# Patient Record
Sex: FEMALE | Race: ASIAN | NOT HISPANIC OR LATINO | Employment: FULL TIME | URBAN - METROPOLITAN AREA
[De-identification: names, ages, dates, MRNs, and addresses within clinical notes are randomized per-mention and may not be internally consistent; named-entity substitution may affect disease eponyms.]

---

## 2017-10-18 ENCOUNTER — OFFICE VISIT (OUTPATIENT)
Dept: INTERNAL MEDICINE | Facility: CLINIC | Age: 27
End: 2017-10-18
Payer: COMMERCIAL

## 2017-10-18 VITALS
TEMPERATURE: 98.2 F | HEART RATE: 76 BPM | DIASTOLIC BLOOD PRESSURE: 80 MMHG | WEIGHT: 132.3 LBS | OXYGEN SATURATION: 100 % | SYSTOLIC BLOOD PRESSURE: 110 MMHG | HEIGHT: 61 IN | BODY MASS INDEX: 24.98 KG/M2

## 2017-10-18 DIAGNOSIS — Z00.00 ROUTINE HISTORY AND PHYSICAL EXAMINATION OF ADULT: Primary | ICD-10-CM

## 2017-10-18 DIAGNOSIS — D64.9 ANEMIA, UNSPECIFIED TYPE: Primary | ICD-10-CM

## 2017-10-18 DIAGNOSIS — Z23 NEED FOR PROPHYLACTIC VACCINATION AND INOCULATION AGAINST INFLUENZA: ICD-10-CM

## 2017-10-18 DIAGNOSIS — D64.9 ANEMIA, UNSPECIFIED TYPE: ICD-10-CM

## 2017-10-18 DIAGNOSIS — N92.1 PROLONGED MENSTRUATION: ICD-10-CM

## 2017-10-18 DIAGNOSIS — Z13.220 SCREENING FOR CHOLESTEROL LEVEL: ICD-10-CM

## 2017-10-18 DIAGNOSIS — N92.1 PROLONGED MENSTRUAL CYCLE: ICD-10-CM

## 2017-10-18 LAB
ALBUMIN SERPL-MCNC: 3.7 G/DL (ref 3.4–5)
ALP SERPL-CCNC: 53 U/L (ref 40–150)
ALT SERPL W P-5'-P-CCNC: 18 U/L (ref 0–50)
ANION GAP SERPL CALCULATED.3IONS-SCNC: 6 MMOL/L (ref 3–14)
AST SERPL W P-5'-P-CCNC: 18 U/L (ref 0–45)
BILIRUB SERPL-MCNC: 0.8 MG/DL (ref 0.2–1.3)
BUN SERPL-MCNC: 11 MG/DL (ref 7–30)
CALCIUM SERPL-MCNC: 8.8 MG/DL (ref 8.5–10.1)
CHLORIDE SERPL-SCNC: 106 MMOL/L (ref 94–109)
CO2 SERPL-SCNC: 25 MMOL/L (ref 20–32)
CREAT SERPL-MCNC: 0.73 MG/DL (ref 0.52–1.04)
ERYTHROCYTE [DISTWIDTH] IN BLOOD BY AUTOMATED COUNT: 15.2 % (ref 10–15)
FSH SERPL-ACNC: 3.6 IU/L
GFR SERPL CREATININE-BSD FRML MDRD: >90 ML/MIN/1.7M2
GLUCOSE SERPL-MCNC: 87 MG/DL (ref 70–99)
HCG SERPL QL: NEGATIVE
HCT VFR BLD AUTO: 34.4 % (ref 35–47)
HGB BLD-MCNC: 11.4 G/DL (ref 11.7–15.7)
INR PPP: 0.95 (ref 0.86–1.14)
LH SERPL-ACNC: 11.1 IU/L
MCH RBC QN AUTO: 24.8 PG (ref 26.5–33)
MCHC RBC AUTO-ENTMCNC: 33.1 G/DL (ref 31.5–36.5)
MCV RBC AUTO: 75 FL (ref 78–100)
PLATELET # BLD AUTO: 237 10E9/L (ref 150–450)
POTASSIUM SERPL-SCNC: 4.5 MMOL/L (ref 3.4–5.3)
PROT SERPL-MCNC: 8 G/DL (ref 6.8–8.8)
RBC # BLD AUTO: 4.6 10E12/L (ref 3.8–5.2)
SODIUM SERPL-SCNC: 137 MMOL/L (ref 133–144)
TSH SERPL DL<=0.005 MIU/L-ACNC: 3.73 MU/L (ref 0.4–4)
WBC # BLD AUTO: 6.1 10E9/L (ref 4–11)

## 2017-10-18 PROCEDURE — 99385 PREV VISIT NEW AGE 18-39: CPT | Mod: 25 | Performed by: INTERNAL MEDICINE

## 2017-10-18 PROCEDURE — 90686 IIV4 VACC NO PRSV 0.5 ML IM: CPT | Performed by: INTERNAL MEDICINE

## 2017-10-18 PROCEDURE — 80061 LIPID PANEL: CPT | Performed by: INTERNAL MEDICINE

## 2017-10-18 PROCEDURE — 84443 ASSAY THYROID STIM HORMONE: CPT | Performed by: INTERNAL MEDICINE

## 2017-10-18 PROCEDURE — 99212 OFFICE O/P EST SF 10 MIN: CPT | Mod: 25 | Performed by: INTERNAL MEDICINE

## 2017-10-18 PROCEDURE — 80053 COMPREHEN METABOLIC PANEL: CPT | Performed by: INTERNAL MEDICINE

## 2017-10-18 PROCEDURE — 36415 COLL VENOUS BLD VENIPUNCTURE: CPT | Performed by: INTERNAL MEDICINE

## 2017-10-18 PROCEDURE — 90471 IMMUNIZATION ADMIN: CPT | Performed by: INTERNAL MEDICINE

## 2017-10-18 PROCEDURE — 83002 ASSAY OF GONADOTROPIN (LH): CPT | Performed by: INTERNAL MEDICINE

## 2017-10-18 PROCEDURE — 85610 PROTHROMBIN TIME: CPT | Performed by: INTERNAL MEDICINE

## 2017-10-18 PROCEDURE — 83540 ASSAY OF IRON: CPT | Performed by: INTERNAL MEDICINE

## 2017-10-18 PROCEDURE — 82728 ASSAY OF FERRITIN: CPT | Performed by: INTERNAL MEDICINE

## 2017-10-18 PROCEDURE — 84703 CHORIONIC GONADOTROPIN ASSAY: CPT | Performed by: INTERNAL MEDICINE

## 2017-10-18 PROCEDURE — 83001 ASSAY OF GONADOTROPIN (FSH): CPT | Performed by: INTERNAL MEDICINE

## 2017-10-18 PROCEDURE — 83550 IRON BINDING TEST: CPT | Performed by: INTERNAL MEDICINE

## 2017-10-18 PROCEDURE — 85027 COMPLETE CBC AUTOMATED: CPT | Performed by: INTERNAL MEDICINE

## 2017-10-18 NOTE — PROGRESS NOTES
Injectable Influenza Immunization Documentation    1.  Is the person to be vaccinated sick today?   No    2. Does the person to be vaccinated have an allergy to a component   of the vaccine?   No    3. Has the person to be vaccinated ever had a serious reaction   to influenza vaccine in the past?   No    4. Has the person to be vaccinated ever had Guillain-Barré syndrome?   No    Form completed by Mariana WILLETT

## 2017-10-18 NOTE — PROGRESS NOTES
SUBJECTIVE:                                                      HPI: Tere Alves is a pleasant 27 year old female who presents for a physical.    She complains of prolonged menstrual cycle (every 3 months) and prolonged menses (lasting 3-4 weeks):  - this has been ongoing for several years, at least  - periods are generally heavy throughout 3-4 weeks  - no prior work-ups  - was on OCPs last year for ~1 year - did not change above  - currently trying to get pregnant - they have been trying now for 3-4 months (were using condoms before)  - LMP ~3.5 weeks ago    No bleeding diatheses otherwise (nose bleeds, gum bleeding, rectal bleeding, hemarthroses, etc.)    ROS:  Constitutional: denies unintentional weight loss or gain; denies fevers, chills, or sweats     Cardiovascular: denies chest pain, palpitations, or edema  Respiratory: denies cough, wheezing, shortness of breath, or dyspnea on exertion  Gastrointestinal: denies nausea, vomiting, constipation, diarrhea, or abdominal pain  Genitourinary: denies urinary frequency, urgency, dysuria, or hematuria  Integumentary: denies rash or pruritus  Musculoskeletal: denies back pain, muscle pain, joint pain, or joint swelling  Neurologic: denies focal weakness, numbness, or tingling  Hematologic/Immunologic: denies history of anemia or blood transfusions  Endocrine: denies heat or cold intolerance; denies polyuria, polydipsia  Psychiatric: denies anxiety; see preventative health below    Past Medical History:   Diagnosis Date     NO ACTIVE PROBLEMS      Past Surgical History:   Procedure Laterality Date     NO HISTORY OF SURGERY       Family History   Problem Relation Age of Onset     DIABETES No family hx of      Myocardial Infarction No family hx of      CEREBROVASCULAR DISEASE No family hx of      Coronary Artery Disease Early Onset No family hx of      Breast Cancer No family hx of      Ovarian Cancer No family hx of      Colon Cancer No family hx of   "    Occupational History           Social History Main Topics     Smoking status: Never Smoker     Smokeless tobacco: Never Used     Alcohol use Yes      Comment: couple drinks/month     Drug use: No     Sexual activity: Yes     Partners: Male     Birth control/ protection: None     Social History Narrative    .    No kids currently, but trying to get pregnant (x3-4 months as of October, 2017).     No exercise.      No Known Allergies     MEDS: None    OBJECTIVE:                                                      /80 (BP Location: Left arm, Patient Position: Chair, Cuff Size: Adult Regular)  Pulse 76  Temp 98.2  F (36.8  C) (Oral)  Ht 5' 1\" (1.549 m)  Wt 132 lb 4.8 oz (60 kg)  LMP 09/18/2017 (Approximate)  SpO2 100%  Breastfeeding? No  BMI 25 kg/m2  Constitutional: well-appearing  Eyes: normal conjunctivae and lids; pupils equal, round, and reactive to light  Ears, Nose, Mouth, and Throat: normal ears and nose; tympanic membranes visualized and normal; normal lips, teeth, and gums; no oropharyngeal lesions or ulcers  Neck: supple and symmetric; no lymphadenopathy; no thyromegaly or masses  Respiratory: normal respiratory effort; clear to auscultation bilaterally  Cardiovascular: regular rate and rhythm; pedal pulses palpable; no edema  Gastrointestinal: soft, non-tender, non-distended, and bowel sounds present; no organomegaly or masses  Musculoskeletal: normal gait and station  Psych: normal judgment and insight; normal mood and affect; recent and remote memory intact; oriented to time, place, and person    PREVENTATIVE HEALTH                                                      BMI: within normal limits   Blood pressure: within normal limits   Breast CA screening: not medically indicated at this time   Cervical CA screening: last pap performed and normal in 2016, per patient ([place], [place])  Colon CA screening: not medically indicated at this time "   Lung CA screening: n/a   Dexa: not medically indicated at this time   Screening HCV: n/a   Screening cholesterol: not medically indicated at this time   Screening diabetes: not medically indicated at this time   STD testing: no risk factors present/discussed with and declined by patient  Depression screening: PHQ-2 assessment completed and reviewed - no intervention indicated at this time  Alcohol misuse screening: alcohol use reviewed - no intervention indicated at this time  Immunizations: reviewed; flu shot DUE; TDAP DUE, but patient will likely be pregnant in near future (will be getting during 3rd trimester)    ASSESSMENT/PLAN:                                                       (Z00.00) Routine history and physical examination of adult  (primary encounter diagnosis)  Comment: PMH, PSH, FH, SH, medications, allergies, immunizations, and preventative health measures reviewed.   Plan: see below for plans.     (Z23) Need for prophylactic vaccination and inoculation against influenza  Plan: flu shot given today.     (N92.1) Prolonged menstrual cycle  (N92.1) Prolonged menstruation  Comment: not influenced by OCPs.   Plan:    - CBC, CMP, INR, and TSH reflex today.   - LH, FSH, and serum HCG today.   - if labs unrevealing, will refer to ob/gyn for further evaluation.     The instructions on the AVS were discussed and explained to the patient. Patient expressed understanding of instructions.    (Chart documentation was completed, in part, with playnik voice-recognition software. Even though reviewed, some grammatical, spelling, and word errors may remain.)    Evy Murphy MD   [address] [address]  T: [phone], F: [phone]

## 2017-10-18 NOTE — MR AVS SNAPSHOT
After Visit Summary   10/18/2017    Tere Alves    MRN: [id number]           Patient Information     Date Of Birth          1990        Visit Information        Provider Department      10/18/2017 11:00 AM Evy Murphy MD [facility]        Today's Diagnoses     Need for prophylactic vaccination and inoculation against influenza    -  1    Prolonged menstrual cycle        Prolonged menstruation          Care Instructions    Flu shot today.    ---    Labs - please proceed to our first floor laboratory to have these drawn (show them your orange ticket).     Results:    If normal: we will release results in [employer] or send them in the mail. You will not be called for these results.    If abnormal, but non-urgent: we will release results in MyChart or send them in the mail. You will not be called for these results.    If abnormal and urgent: we will call you.    ---    If labs are unrevealing, recommend seeing ob/gyn for further evaluation.               Follow-ups after your visit        Who to contact     If you have questions or need follow up information about today's clinic visit or your schedule please contact [facility] directly at [phone].  Normal or non-critical lab and imaging results will be communicated to you by [employer], letter or phone within 4 business days after the clinic has received the results. If you do not hear from us within 7 days, please contact the clinic through [employer] or phone. If you have a critical or abnormal lab result, we will notify you by phone as soon as possible.  Submit refill requests through [employer] or call your pharmacy and they will forward the refill request to us. Please allow 3 business days for your refill to be completed.          Additional Information About Your Visit        [employer] Information     ASSIA lets you send messages to your doctor, view your test results, renew your  "prescriptions, schedule appointments and more. To sign up, go to [email or web address] . Click on \"Log in\" on the left side of the screen, which will take you to the Welcome page. Then click on \"Sign up Now\" on the right side of the page.     You will be asked to enter the access code listed below, as well as some personal information. Please follow the directions to create your username and password.     Your access code is: [email or web address]  Expires: 2018 11:32 AM     Your access code will  in 90 days. If you need help or a new code, please call your [facility] clinic or [phone].        Care EveryWhere ID     This is your Care EveryWhere ID. This could be used by other organizations to access your [facility] medical records  [id number]        Your Vitals Were     Pulse Temperature Height Last Period Pulse Oximetry Breastfeeding?    76 98.2  F (36.8  C) (Oral) 5' 1\" (1.549 m) 2017 (Approximate) 100% No    BMI (Body Mass Index)                   25 kg/m2            Blood Pressure from Last 3 Encounters:   10/18/17 110/80    Weight from Last 3 Encounters:   10/18/17 132 lb 4.8 oz (60 kg)              We Performed the Following     CBC with platelets     Comprehensive metabolic panel     FLU VAC, SPLIT VIRUS IM > 3 YO (QUADRIVALENT) [[id number]]     Follicle stimulating hormone     HCG qualitative Blood     INR     Lutropin     TSH with free T4 reflex     Vaccine Administration, Initial [[id number]]        Primary Care Provider    None Specified       No primary provider on file.        Equal Access to Services     [facility]: Hadii aad ku hadasho Sosanjeevali, waaxda luqadaha, qaybta kaalmada adeegyada, alea marr . So [facility] [phone].    ATENCIÓN: Si habla español, tiene a pickett disposición servicios gratuitos de asistencia lingüística. Llame al [phone].    We comply with applicable federal civil rights laws and [place] laws. We do not discriminate on the basis of race, " color, national origin, age, disability, sex, sexual orientation, or gender identity.            Thank you!     Thank you for choosing [facility]  for your care. Our goal is always to provide you with excellent care. Hearing back from our patients is one way we can continue to improve our services. Please take a few minutes to complete the written survey that you may receive in the mail after your visit with us. Thank you!             Your Updated Medication List - Protect others around you: Learn how to safely use, store and throw away your medicines at www.disposemymeds.org.      Notice  As of 10/18/2017 11:32 AM    You have not been prescribed any medications.

## 2017-10-18 NOTE — NURSING NOTE
"Chief Complaint   Patient presents with     Physical     Menstrual cycles     Questions on Menses . Having periods every -3 monrhs which lasts for a month.       Initial /80 (BP Location: Left arm, Patient Position: Chair, Cuff Size: Adult Regular)  Pulse 76  Temp 98.2  F (36.8  C) (Oral)  Ht 5' 1\" (1.549 m)  Wt 132 lb 4.8 oz (60 kg)  LMP 09/18/2017 (Approximate)  SpO2 100%  Breastfeeding? No  BMI 25 kg/m2 Estimated body mass index is 25 kg/(m^2) as calculated from the following:    Height as of this encounter: 5' 1\" (1.549 m).    Weight as of this encounter: 132 lb 4.8 oz (60 kg).  Medication Reconciliation: complete     Kaminibose MA      "

## 2017-10-18 NOTE — PATIENT INSTRUCTIONS
Flu shot today.    ---    Labs - please proceed to our first floor laboratory to have these drawn (show them your orange ticket).     Results:    If normal: we will release results in MyChart or send them in the mail. You will not be called for these results.    If abnormal, but non-urgent: we will release results in MyChart or send them in the mail. You will not be called for these results.    If abnormal and urgent: we will call you.    ---    If labs are unrevealing, recommend seeing ob/gyn for further evaluation.

## 2017-10-19 DIAGNOSIS — Z13.220 SCREENING FOR CHOLESTEROL LEVEL: Primary | ICD-10-CM

## 2017-10-19 LAB
CHOLEST SERPL-MCNC: 215 MG/DL
FERRITIN SERPL-MCNC: 9 NG/ML (ref 12–150)
HDLC SERPL-MCNC: 52 MG/DL
IRON SATN MFR SERPL: 19 % (ref 15–46)
IRON SERPL-MCNC: 65 UG/DL (ref 35–180)
LDLC SERPL CALC-MCNC: 136 MG/DL
NONHDLC SERPL-MCNC: 163 MG/DL
TIBC SERPL-MCNC: 348 UG/DL (ref 240–430)
TRIGL SERPL-MCNC: 133 MG/DL

## 2017-10-20 ENCOUNTER — TELEPHONE (OUTPATIENT)
Dept: INTERNAL MEDICINE | Facility: CLINIC | Age: 27
End: 2017-10-20

## 2017-10-20 NOTE — TELEPHONE ENCOUNTER
Called patient. No answer. Left VM for patient call back.    ---    Labs demonstrate iron deficiency anemia.    This is a very common finding in menstruating women and nothing to be concerned about.     Recommend iron supplementation as follows:  - slow release iron tablet daily (available over the counter)  - take iron with either a glass of orange juice or vitamin C supplement   - may also need a stool softener (colace/docusate) twice a day if constipation develops with iron use  - continue consistently for at least 3 months    ---    Also, cholesterol is a bit elevated.    No medication is needed, but lifestyle modifications are recommended: adherence to a heart healthy diet and regular exercise.    ---    Labs otherwise normal.

## 2018-01-28 ENCOUNTER — HEALTH MAINTENANCE LETTER (OUTPATIENT)
Age: 28
End: 2018-01-28

## 2018-05-16 ENCOUNTER — OFFICE VISIT (OUTPATIENT)
Dept: MIDWIFE SERVICES | Facility: CLINIC | Age: 28
End: 2018-05-16
Payer: COMMERCIAL

## 2018-05-16 VITALS
WEIGHT: 135.8 LBS | SYSTOLIC BLOOD PRESSURE: 124 MMHG | BODY MASS INDEX: 24.99 KG/M2 | HEIGHT: 62 IN | DIASTOLIC BLOOD PRESSURE: 80 MMHG

## 2018-05-16 DIAGNOSIS — Z11.8 SCREENING FOR CHLAMYDIAL DISEASE: ICD-10-CM

## 2018-05-16 DIAGNOSIS — Z32.00 ENCOUNTER FOR PREGNANCY TEST, RESULT UNKNOWN: ICD-10-CM

## 2018-05-16 DIAGNOSIS — N93.9 ABNORMAL UTERINE BLEEDING: Primary | ICD-10-CM

## 2018-05-16 DIAGNOSIS — Z11.3 SCREEN FOR STD (SEXUALLY TRANSMITTED DISEASE): ICD-10-CM

## 2018-05-16 LAB
BETA HCG QUAL IFA URINE: NEGATIVE
ERYTHROCYTE [DISTWIDTH] IN BLOOD BY AUTOMATED COUNT: 14.5 % (ref 10–15)
HCT VFR BLD AUTO: 30.3 % (ref 35–47)
HGB BLD-MCNC: 10.3 G/DL (ref 11.7–15.7)
MCH RBC QN AUTO: 25.1 PG (ref 26.5–33)
MCHC RBC AUTO-ENTMCNC: 34 G/DL (ref 31.5–36.5)
MCV RBC AUTO: 74 FL (ref 78–100)
PLATELET # BLD AUTO: 242 10E9/L (ref 150–450)
RBC # BLD AUTO: 4.11 10E12/L (ref 3.8–5.2)
WBC # BLD AUTO: 6.9 10E9/L (ref 4–11)

## 2018-05-16 PROCEDURE — 82728 ASSAY OF FERRITIN: CPT | Performed by: ADVANCED PRACTICE MIDWIFE

## 2018-05-16 PROCEDURE — 84703 CHORIONIC GONADOTROPIN ASSAY: CPT | Performed by: ADVANCED PRACTICE MIDWIFE

## 2018-05-16 PROCEDURE — 36415 COLL VENOUS BLD VENIPUNCTURE: CPT | Performed by: ADVANCED PRACTICE MIDWIFE

## 2018-05-16 PROCEDURE — 99203 OFFICE O/P NEW LOW 30 MIN: CPT | Performed by: ADVANCED PRACTICE MIDWIFE

## 2018-05-16 PROCEDURE — 99000 SPECIMEN HANDLING OFFICE-LAB: CPT | Performed by: ADVANCED PRACTICE MIDWIFE

## 2018-05-16 PROCEDURE — 00000447 ZZHCL STATISTIC VON WILLEBRAND MULTIMERS: Performed by: ADVANCED PRACTICE MIDWIFE

## 2018-05-16 PROCEDURE — 83550 IRON BINDING TEST: CPT | Performed by: ADVANCED PRACTICE MIDWIFE

## 2018-05-16 PROCEDURE — 85246 CLOT FACTOR VIII VW ANTIGEN: CPT | Performed by: ADVANCED PRACTICE MIDWIFE

## 2018-05-16 PROCEDURE — 85045 AUTOMATED RETICULOCYTE COUNT: CPT | Performed by: ADVANCED PRACTICE MIDWIFE

## 2018-05-16 PROCEDURE — 84702 CHORIONIC GONADOTROPIN TEST: CPT | Performed by: ADVANCED PRACTICE MIDWIFE

## 2018-05-16 PROCEDURE — 00000328 ZZHCL STATISTIC PTT NC: Performed by: ADVANCED PRACTICE MIDWIFE

## 2018-05-16 PROCEDURE — 83540 ASSAY OF IRON: CPT | Performed by: ADVANCED PRACTICE MIDWIFE

## 2018-05-16 PROCEDURE — 84443 ASSAY THYROID STIM HORMONE: CPT | Performed by: ADVANCED PRACTICE MIDWIFE

## 2018-05-16 PROCEDURE — 86780 TREPONEMA PALLIDUM: CPT | Performed by: ADVANCED PRACTICE MIDWIFE

## 2018-05-16 PROCEDURE — 00000167 ZZHCL STATISTIC INR NC: Performed by: ADVANCED PRACTICE MIDWIFE

## 2018-05-16 PROCEDURE — 87591 N.GONORRHOEAE DNA AMP PROB: CPT | Performed by: ADVANCED PRACTICE MIDWIFE

## 2018-05-16 PROCEDURE — 00000401 ZZHCL STATISTIC THROMBIN TIME NC: Performed by: ADVANCED PRACTICE MIDWIFE

## 2018-05-16 PROCEDURE — 85027 COMPLETE CBC AUTOMATED: CPT | Performed by: ADVANCED PRACTICE MIDWIFE

## 2018-05-16 PROCEDURE — 86803 HEPATITIS C AB TEST: CPT | Performed by: ADVANCED PRACTICE MIDWIFE

## 2018-05-16 PROCEDURE — 85245 CLOT FACTOR VIII VW RISTOCTN: CPT | Performed by: ADVANCED PRACTICE MIDWIFE

## 2018-05-16 PROCEDURE — 87340 HEPATITIS B SURFACE AG IA: CPT | Performed by: ADVANCED PRACTICE MIDWIFE

## 2018-05-16 PROCEDURE — 85240 CLOT FACTOR VIII AHG 1 STAGE: CPT | Performed by: ADVANCED PRACTICE MIDWIFE

## 2018-05-16 PROCEDURE — 87491 CHLMYD TRACH DNA AMP PROBE: CPT | Performed by: ADVANCED PRACTICE MIDWIFE

## 2018-05-16 PROCEDURE — 85245 CLOT FACTOR VIII VW RISTOCTN: CPT | Mod: 90 | Performed by: ADVANCED PRACTICE MIDWIFE

## 2018-05-16 PROCEDURE — 85247 CLOT FACTOR VIII MULTIMETRIC: CPT | Mod: 90 | Performed by: ADVANCED PRACTICE MIDWIFE

## 2018-05-16 PROCEDURE — 87389 HIV-1 AG W/HIV-1&-2 AB AG IA: CPT | Performed by: ADVANCED PRACTICE MIDWIFE

## 2018-05-16 RX ORDER — FERROUS SULFATE 325(65) MG
325 TABLET ORAL
Qty: 60 TABLET | Refills: 0 | Status: SHIPPED | OUTPATIENT
Start: 2018-05-16 | End: 2019-07-02

## 2018-05-16 NOTE — MR AVS SNAPSHOT
After Visit Summary   5/16/2018    Tere Alves    MRN: [id number]           Patient Information     Date Of Birth          1990        Visit Information        Provider Department      5/16/2018 3:50 PM Marianna Villanueva APRN CNM [facility]        Today's Diagnoses     Encounter for pregnancy test    -  1    Abnormal uterine bleeding        Screen for STD (sexually transmitted disease)        Screening for chlamydial disease           Follow-ups after your visit        Your next 10 appointments already scheduled     May 30, 2018  8:55 AM CDT   US TRANSVAGINAL NON OB with WEUS1   [facility] ([facility])    [address] [id number] [phone]           Please bring a list of your medicines (including vitamins, minerals and over-the-counter drugs). Also, tell your doctor about any allergies you may have. Wear comfortable clothes and leave your valuables at home.  You do not need to do anything special to prepare for your exam.  Please call the Imaging Department at your exam site with any questions.            May 30, 2018  9:30 AM CDT   Office Visit with HEATHER Issa CNM   [facility] ([facility]    [address] [id number] [phone]           Bring a current list of meds and any records pertaining to this visit. For Physicals, please bring immunization records and any forms needing to be filled out. Please arrive 10 minutes early to complete paperwork.              Future tests that were ordered for you today     Open Future Orders        Priority Expected Expires Ordered    US Transvaginal Non OB Routine  5/17/2019 5/16/2018            Who to contact     If you have questions or need follow up information about today's clinic visit or your schedule please contact [facility]  "directly at [phone].  Normal or non-critical lab and imaging results will be communicated to you by [employer], letter or phone within 4 business days after the clinic has received the results. If you do not hear from us within 7 days, please contact the clinic through [employer] or phone. If you have a critical or abnormal lab result, we will notify you by phone as soon as possible.  Submit refill requests through [employer] or call your pharmacy and they will forward the refill request to us. Please allow 3 business days for your refill to be completed.          Additional Information About Your Visit        [employer] Information     [employer] gives you secure access to your electronic health record. If you see a primary care provider, you can also send messages to your care team and make appointments. If you have questions, please call your primary care clinic.  If you do not have a primary care provider, please call [phone] and they will assist you.        Care EveryWhere ID     This is your Care EveryWhere ID. This could be used by other organizations to access your [facility] medical records  [id number]        Your Vitals Were     Height Last Period BMI (Body Mass Index)             5' 2\" (1.575 m) 03/25/2018 (Exact Date) 24.84 kg/m2          Blood Pressure from Last 3 Encounters:   05/16/18 124/80   10/18/17 110/80    Weight from Last 3 Encounters:   05/16/18 135 lb 12.8 oz (61.6 kg)   10/18/17 132 lb 4.8 oz (60 kg)              We Performed the Following     Beta HCG Qual, Urine - [facility] and [place] ([id number])     CBC with platelets     CHLAMYDIA TRACHOMATIS PCR     Ferritin     HCG Quantitative Pregnancy, Blood ([id number])     Hepatitis B Surface  Antigen     Hepatitis C Antibody     HIV Antigen Antibody Combo     Iron and iron binding capacity     NEISSERIA GONORRHOEA PCR     Reticulocyte count     Treponema Abs w Reflex to RPR and Titer     TSH with free T4 reflex     Von Willebrand antigen     von Willebrand " Factor Activity     Von Willebrand Multimers     VWF Activity with reflex to Ristocetin Cofactor Activity          Today's Medication Changes          These changes are accurate as of 5/16/18  5:19 PM.  If you have any questions, ask your nurse or doctor.               Start taking these medicines.        Dose/Directions    ferrous sulfate 325 (65 Fe) MG tablet   Commonly known as:  IRON   Used for:  Abnormal uterine bleeding   Started by:  Marianna Villanueva APRN CNM        Dose:  325 mg   Take 1 tablet (325 mg) by mouth daily (with breakfast)   Quantity:  60 tablet   Refills:  0       norgestrel-ethinyl estradiol 0.3-30 MG-MCG per tablet   Commonly known as:  LO/OVRAL   Used for:  Abnormal uterine bleeding   Started by:  Marianna Villanueva APRN CNM        Take three pills per day for two days, followed by two pills per day for three days, followed by one pill per day until pack is gone.   Quantity:  28 tablet   Refills:  0            Where to get your medicines      These medications were sent to [facility] [address] [place] [address]     Phone:  [phone]     ferrous sulfate 325 (65 Fe) MG tablet    norgestrel-ethinyl estradiol 0.3-30 MG-MCG per tablet                Primary Care Provider Office Phone # Fax #    Evy Murphy [phone]       [address] [facility] [address]        Equal Access to Services     [facility] : Hadtimur gormano Soanatoly, waaxda luqadaha, qaybta kaalmada deann, alea marr . So [facility] [phone].    ATENCIÓN: Si habla español, tiene a pcikett disposición servicios gratuitos de asistencia lingüística. Cy al [phone].    We comply with applicable federal civil rights laws and [place] laws. We do not discriminate on the basis of race, color, national origin, age, disability, sex, sexual orientation, or gender identity.            Thank you!      Thank you for choosing [facility]  for your care. Our goal is always to provide you with excellent care. Hearing back from our patients is one way we can continue to improve our services. Please take a few minutes to complete the written survey that you may receive in the mail after your visit with us. Thank you!             Your Updated Medication List - Protect others around you: Learn how to safely use, store and throw away your medicines at www.disposemymeds.org.          This list is accurate as of 5/16/18  5:19 PM.  Always use your most recent med list.                   Brand Name Dispense Instructions for use Diagnosis    ferrous sulfate 325 (65 Fe) MG tablet    IRON    60 tablet    Take 1 tablet (325 mg) by mouth daily (with breakfast)    Abnormal uterine bleeding       IBUPROFEN PO           norgestrel-ethinyl estradiol 0.3-30 MG-MCG per tablet    LO/OVRAL    28 tablet    Take three pills per day for two days, followed by two pills per day for three days, followed by one pill per day until pack is gone.    Abnormal uterine bleeding

## 2018-05-16 NOTE — PROGRESS NOTES
"Midwife Dysfunctional Uterine Bleeding Note    Date: 2018    CC:  Abnormal Uterine Bleeding    HPI:  Tere Alves is a 28 year old female  presents for evaluation of abnormal uterine bleeding as a referral from Evy Murphy.      Tere states that, for the past five years, she has had irregular, long and heavy periods.  Prior to that, periods were not irregular or heavy.  She states that nothing changed (weight gain/loss, etc) when her periods became abnormal five years ago. Periods occur approximately every 45 days and last 20-25 days.  Bleeding is heavy with clots; Tere states she typically has to change her saturated pad every 4-5 hours.      LMP was 3/25/18 and has not stopped. Two weeks ago, bleeding became heavier. Tere reports having to change her saturated maxi pad every 1-1.5 hours, even at night. She has also been passing clots.    Tere was seen in 2017 for abnormal uterine bleeding and workup at that time was normal. She has never had a pelvic ultrasound. Tere states that abnormal bleeding has been the same since she was last seen, aside from current heavy bleeding episode. She notes that she tried to conceive for approximately six months but has now stopped trying to conceive and uses condoms for contraception. Her  is back home in [facility] for the past two months, so Tere has not had intercourse since 2018.    Tere states that dizziness, lower back pain, cramps, headache, fatigue, vomiting and \"pinching pain\" bilaterally in her pelvis are associated symptoms.  Denies fever/chills.      - Last pap: 2016. Tere denies a history of abnormal pap or any cervical procedures.      GYN HISTORY:  Patient's last menstrual period was 2018 (exact date).   Menarche: 11  STI history: No STD history  History of cervical procedures: None   Contraceptive History: Tere states that she tried a birth control pill in 2016 for 3 months to help with abnormal periods. Tere " "states that her periods did not change and she began having cramps so she discontinued the OCP  The patient is sexually active with male partners. Uses condoms, last intercourse 3/2018.      Patient has following risk factors for endometrial cancer:  - Unopposed estrogen exposure: No  - Obesity: No  - Smoking: No  - Nulliparity: Yes.   - Infertility: No. Tried for 6 months and then stopped.  - Early age of menarche / late age of menopause: No  - Family history of colon cancer, ovarian cancer, and type I endometrial cancer: No    Denies the following contraindications to estrogen/progesterone combined contraception:  Migraine with aura  Smoking over age 35  Liver disease  Personal history of blood clot or stroke   History of heart disease  History of breast cancer  Undiagnosed vaginal bleeding  Hypertension  Pregnancy    OBSTETRIC HISTORY:   Obstetric History       T0      L0     SAB0   TAB0   Ectopic0   Multiple0   Live Births0           Past Medical History:   Diagnosis Date     NO ACTIVE PROBLEMS        Past Surgical History:   Procedure Laterality Date     NO HISTORY OF SURGERY           Family History   Problem Relation Age of Onset     DIABETES No family hx of      Myocardial Infarction No family hx of      CEREBROVASCULAR DISEASE No family hx of      Coronary Artery Disease Early Onset No family hx of      Breast Cancer No family hx of      Ovarian Cancer No family hx of      Colon Cancer No family hx of        Current Outpatient Prescriptions   Medication Sig Dispense Refill     IBUPROFEN PO          Allergies: Review of patient's allergies indicates no known allergies.      ROS:   12 point review of systems negative other than symptoms noted below.  Constitutional: Fatigue and Weight Gain  Gastrointestinal: Abdominal Pain  Genitourinary: Cramps, Heavy Bleeding with Period, Irregular Menses and Pelvic Pain  Neurologic: Dizziness    EXAM:  Blood pressure 124/80, height 5' 2\" (1.575 m), weight 135 " lb 12.8 oz (61.6 kg), last menstrual period 2018, not currently breastfeeding.   BMI= Body mass index is 24.84 kg/(m^2).  General - pleasant female in no acute distress.  Pelvic - external genitalia: normal adult female without lesions or abnormalities   Vagina: well rugated, no discharge, no lesions. Moderate/Large amount of bright red blood noted in vaginal vault.   Cervix: bleeding present from cervix. No visible lesions (difficult to visualize due to bleeding). Negative CMT.  Uterus: Normal size, mobile and nontender  Adnexae: no masses or tenderness.  Rectovaginal - deferred.    ASSESSMENT:  Tere Alves is a 28 year old  who presents with abnormal uterine bleeding.     Results for orders placed or performed in visit on 18   Beta HCG Qual, Urine - [facility] and [place] ([id number])   Result Value Ref Range    Beta HCG Qual IFA Urine Negative NEG^Negative          PLAN:  -UPT negative  -STD panel collected today per pt request  -CBC, TSH, Serum HCG, Von Willebrand panel, and iron studies drawn today  -Pelvic ultrasound ordered  -Lo Ovral sent to pharmacy. 3 pills per day x2 days, 2 pills per day x3 days, and one pill per day until pack is finished.  Gave written instructions.  -Rx for Iron sent to pharmacy  -Discussed heavy bleeding precautions and seeking care in the ED if bleeding/clots worsen or if Tere feels faint, SOB, etc.      F/U:  Return to clinic in the next week for pelvic US and follow-up with MD Marianna Villanueva, DNP, APRN, CNM    50 minutes were spent face to face with the patient today discussing her history, diagnosis, and follow-up plan as noted above. Over 50% of the visit was spent in counseling and coordination of care.    Total Visit Time: 50 minutes.

## 2018-05-17 ENCOUNTER — TELEPHONE (OUTPATIENT)
Dept: MIDWIFE SERVICES | Facility: CLINIC | Age: 28
End: 2018-05-17

## 2018-05-17 LAB
B-HCG SERPL-ACNC: <1 IU/L (ref 0–5)
FERRITIN SERPL-MCNC: 13 NG/ML (ref 12–150)
HBV SURFACE AG SERPL QL IA: NONREACTIVE
HCV AB SERPL QL IA: NONREACTIVE
HIV 1+2 AB+HIV1 P24 AG SERPL QL IA: NONREACTIVE
IRON SATN MFR SERPL: 8 % (ref 15–46)
IRON SERPL-MCNC: 24 UG/DL (ref 35–180)
RETICS # AUTO: 40.8 10E9/L (ref 25–95)
RETICS/RBC NFR AUTO: 1 % (ref 0.5–2)
TIBC SERPL-MCNC: 286 UG/DL (ref 240–430)
TSH SERPL DL<=0.005 MIU/L-ACNC: 2.2 MU/L (ref 0.4–4)

## 2018-05-18 LAB
C TRACH DNA SPEC QL NAA+PROBE: NEGATIVE
N GONORRHOEA DNA SPEC QL NAA+PROBE: NEGATIVE
SPECIMEN SOURCE: NORMAL
SPECIMEN SOURCE: NORMAL
T PALLIDUM AB SER QL: NONREACTIVE

## 2018-05-21 LAB
FACT VIII ACT/NOR PPP: 146 % (ref 55–200)
VWF CBA/VWF AG PPP IA-RTO: 208 % (ref 50–200)
VWF:AC ACT/NOR PPP IA: 201 % (ref 50–180)

## 2018-05-22 LAB — VWF MULTIMERS PPP QL: NORMAL

## 2018-05-24 ENCOUNTER — OFFICE VISIT (OUTPATIENT)
Dept: OBGYN | Facility: CLINIC | Age: 28
End: 2018-05-24
Attending: ADVANCED PRACTICE MIDWIFE
Payer: COMMERCIAL

## 2018-05-24 ENCOUNTER — RADIANT APPOINTMENT (OUTPATIENT)
Dept: ULTRASOUND IMAGING | Facility: CLINIC | Age: 28
End: 2018-05-24
Attending: ADVANCED PRACTICE MIDWIFE
Payer: COMMERCIAL

## 2018-05-24 ENCOUNTER — TELEPHONE (OUTPATIENT)
Dept: OBGYN | Facility: CLINIC | Age: 28
End: 2018-05-24

## 2018-05-24 VITALS
SYSTOLIC BLOOD PRESSURE: 102 MMHG | DIASTOLIC BLOOD PRESSURE: 62 MMHG | HEIGHT: 62 IN | BODY MASS INDEX: 24.44 KG/M2 | HEART RATE: 76 BPM | WEIGHT: 132.8 LBS

## 2018-05-24 DIAGNOSIS — N93.9 ABNORMAL UTERINE BLEEDING: ICD-10-CM

## 2018-05-24 LAB — VWF:RCO ACT/NOR PPP PL AGG: 162 % (ref 51–215)

## 2018-05-24 PROCEDURE — 99214 OFFICE O/P EST MOD 30 MIN: CPT | Performed by: OBSTETRICS & GYNECOLOGY

## 2018-05-24 PROCEDURE — 76830 TRANSVAGINAL US NON-OB: CPT | Performed by: OBSTETRICS & GYNECOLOGY

## 2018-05-24 NOTE — TELEPHONE ENCOUNTER
Order Questions      Question Answer Comment     Procedure name(s) - multi select D & C, hysteroscopy, myosure      Reason for procedure DUB and possible uterine polyp      Surgeon: Cremer      Is this a multi surgeon case? No      Laterality N/A      Request for additional equipment Other (see comments) None     Anesthesia General      Initiate Pre-op orders for above procedure: Yes, as ordered in Epic Additional orders noted there also     Location of Case: [facility] OR      Operating room  requested: No      Urgency of Surgery: Routine aug     Surgeon Procedure Time (incision to closure) in minutes (per procedure as applicable) 30      Note:  Surgical Case Time Needed (in minutes)     Patient Class (for admit prior to surgery, specify number of days in comments): Same day (hospital outpatient)      Why can t this outpatient surgery be done at the [facility] or  ASC? na      Vendor Needed? Yes

## 2018-05-24 NOTE — MR AVS SNAPSHOT
"              After Visit Summary   5/24/2018    Tere Alves    MRN: [id number]           Patient Information     Date Of Birth          1990        Visit Information        Provider Department      5/24/2018 10:20 AM Elham Salvador MD [facility]        Today's Diagnoses     Abnormal uterine bleeding           Follow-ups after your visit        Who to contact     If you have questions or need follow up information about today's clinic visit or your schedule please contact [facility] directly at [phone].  Normal or non-critical lab and imaging results will be communicated to you by MePleasehart, letter or phone within 4 business days after the clinic has received the results. If you do not hear from us within 7 days, please contact the clinic through Fetchnotest or phone. If you have a critical or abnormal lab result, we will notify you by phone as soon as possible.  Submit refill requests through [employer] or call your pharmacy and they will forward the refill request to us. Please allow 3 business days for your refill to be completed.          Additional Information About Your Visit        MyChart Information     [employer] gives you secure access to your electronic health record. If you see a primary care provider, you can also send messages to your care team and make appointments. If you have questions, please call your primary care clinic.  If you do not have a primary care provider, please call [phone] and they will assist you.        Care EveryWhere ID     This is your Care EveryWhere ID. This could be used by other organizations to access your [place] medical records  [id number]        Your Vitals Were     Pulse Height BMI (Body Mass Index)             76 5' 2\" (1.575 m) 24.29 kg/m2          Blood Pressure from Last 3 Encounters:   05/24/18 102/62   05/16/18 124/80   10/18/17 110/80    Weight from Last 3 Encounters:   05/24/18 132 lb 12.8 oz (60.2 kg) "   05/16/18 135 lb 12.8 oz (61.6 kg)   10/18/17 132 lb 4.8 oz (60 kg)              We Performed the Following     Negrita-Operative Worksheet GYN          Today's Medication Changes          These changes are accurate as of 5/24/18 10:27 AM.  If you have any questions, ask your nurse or doctor.               These medicines have changed or have updated prescriptions.        Dose/Directions    norgestrel-ethinyl estradiol 0.3-30 MG-MCG per tablet   Commonly known as:  LO/OVRAL   This may have changed:    - how much to take  - how to take this  - when to take this  - additional instructions   Used for:  Abnormal uterine bleeding   Changed by:  Elham Salvador MD        Dose:  1 tablet   Take 1 tablet by mouth daily   Quantity:  84 tablet   Refills:  3            Where to get your medicines      These medications were sent to [facility] [address] [place] [address]     Phone:  [phone]     norgestrel-ethinyl estradiol 0.3-30 MG-MCG per tablet                Primary Care Provider Office Phone # Fax #    Evy Murphy [phone]       [address] [facility] [address]        Equal Access to Services     ALEXA SCHULTZ AH: Hadii dilshad burgos hadasho Soomaali, waaxda luqadaha, qaybta kaalmada adeegyada, alea mendes. So [facility] [phone].    ATENCIÓN: Si habla español, tiene a pickett disposición servicios gratuitos de asistencia lingüística. Llame al [phone].    We comply with applicable federal civil rights laws and [place] laws. We do not discriminate on the basis of race, color, national origin, age, disability, sex, sexual orientation, or gender identity.            Thank you!     Thank you for choosing [facility]  for your care. Our goal is always to provide you with excellent care. Hearing back from our patients is one way we can continue to improve our services. Please take a  few minutes to complete the written survey that you may receive in the mail after your visit with us. Thank you!             Your Updated Medication List - Protect others around you: Learn how to safely use, store and throw away your medicines at www.disposemymeds.org.          This list is accurate as of 5/24/18 10:27 AM.  Always use your most recent med list.                   Brand Name Dispense Instructions for use Diagnosis    ferrous sulfate 325 (65 Fe) MG tablet    IRON    60 tablet    Take 1 tablet (325 mg) by mouth daily (with breakfast)    Abnormal uterine bleeding       norgestrel-ethinyl estradiol 0.3-30 MG-MCG per tablet    LO/OVRAL    84 tablet    Take 1 tablet by mouth daily    Abnormal uterine bleeding

## 2018-05-24 NOTE — PROGRESS NOTES
SUBJECTIVE:                                                   Tere Alves is a 28 year old female who presents to clinic today for the following health issue(s):  Patient presents with:  Ultrasound: follow up to Abnormal uterine bleeding      HPI:  Patient has had menorrhagia for 6 months  Most recently several wks of bleeding so was started on ocp and now bleeding quit  Had us today, showed 9mm echogenic area in cavity, poss fibroid or polyp   [facility]  Not desiring conception soon      No LMP recorded. Patient is not currently having periods (Reason: Irregular Periods)..   Patient is sexually active, .  Using oral contraceptives for contraception.    reports that she has never smoked. She has never used smokeless tobacco.    STD testing offered?  Declined    Health maintenance updated:  yes    Problem list and histories reviewed & adjusted, as indicated.  Additional history: as documented.    Patient Active Problem List   Diagnosis     Abnormal uterine bleeding     Past Surgical History:   Procedure Laterality Date     NO HISTORY OF SURGERY        Social History   Substance Use Topics     Smoking status: Never Smoker     Smokeless tobacco: Never Used     Alcohol use Yes      Comment: couple drinks/month         Negative family history of: DIABETES, Myocardial Infarction, CEREBROVASCULAR DISEASE, Coronary Artery Disease Early Onset, Breast Cancer, Ovarian Cancer, Colon Cancer            Current Outpatient Prescriptions   Medication Sig     ferrous sulfate (IRON) 325 (65 Fe) MG tablet Take 1 tablet (325 mg) by mouth daily (with breakfast)     norgestrel-ethinyl estradiol (LO/OVRAL) 0.3-30 MG-MCG per tablet Take 1 tablet by mouth daily     [DISCONTINUED] norgestrel-ethinyl estradiol (LO/OVRAL) 0.3-30 MG-MCG per tablet Take three pills per day for two days, followed by two pills per day for three days, followed by one pill per day until pack is gone.     No current  "facility-administered medications for this visit.      No Known Allergies    ROS:  12 point review of systems negative other than symptoms noted below.    OBJECTIVE:     /62  Pulse 76  Ht 5' 2\" (1.575 m)  Wt 132 lb 12.8 oz (60.2 kg)  BMI 24.29 kg/m2  Body mass index is 24.29 kg/(m^2).    Exam:  Constitutional:  Appearance: Well nourished, well developed alert, in no acute distress  Chest:  Respiratory Effort:  Breathing unlabored  Cardiovascular: Heart: Auscultation:  Regular rate, normal rhythm, no murmurs present  Neurologic/Psychiatric:  Mental Status:  Oriented X3      In-Clinic Test Results:  Results for orders placed or performed in visit on 05/24/18 (from the past 24 hour(s))   US Transvaginal Non OB    Narrative    US Transvaginal Non OB    Order #: [id number] Accession #: [id number]         Study Notes        Rosario Vincent on 5/24/2018  9:47 AM     Gynecological Ultrasound Report  Pelvic U/S - Transvaginal  [facility]  Referring Provider: Dr. Elham Salvador  Sonographer:  Rosario Vincent RDMS  Indication: AUB  LMP: 03/26/18  Gynecological Ultrasonography:   Uterus: anteverted  Size: 8.8 x 5.4 x 4.0cm  Endometrium: Thickness total 9.7mm  Findings: echogenic area = 0.9x 0.6x 0.5cm  Right Ovary: 4.0 x 2.6 x 2.5cm, follicle = 17.5mm  Left Ovary: 2.8 x 2.2 x 2.2cm  Cul de Sac/Pouch of Henri: no free fliud      Impression:  Normal pelvic ultrasound, possible small polyp or fibroid in   cavity  Elham Salvador MD                              ASSESSMENT/PLAN:                                                        ICD-10-CM    1. Abnormal uterine bleeding N93.9 norgestrel-ethinyl estradiol (LO/OVRAL) 0.3-30 MG-MCG per tablet     Negrita-Operative Worksheet GYN       There are no Patient Instructions on file for this visit.    Reviewed her us findings today  Most likely has small polyp or fibroid in cavity  rec D & C, hysteroscopy with myosure to remove it  Refilled her ocp to " continue thru surgery  Had anemia so we need to stop the AUB  Informed consent obtained  Discussed risks and benefits    Elham Salvador MD  [facility]

## 2018-05-25 LAB — VWF MULTIMERS PPP QL: NORMAL

## 2018-05-29 ENCOUNTER — TELEPHONE (OUTPATIENT)
Dept: MIDWIFE SERVICES | Facility: CLINIC | Age: 28
End: 2018-05-29

## 2018-05-29 DIAGNOSIS — R89.9 ABNORMAL LABORATORY TEST RESULT: Primary | ICD-10-CM

## 2018-05-29 NOTE — TELEPHONE ENCOUNTER
Call received from Dr. Kan at the [facility].  Tere's Von Willebrand labs came back slightly elevated; Dr. Kan recommends redrawing the Von Willebrand labs. Labs are not diagnostic at this point.  Discussed with Dr. Salvador who is now managing Tere's case and [facility] redrawing the Von Willbrand labs. Orders placed.    Please call the patient and have her schedule a follow-up lab-only visit.  Thank you-    Marianna Villanueva, DNP, APRN, CNM

## 2018-05-30 NOTE — TELEPHONE ENCOUNTER
Called pt again today to inform her of Dr. Salvador's recommendations to repeat the Von Willbrand labs (orders in place) by having her come in for a lab only visit for the blood draw. Reassured her that we will call her on the results and what the next step would be. Pt verbalized understanding and no further questions.  Lab only visit scheduled 5/31/18 AM  Esthela GARCIA RN

## 2018-05-31 DIAGNOSIS — R89.9 ABNORMAL LABORATORY TEST RESULT: ICD-10-CM

## 2018-05-31 PROCEDURE — 00000328 ZZHCL STATISTIC PTT NC: Performed by: ADVANCED PRACTICE MIDWIFE

## 2018-05-31 PROCEDURE — 00000401 ZZHCL STATISTIC THROMBIN TIME NC: Performed by: ADVANCED PRACTICE MIDWIFE

## 2018-05-31 PROCEDURE — 85245 CLOT FACTOR VIII VW RISTOCTN: CPT | Mod: 90 | Performed by: ADVANCED PRACTICE MIDWIFE

## 2018-05-31 PROCEDURE — 85247 CLOT FACTOR VIII MULTIMETRIC: CPT | Mod: 90 | Performed by: ADVANCED PRACTICE MIDWIFE

## 2018-05-31 PROCEDURE — 36415 COLL VENOUS BLD VENIPUNCTURE: CPT | Performed by: ADVANCED PRACTICE MIDWIFE

## 2018-05-31 PROCEDURE — 00000167 ZZHCL STATISTIC INR NC: Performed by: ADVANCED PRACTICE MIDWIFE

## 2018-05-31 PROCEDURE — 85245 CLOT FACTOR VIII VW RISTOCTN: CPT | Performed by: ADVANCED PRACTICE MIDWIFE

## 2018-05-31 PROCEDURE — 85240 CLOT FACTOR VIII AHG 1 STAGE: CPT | Performed by: ADVANCED PRACTICE MIDWIFE

## 2018-05-31 PROCEDURE — 99000 SPECIMEN HANDLING OFFICE-LAB: CPT | Performed by: ADVANCED PRACTICE MIDWIFE

## 2018-05-31 PROCEDURE — 85246 CLOT FACTOR VIII VW ANTIGEN: CPT | Performed by: ADVANCED PRACTICE MIDWIFE

## 2018-05-31 PROCEDURE — 00000447 ZZHCL STATISTIC VON WILLEBRAND MULTIMERS: Performed by: ADVANCED PRACTICE MIDWIFE

## 2018-06-04 LAB
FACT VIII ACT/NOR PPP: 197 % (ref 55–200)
VWF CBA/VWF AG PPP IA-RTO: 200 % (ref 50–200)
VWF:AC ACT/NOR PPP IA: 177 % (ref 50–180)

## 2018-06-05 LAB — VWF MULTIMERS PPP QL: NORMAL

## 2018-06-08 LAB — VWF:RCO ACT/NOR PPP PL AGG: 158 % (ref 51–215)

## 2018-06-12 LAB — VWF MULTIMERS PPP QL: NORMAL

## 2018-06-13 LAB — VON WILLEBRAND INTERPRETATION: NORMAL

## 2018-06-13 NOTE — PROGRESS NOTES
Please call the patient with the results.  Her repeat studies for Von Willebrands were normal, however this does not exclude diagnosis per Dr. Kan's note.  I would recommend following up with Dr. Salvador for planned D&C for heavy bleeding and may consider consult for hematology at that time if needed.    Thanks--    Marianna Villanueva, TEJ, APRN, CNM

## 2018-06-19 NOTE — TELEPHONE ENCOUNTER
Attempted to call pt re SX Sched. VM is full and can not leave msg    Cheryl Rodriguez  Surgery Scheduler

## 2018-07-09 NOTE — TELEPHONE ENCOUNTER
Spoke to patient. She is heading out of town tonight for a couple months and is unsure she wants to proceed. Advised I would close this out and when she returns if she would like to reschedule to give us a call.  Pt understood  Closing Encounter    Cheryl Rodriguez  Surgery Scheduler

## 2019-03-09 ENCOUNTER — HEALTH MAINTENANCE LETTER (OUTPATIENT)
Age: 29
End: 2019-03-09

## 2019-07-01 NOTE — PROGRESS NOTES
SUBJECTIVE:   Tere is a 29 year old here for vaginal symptoms:  vaginal discharge, itching and some urinary discomfort. She is interested in STI testing and starting OCPs. She is due for a pap screen.                   Vaginal Symptoms     Onset: off and on for 2 months    Description:  Vaginal Discharge: white, curd-like and thick  Itching (Pruritis): Yes  Burning sensation:  No  Odor:  No  Irritation:  Yes    Accompanying Signs & Symptoms:  Pain with Urination: Yes: intermittent  Abdominal Pain:  Yes: intermittent  Fever: No   History:   Sexually active:  Yes  New Partner:  No  Possibility of Pregnancy:  No  Contraceptive type: condoms    Precipitating factors:   Recent Antibiotic Use: No    Alleviating factors:  none   Therapies Tried and outcome: Monitstat 5 - did not work  Previous Episodes of Vaginitis:  Yes: long time ago      Other associated symptoms: pelvic pain, dyspareunia, dysuria and urinary frequency.  History of STI's:  No  STI Testing offered: YES      LMP: Patient's last menstrual period was 06/14/2019.      Patient Active Problem List   Diagnosis     Abnormal uterine bleeding     Past Medical History:   Diagnosis Date     NO ACTIVE PROBLEMS      Past Surgical History:   Procedure Laterality Date     NO HISTORY OF SURGERY       Current Outpatient Medications   Medication Sig Dispense Refill     fluconazole (DIFLUCAN) 150 MG tablet Take 1 tablet (150 mg) by mouth once for 1 dose 1 tablet 0     levonorgestrel (PLAN B) 1.5 MG tablet Take 1 tablet (1.5 mg) by mouth once for 1 dose 1 tablet 0     norethindrone-ethinyl estradiol (ORTHO-NOVUM 1-35 TAB,NORTREL 1-35 TAB) 1-35 MG-MCG tablet Take 1 tablet by mouth daily 84 tablet 0     No Known Allergies    Health maintenance updated:  no    ROS:   12 point review of systems negative other than symptoms noted below.  Genitourinary: Cramps, Painful Urination, Vaginal Discharge and Vaginal Itching    PHYSICAL EXAM:    /70   Pulse 68   Ht 1.575 m (5'  "2\")   Wt 59.9 kg (132 lb)   LMP 06/14/2019   BMI 24.14 kg/m  , Body mass index is 24.14 kg/m .  General appearance:  healthy, alert and no distress  Pelvic Exam:  Vulva: No lesions, no adenopathy, BUS WNL  Vagina: Moist, pink, discharge consistent with yeast  well rugated, no lesions  Cervix:smooth, pink, no visible lesions  Rectal exam: deferred    ASSESSMENT/PLAN:     ICD-10-CM    1. Dysuria R30.0 UA with Microscopic     Urine Culture Aerobic Bacterial   2. Itching of vagina N89.8 Group B strep PCR     Gram stain     Wet prep     Yeast culture     fluconazole (DIFLUCAN) 150 MG tablet   3. Screening for cervical cancer Z12.4 Pap imaged thin layer screen with HPV - recommended age 30 - 65     HPV High Risk Types DNA Cervical   4. Screen for STD (sexually transmitted disease) Z11.3 NEISSERIA GONORRHOEA PCR     HIV Antigen Antibody Combo     Treponema Abs w Reflex to RPR and Titer   5. Screening for chlamydial disease Z11.8 CHLAMYDIA TRACHOMATIS PCR   6. BCP (birth control pills) initiation Z30.011 norethindrone-ethinyl estradiol (ORTHO-NOVUM 1-35 TAB,NORTREL 1-35 TAB) 1-35 MG-MCG tablet   7. Emergency contraception Z30.012 levonorgestrel (PLAN B) 1.5 MG tablet       Results for orders placed or performed in visit on 07/02/19   UA with Microscopic   Result Value Ref Range    Color Urine Yellow     Appearance Urine Clear     Glucose Urine Negative NEG^Negative mg/dL    Bilirubin Urine Negative NEG^Negative    Ketones Urine Negative NEG^Negative mg/dL    Specific Gravity Urine 1.020 1.003 - 1.035    pH Urine 5.5 5.0 - 7.0 pH    Protein Albumin Urine Negative NEG^Negative mg/dL    Urobilinogen Urine 0.2 0.2 - 1.0 EU/dL    Nitrite Urine Negative NEG^Negative    Blood Urine Negative NEG^Negative    Leukocyte Esterase Urine Negative NEG^Negative    Source Midstream Urine     WBC Urine 0 - 5 OTO5^0 - 5 /HPF    RBC Urine O - 2 OTO2^O - 2 /HPF    Squamous Epithelial /LPF Urine Moderate (A) FEW^Few /LPF    Bacteria Urine " Moderate (A) NEG^Negative /HPF   Wet prep   Result Value Ref Range    Specimen Description Vagina     Wet Prep No Trichomonas seen     Wet Prep No clue cells seen     Wet Prep No yeast seen     Wet Prep No WBC's seen          COUNSELING:  Diflucan given for pruritis.    The use of the oral contraceptive pill has been fully discussed with the patient. This includes the proper method to initiate  and continue the pill, the need for regular compliance to ensure adequate contraceptive effect, the physiology which makes the pill effective, the instructions for what to do in event of a missed pill, and warnings about anticipated minor side effects such as breakthrough spotting, nausea, breast tenderness, weight changes, acne, headaches, etc. She was informed of the irregular bleeding pattern that can occur when the pill is first started or a new form is changed over for the first 2-3 months.  She has been told of the more serious potential side effects such as MI, stroke, and deep vein thrombosis, all of which are very unlikely.  She has been asked to report any signs of such serious problems immediately.   She understands and wishes to take the medication as prescribed. She will return in 3 months for a BP check then and Rx for OCPs will be given for the rest of the year.     We discussed using emergency contraception for missed OCPs. Instructions for use, benefits, risks, and side effects discussed. Rx sent.     Return to clinic if symptoms persist or worsen    Yoko Alvarado, TEJ, APRN, CNM

## 2019-07-02 ENCOUNTER — OFFICE VISIT (OUTPATIENT)
Dept: MIDWIFE SERVICES | Facility: CLINIC | Age: 29
End: 2019-07-02
Payer: COMMERCIAL

## 2019-07-02 VITALS
BODY MASS INDEX: 24.29 KG/M2 | WEIGHT: 132 LBS | HEIGHT: 62 IN | HEART RATE: 68 BPM | DIASTOLIC BLOOD PRESSURE: 70 MMHG | SYSTOLIC BLOOD PRESSURE: 112 MMHG

## 2019-07-02 DIAGNOSIS — Z11.8 SCREENING FOR CHLAMYDIAL DISEASE: ICD-10-CM

## 2019-07-02 DIAGNOSIS — Z12.4 SCREENING FOR CERVICAL CANCER: ICD-10-CM

## 2019-07-02 DIAGNOSIS — Z30.011 BCP (BIRTH CONTROL PILLS) INITIATION: ICD-10-CM

## 2019-07-02 DIAGNOSIS — Z30.012 EMERGENCY CONTRACEPTION: ICD-10-CM

## 2019-07-02 DIAGNOSIS — R30.0 DYSURIA: Primary | ICD-10-CM

## 2019-07-02 DIAGNOSIS — Z11.3 SCREEN FOR STD (SEXUALLY TRANSMITTED DISEASE): ICD-10-CM

## 2019-07-02 DIAGNOSIS — N89.8 ITCHING OF VAGINA: ICD-10-CM

## 2019-07-02 LAB
ALBUMIN UR-MCNC: NEGATIVE MG/DL
APPEARANCE UR: CLEAR
BACTERIA #/AREA URNS HPF: ABNORMAL /HPF
BILIRUB UR QL STRIP: NEGATIVE
COLOR UR AUTO: YELLOW
GLUCOSE UR STRIP-MCNC: NEGATIVE MG/DL
GRAM STN SPEC: ABNORMAL
HGB UR QL STRIP: NEGATIVE
KETONES UR STRIP-MCNC: NEGATIVE MG/DL
LEUKOCYTE ESTERASE UR QL STRIP: NEGATIVE
Lab: ABNORMAL
NITRATE UR QL: NEGATIVE
NON-SQ EPI CELLS #/AREA URNS LPF: ABNORMAL /LPF
PH UR STRIP: 5.5 PH (ref 5–7)
RBC #/AREA URNS AUTO: ABNORMAL /HPF
SOURCE: ABNORMAL
SP GR UR STRIP: 1.02 (ref 1–1.03)
SPECIMEN SOURCE: ABNORMAL
SPECIMEN SOURCE: NORMAL
UROBILINOGEN UR STRIP-ACNC: 0.2 EU/DL (ref 0.2–1)
WBC #/AREA URNS AUTO: ABNORMAL /HPF
WET PREP SPEC: NORMAL

## 2019-07-02 PROCEDURE — 87591 N.GONORRHOEAE DNA AMP PROB: CPT | Performed by: ADVANCED PRACTICE MIDWIFE

## 2019-07-02 PROCEDURE — 87491 CHLMYD TRACH DNA AMP PROBE: CPT | Performed by: ADVANCED PRACTICE MIDWIFE

## 2019-07-02 PROCEDURE — 87102 FUNGUS ISOLATION CULTURE: CPT | Performed by: ADVANCED PRACTICE MIDWIFE

## 2019-07-02 PROCEDURE — 87389 HIV-1 AG W/HIV-1&-2 AB AG IA: CPT | Performed by: ADVANCED PRACTICE MIDWIFE

## 2019-07-02 PROCEDURE — 87088 URINE BACTERIA CULTURE: CPT | Performed by: ADVANCED PRACTICE MIDWIFE

## 2019-07-02 PROCEDURE — G0145 SCR C/V CYTO,THINLAYER,RESCR: HCPCS | Performed by: ADVANCED PRACTICE MIDWIFE

## 2019-07-02 PROCEDURE — 81001 URINALYSIS AUTO W/SCOPE: CPT | Performed by: ADVANCED PRACTICE MIDWIFE

## 2019-07-02 PROCEDURE — 86780 TREPONEMA PALLIDUM: CPT | Performed by: ADVANCED PRACTICE MIDWIFE

## 2019-07-02 PROCEDURE — 87186 SC STD MICRODIL/AGAR DIL: CPT | Performed by: ADVANCED PRACTICE MIDWIFE

## 2019-07-02 PROCEDURE — 87653 STREP B DNA AMP PROBE: CPT | Performed by: ADVANCED PRACTICE MIDWIFE

## 2019-07-02 PROCEDURE — G0476 HPV COMBO ASSAY CA SCREEN: HCPCS | Performed by: ADVANCED PRACTICE MIDWIFE

## 2019-07-02 PROCEDURE — 87210 SMEAR WET MOUNT SALINE/INK: CPT | Performed by: ADVANCED PRACTICE MIDWIFE

## 2019-07-02 PROCEDURE — 36415 COLL VENOUS BLD VENIPUNCTURE: CPT | Performed by: ADVANCED PRACTICE MIDWIFE

## 2019-07-02 PROCEDURE — 87106 FUNGI IDENTIFICATION YEAST: CPT | Performed by: ADVANCED PRACTICE MIDWIFE

## 2019-07-02 PROCEDURE — 99213 OFFICE O/P EST LOW 20 MIN: CPT | Performed by: ADVANCED PRACTICE MIDWIFE

## 2019-07-02 PROCEDURE — 87624 HPV HI-RISK TYP POOLED RSLT: CPT | Performed by: ADVANCED PRACTICE MIDWIFE

## 2019-07-02 PROCEDURE — 87205 SMEAR GRAM STAIN: CPT | Performed by: ADVANCED PRACTICE MIDWIFE

## 2019-07-02 PROCEDURE — 87086 URINE CULTURE/COLONY COUNT: CPT | Performed by: ADVANCED PRACTICE MIDWIFE

## 2019-07-02 RX ORDER — FLUCONAZOLE 150 MG/1
150 TABLET ORAL ONCE
Qty: 1 TABLET | Refills: 0 | Status: SHIPPED | OUTPATIENT
Start: 2019-07-02 | End: 2019-07-02

## 2019-07-02 RX ORDER — LEVONORGESTREL 1.5 MG/1
1.5 TABLET ORAL ONCE
Qty: 1 TABLET | Refills: 0 | Status: SHIPPED | OUTPATIENT
Start: 2019-07-02 | End: 2021-07-26

## 2019-07-02 ASSESSMENT — MIFFLIN-ST. JEOR: SCORE: 1272

## 2019-07-02 NOTE — RESULT ENCOUNTER NOTE
Ruben Carranza,    No yeast was seen on the initial test. But due to symptoms, I still think it is a good idea to take the Diflucan (fluconazole). I will notify you with other results when they become available.    Thanks,  Yoko Alvarado, DNP, APRN, CNM

## 2019-07-03 DIAGNOSIS — B37.31 YEAST VAGINITIS: ICD-10-CM

## 2019-07-03 DIAGNOSIS — N76.0 BV (BACTERIAL VAGINOSIS): Primary | ICD-10-CM

## 2019-07-03 DIAGNOSIS — B96.89 BV (BACTERIAL VAGINOSIS): Primary | ICD-10-CM

## 2019-07-03 LAB
C TRACH DNA SPEC QL NAA+PROBE: NEGATIVE
GP B STREP DNA SPEC QL NAA+PROBE: POSITIVE
HIV 1+2 AB+HIV1 P24 AG SERPL QL IA: NONREACTIVE
N GONORRHOEA DNA SPEC QL NAA+PROBE: NEGATIVE
SPECIMEN SOURCE: ABNORMAL
SPECIMEN SOURCE: NORMAL
SPECIMEN SOURCE: NORMAL
T PALLIDUM AB SER QL: NONREACTIVE

## 2019-07-03 RX ORDER — METRONIDAZOLE 500 MG/1
500 TABLET ORAL 2 TIMES DAILY
Qty: 14 TABLET | Refills: 0 | Status: SHIPPED | OUTPATIENT
Start: 2019-07-03 | End: 2019-07-10

## 2019-07-03 RX ORDER — FLUCONAZOLE 150 MG/1
150 TABLET ORAL ONCE
Qty: 1 TABLET | Refills: 0 | Status: SHIPPED | OUTPATIENT
Start: 2019-07-03 | End: 2019-07-03

## 2019-07-03 NOTE — RESULT ENCOUNTER NOTE
Ruben Carranza,    Some of your lab work came back. It looks like you have bacterial vaginosis along with yeast. I sent a prescription for Flagyl and more Diflucan.     I would like you to take the original Diflucan that I prescribed now. Flagyl will treat the bacterial vaginosis. You should take one 500 mg pill every 12 hours for 7 days. This pill has some side effects. It will taste metallically when swallowed (like sucking on a antonio). It also turns your urine a dark color. If it upsets your stomach, then eat something before taking it. Finally, it is important that you do NOT drink alcohol while on this medication and for 3 days after (a total of 10 days). On the last day of this medication, take the other Diflucan pill that I prescribed. This will help prevent a yeast infection from developing again. While on these medications I would like you to either not have sex or at least use a condom. Bacterial vaginosis is not a sexually transmitted infection, but it is more likely not to go away if you have sex during treatment.    If you have questions or concerns, please contact the clinic.    Thanks,  Yoko Alvarado, DNP, APRN, CNM

## 2019-07-04 NOTE — RESULT ENCOUNTER NOTE
Ruben Carranza,    Your last result came back. It showed you have a bacteria called Group B Strep in your vagina. This bacteria is commonly found in your vagina and we normally do not treat it. But if you are still having negative symptoms after your other treatment, then let us know and we will talk with you about it.    Thanks,  Yoko Alvarado, DNP, APRN, CNM

## 2019-07-05 DIAGNOSIS — N30.00 ACUTE CYSTITIS WITHOUT HEMATURIA: Primary | ICD-10-CM

## 2019-07-05 LAB
BACTERIA SPEC CULT: ABNORMAL
BACTERIA SPEC CULT: ABNORMAL
COPATH REPORT: NORMAL
Lab: ABNORMAL
PAP: NORMAL
SPECIMEN SOURCE: ABNORMAL
SPECIMEN SOURCE: ABNORMAL
YEAST SPEC QL CULT: ABNORMAL

## 2019-07-05 RX ORDER — NITROFURANTOIN 25; 75 MG/1; MG/1
100 CAPSULE ORAL 2 TIMES DAILY
Qty: 10 CAPSULE | Refills: 0 | Status: SHIPPED | OUTPATIENT
Start: 2019-07-05 | End: 2019-07-10

## 2019-07-06 LAB
BACTERIA SPEC CULT: ABNORMAL
SPECIMEN SOURCE: ABNORMAL

## 2019-07-08 LAB
FINAL DIAGNOSIS: NORMAL
HPV HR 12 DNA CVX QL NAA+PROBE: NEGATIVE
HPV16 DNA SPEC QL NAA+PROBE: NEGATIVE
HPV18 DNA SPEC QL NAA+PROBE: NEGATIVE
SPECIMEN DESCRIPTION: NORMAL
SPECIMEN SOURCE CVX/VAG CYTO: NORMAL

## 2020-03-11 ENCOUNTER — HEALTH MAINTENANCE LETTER (OUTPATIENT)
Age: 30
End: 2020-03-11

## 2020-12-27 ENCOUNTER — HEALTH MAINTENANCE LETTER (OUTPATIENT)
Age: 30
End: 2020-12-27

## 2021-01-27 ENCOUNTER — OFFICE VISIT (OUTPATIENT)
Dept: OBGYN | Facility: CLINIC | Age: 31
End: 2021-01-27
Payer: COMMERCIAL

## 2021-01-27 VITALS
DIASTOLIC BLOOD PRESSURE: 64 MMHG | SYSTOLIC BLOOD PRESSURE: 118 MMHG | HEART RATE: 52 BPM | BODY MASS INDEX: 27.97 KG/M2 | HEIGHT: 62 IN | WEIGHT: 152 LBS

## 2021-01-27 DIAGNOSIS — N97.0 INFERTILITY ASSOCIATED WITH ANOVULATION: ICD-10-CM

## 2021-01-27 DIAGNOSIS — N91.2 AMENORRHEA: Primary | ICD-10-CM

## 2021-01-27 DIAGNOSIS — Z01.812 PRE-PROCEDURE LAB EXAM: ICD-10-CM

## 2021-01-27 LAB — HCG UR QL: NEGATIVE

## 2021-01-27 PROCEDURE — 81025 URINE PREGNANCY TEST: CPT | Performed by: OBSTETRICS & GYNECOLOGY

## 2021-01-27 PROCEDURE — 99213 OFFICE O/P EST LOW 20 MIN: CPT | Performed by: OBSTETRICS & GYNECOLOGY

## 2021-01-27 RX ORDER — MEDROXYPROGESTERONE ACETATE 10 MG
10 TABLET ORAL DAILY
Qty: 10 TABLET | Refills: 0 | Status: SHIPPED | OUTPATIENT
Start: 2021-01-27 | End: 2021-07-29

## 2021-01-27 ASSESSMENT — MIFFLIN-ST. JEOR: SCORE: 1352.72

## 2021-01-27 NOTE — PROGRESS NOTES
SUBJECTIVE:                                                   Tere Alves is a 31 year old female who presents to clinic today for the following health issue(s):  Patient presents with:  Consult: Irregular period and trying to get pregnant          HPI:  hcg negative  Has been trying to get pregnant 6 yrs  No periods last 2 months  No prior pregnancies  Long history of infrequent periods        No LMP recorded. (Menstrual status: Irregular Periods)..     Patient is sexually active, .  Using none for contraception.    reports that she has never smoked. She has never used smokeless tobacco.    STD testing offered?  Declined    Health maintenance updated:  yes    Today's PHQ-2 Score:   PHQ-2 (  [employer]) 2019   Q1: Little interest or pleasure in doing things 0   Q2: Feeling down, depressed or hopeless 0   PHQ-2 Score 0     Today's PHQ-9 Score: No flowsheet data found.  Today's CHERELLE-7 Score: No flowsheet data found.    Problem list and histories reviewed & adjusted, as indicated.  Additional history: as documented.    Patient Active Problem List   Diagnosis     Abnormal uterine bleeding     Past Surgical History:   Procedure Laterality Date     NO HISTORY OF SURGERY        Social History     Tobacco Use     Smoking status: Never Smoker     Smokeless tobacco: Never Used   Substance Use Topics     Alcohol use: Yes     Comment: couple drinks/month         Negative family history of: Diabetes, Myocardial Infarction, Cerebrovascular Disease, Coronary Artery Disease Early Onset, Breast Cancer, Ovarian Cancer, Colon Cancer            Current Outpatient Medications   Medication Sig     medroxyPROGESTERone (PROVERA) 10 MG tablet Take 1 tablet (10 mg) by mouth daily To start a period.     levonorgestrel (PLAN B) 1.5 MG tablet Take 1 tablet (1.5 mg) by mouth once for 1 dose     norethindrone-ethinyl estradiol (ORTHO-NOVUM 1-35 TAB,NORTREL 1-35 TAB) 1-35 MG-MCG tablet Take 1 tablet by mouth daily     No current  "facility-administered medications for this visit.      No Known Allergies    ROS:  12 point review of systems negative other than symptoms noted below or in the HPI.  Genitourinary: Irregular Menses  No urinary frequency or dysuria, bladder or kidney problems      OBJECTIVE:     /64   Pulse 52   Ht 1.575 m (5' 2\")   Wt 68.9 kg (152 lb)   BMI 27.80 kg/m    Body mass index is 27.8 kg/m .    Exam:  Constitutional:  Appearance: Well nourished, well developed alert, in no acute distress  Neck:  Lymph Nodes:  No lymphadenopathy present; Thyroid:  Gland size normal, nontender, no nodules or masses present on palpation  Gastrointestinal:  Abdominal Examination:  Abdomen nontender to palpation, tone normal without rigidity or guarding, no masses present, umbilicus without lesions; Liver/Spleen:  No hepatomegaly present, liver nontender to palpation; Hernias:  No hernias present  Skin: General Inspection:  No rashes present, no lesions present, no areas of discoloration.  Has some acne +    No striae   No hirsitism  Neurologic:  Mental Status:  Oriented X3.  Normal strength and tone, sensory exam grossly normal, mentation intact and speech normal.    Psychiatric:  Mentation appears normal and affect normal/bright.     In-Clinic Test Results:  Results for orders placed or performed in visit on 01/27/21 (from the past 24 hour(s))   HCG Qual, Urine ([id number])   Result Value Ref Range    HCG Qual Urine Negative NEG^Negative       ASSESSMENT/PLAN:                                                        ICD-10-CM    1. Pre-procedure lab exam  Z01.812 HCG Qual, Urine ([id number])   2. Amenorrhea  N91.2 TSH with free T4 reflex     Follicle stimulating hormone     Anti-Mullerian hormone     Estradiol     Prolactin     medroxyPROGESTERone (PROVERA) 10 MG tablet       Discussed amenorrhea  Will start provera 10 mg x 10 days  First day of bleeding is day 1 of cycle, call to make lab appt for about day 3  Lab orders futured out- PL, " AMH, TSH, E2, FSH  PCOS is likely cause of annovulation with her history    IF labs ok, will schedule pelvic us to check uterus and ovaries  Then trial clomid or femara for few months    Hsg, SA eventually  Given infertility printed instructions    Time for visit and counseling and charting 15 minutes today, new patient    Elham Salvador MD  [facility] [place]

## 2021-02-13 ENCOUNTER — MYC MEDICAL ADVICE (OUTPATIENT)
Dept: OBGYN | Facility: CLINIC | Age: 31
End: 2021-02-13

## 2021-02-13 DIAGNOSIS — N91.2 AMENORRHEA: Primary | ICD-10-CM

## 2021-02-15 NOTE — TELEPHONE ENCOUNTER
Discussed amenorrhea  Will start provera 10 mg x 10 days  First day of bleeding is day 1 of cycle, call to make lab appt for about day 3  Lab orders futured out- PL, AMH, TSH, E2, FSH  PCOS is likely cause of annovulation with her history    Amanda Goldberg RN on 2/15/2021 at 9:17 AM

## 2021-02-15 NOTE — TELEPHONE ENCOUNTER
Have her come in to clinic and do the labs we had planned tomorrow.  Also do a serum hcg. ( to prove not pregnant although this is unlikely)     I need to know if there is any other reason that she isn't getting a period like a thyroid problem for example.

## 2021-02-16 DIAGNOSIS — N91.2 AMENORRHEA: ICD-10-CM

## 2021-02-16 LAB
ESTRADIOL SERPL-MCNC: 54 PG/ML
FSH SERPL-ACNC: 6.5 IU/L
HCG SERPL QL: NEGATIVE
PROLACTIN SERPL-MCNC: 15 UG/L (ref 3–27)

## 2021-02-16 PROCEDURE — 36415 COLL VENOUS BLD VENIPUNCTURE: CPT | Performed by: OBSTETRICS & GYNECOLOGY

## 2021-02-16 PROCEDURE — 99000 SPECIMEN HANDLING OFFICE-LAB: CPT | Performed by: OBSTETRICS & GYNECOLOGY

## 2021-02-16 PROCEDURE — 84146 ASSAY OF PROLACTIN: CPT | Performed by: OBSTETRICS & GYNECOLOGY

## 2021-02-16 PROCEDURE — 83001 ASSAY OF GONADOTROPIN (FSH): CPT | Performed by: OBSTETRICS & GYNECOLOGY

## 2021-02-16 PROCEDURE — 83520 IMMUNOASSAY QUANT NOS NONAB: CPT | Mod: 90 | Performed by: OBSTETRICS & GYNECOLOGY

## 2021-02-16 PROCEDURE — 84443 ASSAY THYROID STIM HORMONE: CPT | Performed by: OBSTETRICS & GYNECOLOGY

## 2021-02-16 PROCEDURE — 84703 CHORIONIC GONADOTROPIN ASSAY: CPT | Performed by: OBSTETRICS & GYNECOLOGY

## 2021-02-16 PROCEDURE — 82670 ASSAY OF TOTAL ESTRADIOL: CPT | Performed by: OBSTETRICS & GYNECOLOGY

## 2021-02-17 LAB — TSH SERPL DL<=0.005 MIU/L-ACNC: 2.63 MU/L (ref 0.4–4)

## 2021-02-18 LAB — MIS SERPL-MCNC: 3.4 NG/ML (ref 0.18–11.71)

## 2021-04-25 ENCOUNTER — HEALTH MAINTENANCE LETTER (OUTPATIENT)
Age: 31
End: 2021-04-25

## 2021-07-23 ENCOUNTER — TELEPHONE (OUTPATIENT)
Dept: OBGYN | Facility: CLINIC | Age: 31
End: 2021-07-23

## 2021-07-27 NOTE — TELEPHONE ENCOUNTER
PT is having periods for past 2 weeks - heavy and blood clots with back pain and bad cramps  thus wanting to discuss w provider - earliest was Sundar 8/2    Discussed sx's: asked her multiple times and she states she is changing a pad hourly for the last 3 weeks.  Recommended ER for evaluation - no meds will be given without a proper evaluation as this is abnormal and need to find a reason why she is bleeding this much. Need to check hgb and US and if indeed bleeding where she is changing a regular pad hourly. She is at work doing this as well. Stated that she doesn't feel that weak.  Having back ache and cramps but tolerable.    Insisted on ER evaluation for reported sx's. Clinic not an emergent care and not appropriate.    She verbalized she will go to ER.    Esthela Winchester RN on 7/27/2021 at 11:10 AM

## 2021-07-28 DIAGNOSIS — N92.0 MENORRHAGIA: Primary | ICD-10-CM

## 2021-07-28 NOTE — PROGRESS NOTES
SUBJECTIVE:                                                   Tere Alves is a 31 year old female who presents to clinic today for the following health issue(s):  Patient presents with:  Vaginal Bleeding: Had a period mid may and now has had bleeding on/off since that time. Has had some minor cramping.      HPI: The patient is seen at this time for ongoing bleeding.  She has a past history of irregular anovulatory bleeding.  She was treated with Provera in January and responded with a good cleanout cycle and more regular periods.  She had a heavy cycle in May and has been spotting or bleeding heavily every day since.  Hemoglobin today is 9.4 g%.  The patient is desiring pregnancy but has had very irregular cycles.      Patient's last menstrual period was 2021 (approximate)..     Patient is sexually active, .  Using none for contraception.    reports that she has never smoked. She has never used smokeless tobacco.    STD testing offered?  Accepted    Health maintenance updated:  no    Today's PHQ-2 Score:   PHQ-2 (  [employer]) 2019   Q1: Little interest or pleasure in doing things 0   Q2: Feeling down, depressed or hopeless 0   PHQ-2 Score 0     Today's PHQ-9 Score: No flowsheet data found.  Today's CHERELLE-7 Score: No flowsheet data found.    Problem list and histories reviewed & adjusted, as indicated.  Additional history: as documented.    Patient Active Problem List   Diagnosis     Abnormal uterine bleeding     Past Surgical History:   Procedure Laterality Date     NO HISTORY OF SURGERY        Social History     Tobacco Use     Smoking status: Never Smoker     Smokeless tobacco: Never Used   Substance Use Topics     Alcohol use: Yes     Comment: couple drinks/month         Negative family history of: Diabetes, Myocardial Infarction, Cerebrovascular Disease, Coronary Artery Disease Early Onset, Breast Cancer, Ovarian Cancer, Colon Cancer            No current outpatient medications on file.  "    No current facility-administered medications for this visit.     No Known Allergies    ROS:  12 point review of systems negative other than symptoms noted below or in the HPI.  Genitourinary: Cramps and Irregular Menses  No urinary frequency or dysuria, bladder or kidney problems      OBJECTIVE:     /70   Pulse 76   Ht 1.575 m (5' 2\")   Wt 67.1 kg (148 lb)   LMP 05/17/2021 (Approximate)   BMI 27.07 kg/m    Body mass index is 27.07 kg/m .    Exam:  Constitutional:  Appearance: Well nourished, well developed alert, in no acute distress  Gastrointestinal:  Abdominal Examination:  Abdomen nontender to palpation, tone normal without rigidity or guarding, no masses present, umbilicus without lesions; Liver/Spleen:  No hepatomegaly present, liver nontender to palpation; Hernias:  No hernias present  Lymphatic: Lymph Nodes:  No other lymphadenopathy present  Skin: General Inspection:  No rashes present, no lesions present, no areas of discoloration.  Neurologic:  Mental Status:  Oriented X3.  Normal strength and tone, sensory exam grossly normal, mentation intact and speech normal.    Psychiatric:  Mentation appears normal and affect normal/bright.  Pelvic Exam:  External Genitalia:     Normal appearance for age, no discharge present, no tenderness present, no inflammatory lesions present, color normal  Vagina:     Normal vaginal vault without central or paravaginal defects, no discharge present, no inflammatory lesions present, no masses present  Bladder:     Nontender to palpation  Urethra:   Urethral Body:  Urethra palpation normal, urethra structural support normal   Urethral Meatus:  No erythema or lesions present  Cervix:     Appearance healthy, no lesions present, nontender to palpation, no bleeding present  Uterus:     Uterus: Soft, mildly enlarged and tender, midplane in position.   Adnexa:     No adnexal tenderness present, no adnexal masses present  Perineum:     Perineum within normal limits, no " evidence of trauma, no rashes or skin lesions present  Anus:     Anus within normal limits, no hemorrhoids present  Inguinal Lymph Nodes:     No lymphadenopathy present  Pubic Hair:     Normal pubic hair distribution for age  Genitalia and Groin:     No rashes present, no lesions present, no areas of discoloration, no masses present       In-Clinic Test Results:  Results for orders placed or performed in visit on 07/29/21 (from the past 24 hour(s))   Hemoglobin   Result Value Ref Range    Hemoglobin 9.4 (L) 11.7 - 15.7 g/dL       ASSESSMENT/PLAN:                                                        ICD-10-CM    1. Anemia due to acute blood loss  D62    2. Menorrhagia with irregular cycle  N92.1 US Transvaginal Non OB       31-year-old with chronic anovulation and menorrhagia.  She is now anemic.  Her lab work-up earlier this year showed that she was not menopausal with normal thyroid function and normal prolactin.  Her uterus is boggy and tender and mildly enlarged.  We will perform an ultrasound and determine if there is any intrauterine pathology to attribute the heavy bleeding to.        Rich Thorne MD  [facility] [place]

## 2021-07-29 ENCOUNTER — LAB (OUTPATIENT)
Dept: LAB | Facility: CLINIC | Age: 31
End: 2021-07-29
Payer: COMMERCIAL

## 2021-07-29 ENCOUNTER — OFFICE VISIT (OUTPATIENT)
Dept: OBGYN | Facility: CLINIC | Age: 31
End: 2021-07-29
Payer: COMMERCIAL

## 2021-07-29 VITALS
BODY MASS INDEX: 27.23 KG/M2 | HEIGHT: 62 IN | WEIGHT: 148 LBS | HEART RATE: 76 BPM | SYSTOLIC BLOOD PRESSURE: 112 MMHG | DIASTOLIC BLOOD PRESSURE: 70 MMHG

## 2021-07-29 DIAGNOSIS — D62 ANEMIA DUE TO ACUTE BLOOD LOSS: Primary | ICD-10-CM

## 2021-07-29 DIAGNOSIS — N92.0 MENORRHAGIA: ICD-10-CM

## 2021-07-29 DIAGNOSIS — N92.1 MENORRHAGIA WITH IRREGULAR CYCLE: ICD-10-CM

## 2021-07-29 LAB — HGB BLD-MCNC: 9.4 G/DL (ref 11.7–15.7)

## 2021-07-29 PROCEDURE — 99213 OFFICE O/P EST LOW 20 MIN: CPT | Performed by: OBSTETRICS & GYNECOLOGY

## 2021-07-29 PROCEDURE — 85018 HEMOGLOBIN: CPT

## 2021-07-29 PROCEDURE — 36415 COLL VENOUS BLD VENIPUNCTURE: CPT

## 2021-07-29 ASSESSMENT — MIFFLIN-ST. JEOR: SCORE: 1334.57

## 2021-08-10 NOTE — H&P (VIEW-ONLY)
SUBJECTIVE:                                                   Tere Alves is a 31 year old female who presents to clinic today for the following health issue(s):  Patient presents with:  Ultrasound      HPI: The patient is a 31-year-old who has had a very prolonged episode of bleeding that started in May and ended a week ago.  She has been found to be anemic at her last visit 2 weeks ago.  Ultrasound was performed today.  She is still desirous of fertility.      No LMP recorded. (Menstrual status: Irregular Periods)..     Patient is sexually active, .  Using none for contraception.    reports that she has never smoked. She has never used smokeless tobacco.    STD testing offered?  Declined    Health maintenance updated:  yes    Today's PHQ-2 Score:   PHQ-2 (  [employer]) 2021   Q1: Little interest or pleasure in doing things 0   Q2: Feeling down, depressed or hopeless 0   PHQ-2 Score 0     Today's PHQ-9 Score: No flowsheet data found.  Today's CHERELLE-7 Score: No flowsheet data found.    Problem list and histories reviewed & adjusted, as indicated.  Additional history: as documented.    Patient Active Problem List   Diagnosis     Abnormal uterine bleeding     Past Surgical History:   Procedure Laterality Date     NO HISTORY OF SURGERY        Social History     Tobacco Use     Smoking status: Never Smoker     Smokeless tobacco: Never Used   Substance Use Topics     Alcohol use: Yes     Comment: couple drinks/month         Negative family history of: Diabetes, Myocardial Infarction, Cerebrovascular Disease, Coronary Artery Disease Early Onset, Breast Cancer, Ovarian Cancer, Colon Cancer            No current outpatient medications on file.     No current facility-administered medications for this visit.     No Known Allergies    ROS:  12 point review of systems negative other than symptoms noted below or in the HPI.  Genitourinary: Irregular Menses  No urinary frequency or dysuria, bladder or kidney  "problems      OBJECTIVE:     /72   Pulse 76   Ht 1.575 m (5' 2\")   Wt 66.7 kg (147 lb)   BMI 26.89 kg/m    Body mass index is 26.89 kg/m .    Exam:  Constitutional:  Appearance: Well nourished, well developed alert, in no acute distress  Chest:  Respiratory Effort:  Breathing unlabored. Clear to auscultation bilaterally.   Cardiovascular: Heart: Auscultation:  Regular rate, normal rhythm, no murmurs present  Gastrointestinal:  Abdominal Examination:  Abdomen nontender to palpation, tone normal without rigidity or guarding, no masses present, umbilicus without lesions; Liver/Spleen:  No hepatomegaly present, liver nontender to palpation; Hernias:  No hernias present  Lymphatic: Lymph Nodes:  No other lymphadenopathy present  Skin: General Inspection:  No rashes present, no lesions present, no areas of discoloration.  Neurologic:  Mental Status:  Oriented X3.  Normal strength and tone, sensory exam grossly normal, mentation intact and speech normal.    Psychiatric:  Mentation appears normal and affect normal/bright.  No Pelvic Exam performed     In-Clinic Test Results:  Results for orders placed or performed in visit on 08/12/21   US Transvaginal Non OB     Status: None    Narrative    US Transvaginal Non OB  Order #: [id number] Accession #: [id number]  Study Notes     Laverne Leblanc on 8/12/2021  1:52 PM      Gynecological Ultrasound Report  Pelvic U/S - Transvaginal  [facility]  Referring Provider: Rich Thorne MD  Sonographer:  Laverne Leblanc RDMS  Indication: Bleeding/Menses- Menorrhagia (heavy menses)  LMP: 05/14/2021 (Approximate) - Irregular periods  History:   Gynecological Ultrasonography:   Uterus: anteverted. Contour is smooth/regular.  Size: 8.6 x 4.8 x 4.0 cm  Endometrium: Thickness Total 11.3 mm  Findings: Probable polyp vs fibroid 1.2 x 0.6 x 1.2cm, hypoechoic area 0.8   x 0.6 x 0.7cm  Right Ovary: 3.6 x 1.7 x 1.8 cm. Wnl, paraovarian cyst 0.8 x 0.6 x 0.8cm  Left Ovary: " 3.2 x 2.8 x 2.2 cm. Complex cyst 2.4 x 1.5 x 1.8 cm  Cul de Sac Free Fluid: No free fluid     Impression: Intrauterine mass, right paraovarian cyst, left complex   ovarian cyst       RACHANA DAY        Discussed in office         ASSESSMENT/PLAN:                                                        ICD-10-CM    1. Menorrhagia with irregular cycle  N92.1    2. Endometrial polyp  N84.0      Patient with intrauterine filling defect that will either turn out to be polyps or fibroids.  She also has a complex cyst of the left ovary that is most likely postovulatory.  She has been taking her iron.  We recommend a hysteroscopy with removal of what ever the masses are and cleanout without any ablation.  The risks and complications as well as an ACOG brochure on the procedure have been reviewed with the patient.        Rich Thorne MD  [facility] [place]

## 2021-08-10 NOTE — PROGRESS NOTES
SUBJECTIVE:                                                   Tere Alves is a 31 year old female who presents to clinic today for the following health issue(s):  Patient presents with:  Ultrasound      HPI: The patient is a 31-year-old who has had a very prolonged episode of bleeding that started in May and ended a week ago.  She has been found to be anemic at her last visit 2 weeks ago.  Ultrasound was performed today.  She is still desirous of fertility.      No LMP recorded. (Menstrual status: Irregular Periods)..     Patient is sexually active, .  Using none for contraception.    reports that she has never smoked. She has never used smokeless tobacco.    STD testing offered?  Declined    Health maintenance updated:  yes    Today's PHQ-2 Score:   PHQ-2 (  [employer]) 2021   Q1: Little interest or pleasure in doing things 0   Q2: Feeling down, depressed or hopeless 0   PHQ-2 Score 0     Today's PHQ-9 Score: No flowsheet data found.  Today's CHERELLE-7 Score: No flowsheet data found.    Problem list and histories reviewed & adjusted, as indicated.  Additional history: as documented.    Patient Active Problem List   Diagnosis     Abnormal uterine bleeding     Past Surgical History:   Procedure Laterality Date     NO HISTORY OF SURGERY        Social History     Tobacco Use     Smoking status: Never Smoker     Smokeless tobacco: Never Used   Substance Use Topics     Alcohol use: Yes     Comment: couple drinks/month         Negative family history of: Diabetes, Myocardial Infarction, Cerebrovascular Disease, Coronary Artery Disease Early Onset, Breast Cancer, Ovarian Cancer, Colon Cancer            No current outpatient medications on file.     No current facility-administered medications for this visit.     No Known Allergies    ROS:  12 point review of systems negative other than symptoms noted below or in the HPI.  Genitourinary: Irregular Menses  No urinary frequency or dysuria, bladder or kidney  "problems      OBJECTIVE:     /72   Pulse 76   Ht 1.575 m (5' 2\")   Wt 66.7 kg (147 lb)   BMI 26.89 kg/m    Body mass index is 26.89 kg/m .    Exam:  Constitutional:  Appearance: Well nourished, well developed alert, in no acute distress  Chest:  Respiratory Effort:  Breathing unlabored. Clear to auscultation bilaterally.   Cardiovascular: Heart: Auscultation:  Regular rate, normal rhythm, no murmurs present  Gastrointestinal:  Abdominal Examination:  Abdomen nontender to palpation, tone normal without rigidity or guarding, no masses present, umbilicus without lesions; Liver/Spleen:  No hepatomegaly present, liver nontender to palpation; Hernias:  No hernias present  Lymphatic: Lymph Nodes:  No other lymphadenopathy present  Skin: General Inspection:  No rashes present, no lesions present, no areas of discoloration.  Neurologic:  Mental Status:  Oriented X3.  Normal strength and tone, sensory exam grossly normal, mentation intact and speech normal.    Psychiatric:  Mentation appears normal and affect normal/bright.  No Pelvic Exam performed     In-Clinic Test Results:  Results for orders placed or performed in visit on 08/12/21   US Transvaginal Non OB     Status: None    Narrative    US Transvaginal Non OB  Order #: [id number] Accession #: [id number]  Study Notes     Laverne Leblanc on 8/12/2021  1:52 PM      Gynecological Ultrasound Report  Pelvic U/S - Transvaginal  [facility]  Referring Provider: Rich Thorne MD  Sonographer:  Laverne Leblanc RDMS  Indication: Bleeding/Menses- Menorrhagia (heavy menses)  LMP: 05/14/2021 (Approximate) - Irregular periods  History:   Gynecological Ultrasonography:   Uterus: anteverted. Contour is smooth/regular.  Size: 8.6 x 4.8 x 4.0 cm  Endometrium: Thickness Total 11.3 mm  Findings: Probable polyp vs fibroid 1.2 x 0.6 x 1.2cm, hypoechoic area 0.8   x 0.6 x 0.7cm  Right Ovary: 3.6 x 1.7 x 1.8 cm. Wnl, paraovarian cyst 0.8 x 0.6 x 0.8cm  Left Ovary: " 3.2 x 2.8 x 2.2 cm. Complex cyst 2.4 x 1.5 x 1.8 cm  Cul de Sac Free Fluid: No free fluid     Impression: Intrauterine mass, right paraovarian cyst, left complex   ovarian cyst       RACHANA DAY        Discussed in office         ASSESSMENT/PLAN:                                                        ICD-10-CM    1. Menorrhagia with irregular cycle  N92.1    2. Endometrial polyp  N84.0      Patient with intrauterine filling defect that will either turn out to be polyps or fibroids.  She also has a complex cyst of the left ovary that is most likely postovulatory.  She has been taking her iron.  We recommend a hysteroscopy with removal of what ever the masses are and cleanout without any ablation.  The risks and complications as well as an ACOG brochure on the procedure have been reviewed with the patient.        Rich Thorne MD  [facility] [place]

## 2021-08-12 ENCOUNTER — TELEPHONE (OUTPATIENT)
Dept: OBGYN | Facility: CLINIC | Age: 31
End: 2021-08-12

## 2021-08-12 ENCOUNTER — PREP FOR PROCEDURE (OUTPATIENT)
Dept: OBGYN | Facility: CLINIC | Age: 31
End: 2021-08-12

## 2021-08-12 ENCOUNTER — ANCILLARY PROCEDURE (OUTPATIENT)
Dept: ULTRASOUND IMAGING | Facility: CLINIC | Age: 31
End: 2021-08-12
Attending: OBSTETRICS & GYNECOLOGY
Payer: COMMERCIAL

## 2021-08-12 ENCOUNTER — OFFICE VISIT (OUTPATIENT)
Dept: OBGYN | Facility: CLINIC | Age: 31
End: 2021-08-12
Attending: OBSTETRICS & GYNECOLOGY
Payer: COMMERCIAL

## 2021-08-12 VITALS
WEIGHT: 147 LBS | HEART RATE: 76 BPM | SYSTOLIC BLOOD PRESSURE: 118 MMHG | BODY MASS INDEX: 27.05 KG/M2 | HEIGHT: 62 IN | DIASTOLIC BLOOD PRESSURE: 72 MMHG

## 2021-08-12 DIAGNOSIS — Z11.59 ENCOUNTER FOR SCREENING FOR OTHER VIRAL DISEASES: Primary | ICD-10-CM

## 2021-08-12 DIAGNOSIS — N92.1 MENORRHAGIA WITH IRREGULAR CYCLE: ICD-10-CM

## 2021-08-12 DIAGNOSIS — N84.0 ENDOMETRIAL POLYP: ICD-10-CM

## 2021-08-12 DIAGNOSIS — N92.1 MENORRHAGIA WITH IRREGULAR CYCLE: Primary | ICD-10-CM

## 2021-08-12 DIAGNOSIS — N84.0 ENDOMETRIAL POLYP: Primary | ICD-10-CM

## 2021-08-12 PROCEDURE — 76830 TRANSVAGINAL US NON-OB: CPT | Performed by: OBSTETRICS & GYNECOLOGY

## 2021-08-12 PROCEDURE — 99213 OFFICE O/P EST LOW 20 MIN: CPT | Performed by: OBSTETRICS & GYNECOLOGY

## 2021-08-12 ASSESSMENT — MIFFLIN-ST. JEOR: SCORE: 1330.04

## 2021-08-12 NOTE — TELEPHONE ENCOUNTER
Type of surgery: [facility] w/[facility]  Location of surgery: [place] OR  Date and time of surgery: 8/24/2021 7:20a   Surgeon: MINNA  Pre-Op Appt Date: 8/12/2021  Post-Op Appt Date: TBD   Packet sent out: HANDED 8/12/2021  Pre-cert/Authorization completed:  TBD  Date: 8/12/2021 [facility] w/[facility]    COVID TEST 8/21/2021 9:10a LAQUITA White  Surgery Scheduler    CPT 77578

## 2021-08-17 NOTE — H&P
2021  [facility] [place]   Rich Thorne MD  OB/Gyn  Menorrhagia with irregular cycle +1 more  Dx  Ultrasound; Referred by Rich Thorne MD  Reason for Visit   Progress Notes  Rich Thorne MD (Physician)     OB/Gyn        SUBJECTIVE:                                                   Tere Alves is a 31 year old female who presents to clinic today for the following health issue(s):  Patient presents with:  Ultrasound        HPI: The patient is a 31-year-old who has had a very prolonged episode of bleeding that started in May and ended a week ago.  She has been found to be anemic at her last visit 2 weeks ago.  Ultrasound was performed today.  She is still desirous of fertility.        No LMP recorded. (Menstrual status: Irregular Periods)..      Patient is sexually active, .  Using none for contraception.    reports that she has never smoked. She has never used smokeless tobacco.     STD testing offered?  Declined     Health maintenance updated:  yes     Today's PHQ-2 Score:   PHQ-2 (  [employer]) 2021   Q1: Little interest or pleasure in doing things 0   Q2: Feeling down, depressed or hopeless 0   PHQ-2 Score 0      Today's PHQ-9 Score: No flowsheet data found.  Today's CHERELLE-7 Score: No flowsheet data found.     Problem list and histories reviewed & adjusted, as indicated.  Additional history: as documented.         Patient Active Problem List   Diagnosis     Abnormal uterine bleeding             Past Surgical History:   Procedure Laterality Date     NO HISTORY OF SURGERY           Social History            Tobacco Use     Smoking status: Never Smoker     Smokeless tobacco: Never Used   Substance Use Topics     Alcohol use: Yes       Comment: couple drinks/month               Negative family history of: Diabetes, Myocardial Infarction, Cerebrovascular Disease, Coronary Artery Disease Early Onset, Breast Cancer, Ovarian Cancer, Colon Cancer              No  "current outpatient medications on file.      No current facility-administered medications for this visit.      No Known Allergies     ROS:  12 point review of systems negative other than symptoms noted below or in the HPI.  Genitourinary: Irregular Menses  No urinary frequency or dysuria, bladder or kidney problems        OBJECTIVE:      /72   Pulse 76   Ht 1.575 m (5' 2\")   Wt 66.7 kg (147 lb)   BMI 26.89 kg/m    Body mass index is 26.89 kg/m .     Exam:  Constitutional:  Appearance: Well nourished, well developed alert, in no acute distress  Chest:  Respiratory Effort:  Breathing unlabored. Clear to auscultation bilaterally.   Cardiovascular: Heart: Auscultation:  Regular rate, normal rhythm, no murmurs present  Gastrointestinal:  Abdominal Examination:  Abdomen nontender to palpation, tone normal without rigidity or guarding, no masses present, umbilicus without lesions; Liver/Spleen:  No hepatomegaly present, liver nontender to palpation; Hernias:  No hernias present  Lymphatic: Lymph Nodes:  No other lymphadenopathy present  Skin: General Inspection:  No rashes present, no lesions present, no areas of discoloration.  Neurologic:  Mental Status:  Oriented X3.  Normal strength and tone, sensory exam grossly normal, mentation intact and speech normal.    Psychiatric:  Mentation appears normal and affect normal/bright.  No Pelvic Exam performed      In-Clinic Test Results:  Results for orders placed or performed in visit on 08/12/21   US Transvaginal Non OB     Status: None     Narrative     US Transvaginal Non OB  Order #: [id number] Accession #: [id number]  Study Notes     Laverne Leblanc on 8/12/2021  1:52 PM      Gynecological Ultrasound Report  Pelvic U/S - Transvaginal  [facility]  Referring Provider: Rich Thorne MD  Sonographer:  Laverne Leblanc RDMS  Indication: Bleeding/Menses- Menorrhagia (heavy menses)  LMP: 05/14/2021 (Approximate) - Irregular periods  History: " "  Gynecological Ultrasonography:   Uterus: anteverted. Contour is smooth/regular.  Size: 8.6 x 4.8 x 4.0 cm  Endometrium: Thickness Total 11.3 mm  Findings: Probable polyp vs fibroid 1.2 x 0.6 x 1.2cm, hypoechoic area 0.8   x 0.6 x 0.7cm  Right Ovary: 3.6 x 1.7 x 1.8 cm. Wnl, paraovarian cyst 0.8 x 0.6 x 0.8cm  Left Ovary: 3.2 x 2.8 x 2.2 cm. Complex cyst 2.4 x 1.5 x 1.8 cm  Cul de Sac Free Fluid: No free fluid     Impression: Intrauterine mass, right paraovarian cyst, left complex   ovarian cyst        EM Fadumo DAY         Discussed in office            ASSESSMENT/PLAN:                                                           ICD-10-CM     1. Menorrhagia with irregular cycle  N92.1     2. Endometrial polyp  N84.0        Patient with intrauterine filling defect that will either turn out to be polyps or fibroids.  She also has a complex cyst of the left ovary that is most likely postovulatory.  She has been taking her iron.  We recommend a hysteroscopy with removal of what ever the masses are and cleanout without any ablation.  The risks and complications as well as an ACOG brochure on the procedure have been reviewed with the patient.           Rich Thorne MD  [facility] [place]      Instructions     After Visit Summary (Automatic SnapShot taken 8/12/2021)  Additional Documentation    Vitals:  /72   Pulse 76   Ht 1.575 m (5' 2\")   Wt 66.7 kg (147 lb)   BMI 26.89 kg/m    BSA 1.71 m       More Vitals   Flowsheets:  NICU VS,   Anthropometrics,   Vitals Reassessment,   Ambulatory Assessments      Encounter Info:  Billing Info,   History,   Allergies,   Detailed Report      AVS Reports    Date/Time Report Action User   8/12/2021  2:58 PM After Visit Summary Automatically Generated Rich Thorne MD   Encounter Information     Provider Department Encounter # Center   8/12/2021 2:00 PM Rich Thorne MD  Ob/Gyn [id number] [facility]   Reviewed this Encounter     Medications " Problems Allergies History   Chela Allison MA   Reviewed ADL, Alcohol, Custom, Drug Use, Family, Medical, Sexual Activity, Surgical, Tobacco   Communicable/Travel screen    Communicable/Travel Screening 7/2/2019 1/27/2021 2/16/2021 7/29/2021 8/12/2021   In the last month, have you been in contact with someone who was confirmed or suspected to have Coronavirus / COVID-19? - No / Unsure No / Unsure No / Unsure No / Unsure   Do you have any of the following symptoms? - None of these None of these None of these None of these   Have you traveled internationally in the last month? No No No - -   View Complete Flowsheet    Orders Placed     None  Medication Changes       None     Medication List   Visit Diagnoses       Menorrhagia with irregular cycle     Endometrial polyp     Problem List

## 2021-08-19 ENCOUNTER — MYC MEDICAL ADVICE (OUTPATIENT)
Dept: OBGYN | Facility: CLINIC | Age: 31
End: 2021-08-19

## 2021-08-20 NOTE — TELEPHONE ENCOUNTER
Routing pt HipLogiqhart message to provider to advise.  Michelle Godoy RN on 8/20/2021 at 10:23 AM

## 2021-08-21 ENCOUNTER — LAB (OUTPATIENT)
Dept: URGENT CARE | Facility: URGENT CARE | Age: 31
End: 2021-08-21
Payer: COMMERCIAL

## 2021-08-21 DIAGNOSIS — Z11.59 ENCOUNTER FOR SCREENING FOR OTHER VIRAL DISEASES: ICD-10-CM

## 2021-08-21 LAB — SARS-COV-2 RNA RESP QL NAA+PROBE: NEGATIVE

## 2021-08-21 PROCEDURE — U0003 INFECTIOUS AGENT DETECTION BY NUCLEIC ACID (DNA OR RNA); SEVERE ACUTE RESPIRATORY SYNDROME CORONAVIRUS 2 (SARS-COV-2) (CORONAVIRUS DISEASE [COVID-19]), AMPLIFIED PROBE TECHNIQUE, MAKING USE OF HIGH THROUGHPUT TECHNOLOGIES AS DESCRIBED BY CMS-2020-01-R: HCPCS

## 2021-08-21 PROCEDURE — U0005 INFEC AGEN DETEC AMPLI PROBE: HCPCS

## 2021-08-24 ENCOUNTER — ANESTHESIA EVENT (OUTPATIENT)
Dept: SURGERY | Facility: CLINIC | Age: 31
End: 2021-08-24
Payer: COMMERCIAL

## 2021-08-24 ENCOUNTER — HOSPITAL ENCOUNTER (OUTPATIENT)
Facility: CLINIC | Age: 31
Discharge: HOME | End: 2021-08-24
Attending: OBSTETRICS & GYNECOLOGY | Admitting: OBSTETRICS & GYNECOLOGY
Payer: COMMERCIAL

## 2021-08-24 ENCOUNTER — ANESTHESIA (OUTPATIENT)
Dept: SURGERY | Facility: CLINIC | Age: 31
End: 2021-08-24
Payer: COMMERCIAL

## 2021-08-24 VITALS
HEIGHT: 62 IN | BODY MASS INDEX: 28.08 KG/M2 | DIASTOLIC BLOOD PRESSURE: 63 MMHG | TEMPERATURE: 97.4 F | HEART RATE: 56 BPM | RESPIRATION RATE: 13 BRPM | WEIGHT: 152.6 LBS | OXYGEN SATURATION: 100 % | SYSTOLIC BLOOD PRESSURE: 101 MMHG

## 2021-08-24 DIAGNOSIS — N84.0 ENDOMETRIAL POLYP: ICD-10-CM

## 2021-08-24 DIAGNOSIS — N93.9 ABNORMAL UTERINE BLEEDING: Primary | ICD-10-CM

## 2021-08-24 LAB — B-HCG SERPL-ACNC: <1 IU/L (ref 0–5)

## 2021-08-24 PROCEDURE — 272N000001 HC OR GENERAL SUPPLY STERILE: Performed by: OBSTETRICS & GYNECOLOGY

## 2021-08-24 PROCEDURE — 999N000141 HC STATISTIC PRE-PROCEDURE NURSING ASSESSMENT: Performed by: OBSTETRICS & GYNECOLOGY

## 2021-08-24 PROCEDURE — 258N000003 HC RX IP 258 OP 636: Performed by: NURSE ANESTHETIST, CERTIFIED REGISTERED

## 2021-08-24 PROCEDURE — 370N000017 HC ANESTHESIA TECHNICAL FEE, PER MIN: Performed by: OBSTETRICS & GYNECOLOGY

## 2021-08-24 PROCEDURE — 250N000013 HC RX MED GY IP 250 OP 250 PS 637: Performed by: OBSTETRICS & GYNECOLOGY

## 2021-08-24 PROCEDURE — 360N000076 HC SURGERY LEVEL 3, PER MIN: Performed by: OBSTETRICS & GYNECOLOGY

## 2021-08-24 PROCEDURE — 250N000009 HC RX 250: Performed by: NURSE ANESTHETIST, CERTIFIED REGISTERED

## 2021-08-24 PROCEDURE — 250N000025 HC SEVOFLURANE, PER MIN: Performed by: OBSTETRICS & GYNECOLOGY

## 2021-08-24 PROCEDURE — 710N000012 HC RECOVERY PHASE 2, PER MINUTE: Performed by: OBSTETRICS & GYNECOLOGY

## 2021-08-24 PROCEDURE — 258N000001 HC RX 258: Performed by: OBSTETRICS & GYNECOLOGY

## 2021-08-24 PROCEDURE — 710N000009 HC RECOVERY PHASE 1, LEVEL 1, PER MIN: Performed by: OBSTETRICS & GYNECOLOGY

## 2021-08-24 PROCEDURE — 250N000011 HC RX IP 250 OP 636: Performed by: NURSE ANESTHETIST, CERTIFIED REGISTERED

## 2021-08-24 PROCEDURE — 58558 HYSTEROSCOPY BIOPSY: CPT | Performed by: OBSTETRICS & GYNECOLOGY

## 2021-08-24 PROCEDURE — 84702 CHORIONIC GONADOTROPIN TEST: CPT | Performed by: OBSTETRICS & GYNECOLOGY

## 2021-08-24 PROCEDURE — 88305 TISSUE EXAM BY PATHOLOGIST: CPT | Mod: TC | Performed by: OBSTETRICS & GYNECOLOGY

## 2021-08-24 PROCEDURE — 36415 COLL VENOUS BLD VENIPUNCTURE: CPT | Performed by: OBSTETRICS & GYNECOLOGY

## 2021-08-24 RX ORDER — PROPOFOL 10 MG/ML
INJECTION, EMULSION INTRAVENOUS CONTINUOUS PRN
Status: DISCONTINUED | OUTPATIENT
Start: 2021-08-24 | End: 2021-08-24

## 2021-08-24 RX ORDER — FENTANYL CITRATE 50 UG/ML
50 INJECTION, SOLUTION INTRAMUSCULAR; INTRAVENOUS EVERY 5 MIN PRN
Status: DISCONTINUED | OUTPATIENT
Start: 2021-08-24 | End: 2021-08-24 | Stop reason: HOSPADM

## 2021-08-24 RX ORDER — FENTANYL CITRATE 50 UG/ML
INJECTION, SOLUTION INTRAMUSCULAR; INTRAVENOUS PRN
Status: DISCONTINUED | OUTPATIENT
Start: 2021-08-24 | End: 2021-08-24

## 2021-08-24 RX ORDER — SODIUM CHLORIDE, SODIUM LACTATE, POTASSIUM CHLORIDE, CALCIUM CHLORIDE 600; 310; 30; 20 MG/100ML; MG/100ML; MG/100ML; MG/100ML
INJECTION, SOLUTION INTRAVENOUS CONTINUOUS PRN
Status: DISCONTINUED | OUTPATIENT
Start: 2021-08-24 | End: 2021-08-24

## 2021-08-24 RX ORDER — DEXAMETHASONE SODIUM PHOSPHATE 4 MG/ML
INJECTION, SOLUTION INTRA-ARTICULAR; INTRALESIONAL; INTRAMUSCULAR; INTRAVENOUS; SOFT TISSUE PRN
Status: DISCONTINUED | OUTPATIENT
Start: 2021-08-24 | End: 2021-08-24

## 2021-08-24 RX ORDER — IBUPROFEN 600 MG/1
600 TABLET, FILM COATED ORAL EVERY 6 HOURS PRN
COMMUNITY
End: 2022-09-27

## 2021-08-24 RX ORDER — HYDROCODONE BITARTRATE AND ACETAMINOPHEN 5; 325 MG/1; MG/1
1-2 TABLET ORAL EVERY 4 HOURS PRN
Qty: 10 TABLET | Refills: 0 | Status: SHIPPED | OUTPATIENT
Start: 2021-08-24 | End: 2021-08-27

## 2021-08-24 RX ORDER — IBUPROFEN 400 MG/1
800 TABLET, FILM COATED ORAL ONCE
Status: DISCONTINUED | OUTPATIENT
Start: 2021-08-24 | End: 2021-08-24 | Stop reason: HOSPADM

## 2021-08-24 RX ORDER — OXYCODONE HYDROCHLORIDE 5 MG/1
5 TABLET ORAL EVERY 4 HOURS PRN
Status: DISCONTINUED | OUTPATIENT
Start: 2021-08-24 | End: 2021-08-24 | Stop reason: HOSPADM

## 2021-08-24 RX ORDER — HYDROMORPHONE HCL IN WATER/PF 6 MG/30 ML
0.4 PATIENT CONTROLLED ANALGESIA SYRINGE INTRAVENOUS EVERY 5 MIN PRN
Status: DISCONTINUED | OUTPATIENT
Start: 2021-08-24 | End: 2021-08-24 | Stop reason: HOSPADM

## 2021-08-24 RX ORDER — PROPOFOL 10 MG/ML
INJECTION, EMULSION INTRAVENOUS PRN
Status: DISCONTINUED | OUTPATIENT
Start: 2021-08-24 | End: 2021-08-24

## 2021-08-24 RX ORDER — LIDOCAINE HYDROCHLORIDE 20 MG/ML
INJECTION, SOLUTION INFILTRATION; PERINEURAL PRN
Status: DISCONTINUED | OUTPATIENT
Start: 2021-08-24 | End: 2021-08-24

## 2021-08-24 RX ORDER — ACETAMINOPHEN 325 MG/1
975 TABLET ORAL ONCE
Status: DISCONTINUED | OUTPATIENT
Start: 2021-08-24 | End: 2021-08-24 | Stop reason: HOSPADM

## 2021-08-24 RX ORDER — ACETAMINOPHEN 325 MG/1
975 TABLET ORAL ONCE
Status: COMPLETED | OUTPATIENT
Start: 2021-08-24 | End: 2021-08-24

## 2021-08-24 RX ORDER — ONDANSETRON 2 MG/ML
INJECTION INTRAMUSCULAR; INTRAVENOUS PRN
Status: DISCONTINUED | OUTPATIENT
Start: 2021-08-24 | End: 2021-08-24

## 2021-08-24 RX ORDER — SODIUM CHLORIDE, SODIUM LACTATE, POTASSIUM CHLORIDE, CALCIUM CHLORIDE 600; 310; 30; 20 MG/100ML; MG/100ML; MG/100ML; MG/100ML
INJECTION, SOLUTION INTRAVENOUS CONTINUOUS
Status: DISCONTINUED | OUTPATIENT
Start: 2021-08-24 | End: 2021-08-24 | Stop reason: HOSPADM

## 2021-08-24 RX ORDER — ONDANSETRON 4 MG/1
4 TABLET, ORALLY DISINTEGRATING ORAL EVERY 30 MIN PRN
Status: DISCONTINUED | OUTPATIENT
Start: 2021-08-24 | End: 2021-08-24 | Stop reason: HOSPADM

## 2021-08-24 RX ORDER — KETOROLAC TROMETHAMINE 30 MG/ML
INJECTION, SOLUTION INTRAMUSCULAR; INTRAVENOUS PRN
Status: DISCONTINUED | OUTPATIENT
Start: 2021-08-24 | End: 2021-08-24

## 2021-08-24 RX ORDER — ONDANSETRON 2 MG/ML
4 INJECTION INTRAMUSCULAR; INTRAVENOUS EVERY 30 MIN PRN
Status: DISCONTINUED | OUTPATIENT
Start: 2021-08-24 | End: 2021-08-24 | Stop reason: HOSPADM

## 2021-08-24 RX ADMIN — FENTANYL CITRATE 100 MCG: 50 INJECTION, SOLUTION INTRAMUSCULAR; INTRAVENOUS at 07:31

## 2021-08-24 RX ADMIN — ONDANSETRON 4 MG: 2 INJECTION INTRAMUSCULAR; INTRAVENOUS at 07:42

## 2021-08-24 RX ADMIN — SODIUM CHLORIDE, POTASSIUM CHLORIDE, SODIUM LACTATE AND CALCIUM CHLORIDE: 600; 310; 30; 20 INJECTION, SOLUTION INTRAVENOUS at 07:29

## 2021-08-24 RX ADMIN — ACETAMINOPHEN 975 MG: 325 TABLET, FILM COATED ORAL at 06:01

## 2021-08-24 RX ADMIN — PROPOFOL 50 MCG/KG/MIN: 10 INJECTION, EMULSION INTRAVENOUS at 07:31

## 2021-08-24 RX ADMIN — MIDAZOLAM 2 MG: 1 INJECTION INTRAMUSCULAR; INTRAVENOUS at 07:31

## 2021-08-24 RX ADMIN — KETOROLAC TROMETHAMINE 30 MG: 30 INJECTION, SOLUTION INTRAMUSCULAR at 08:01

## 2021-08-24 RX ADMIN — PROPOFOL 200 MG: 10 INJECTION, EMULSION INTRAVENOUS at 07:31

## 2021-08-24 RX ADMIN — DEXAMETHASONE SODIUM PHOSPHATE 4 MG: 4 INJECTION, SOLUTION INTRA-ARTICULAR; INTRALESIONAL; INTRAMUSCULAR; INTRAVENOUS; SOFT TISSUE at 07:41

## 2021-08-24 RX ADMIN — LIDOCAINE HYDROCHLORIDE 100 MG: 20 INJECTION, SOLUTION INFILTRATION; PERINEURAL at 07:31

## 2021-08-24 ASSESSMENT — MIFFLIN-ST. JEOR: SCORE: 1355.44

## 2021-08-24 ASSESSMENT — LIFESTYLE VARIABLES: TOBACCO_USE: 0

## 2021-08-24 NOTE — ANESTHESIA PREPROCEDURE EVALUATION
Prior to Admission medications    Medication Sig Start Date End Date Taking? Authorizing Provider   ibuprofen (ADVIL/MOTRIN) 600 MG tablet Take 600 mg by mouth every 6 hours as needed for moderate pain   Yes Reported, Patient     Current Facility-Administered Medications Ordered in Epic   Medication Dose Route Frequency Last Rate Last Admin     PRE OP antibiotics NOT needed for this surgical procedure  1 each As instructed Continuous         No current [facility]-ordered outpatient medications on file.       no pre procedure antibiotic needed       No results for input(s): ABO, RH in the last 11729 hours.  Recent Labs   Lab Test 21  1034   HCG Negative     Recent Results (from the past 744 hour(s))   US Transvaginal Non OB    Narrative    US Transvaginal Non OB  Order #: [id number] Accession #: [id number]  Study Notes     Laverne Leblanc on 2021  1:52 PM      Gynecological Ultrasound Report  Pelvic U/S - Transvaginal  [facility]  Referring Provider: Rich Thorne MD  Sonographer:  Laverne Leblanc RDMS  Indication: Bleeding/Menses- Menorrhagia (heavy menses)  LMP: 2021 (Approximate) - Irregular periods  History:   Gynecological Ultrasonography:   Uterus: anteverted. Contour is smooth/regular.  Size: 8.6 x 4.8 x 4.0 cm  Endometrium: Thickness Total 11.3 mm  Findings: Probable polyp vs fibroid 1.2 x 0.6 x 1.2cm, hypoechoic area 0.8   x 0.6 x 0.7cm  Right Ovary: 3.6 x 1.7 x 1.8 cm. Wnl, paraovarian cyst 0.8 x 0.6 x 0.8cm  Left Ovary: 3.2 x 2.8 x 2.2 cm. Complex cyst 2.4 x 1.5 x 1.8 cm  Cul de Sac Free Fluid: No free fluid     Impression: Intrauterine mass, right paraovarian cyst, left complex   ovarian cyst       RACHANA DAY        Discussed in office   Anesthesia Pre-Procedure Evaluation    Patient: Tere Alves   MRN: [id number] : 1990        Preoperative Diagnosis: Endometrial polyp [N84.0]   Procedure : Procedure(s):  HYSTEROSCOPY, THERAPEUTIC with Versapoint     Past  Medical History:   Diagnosis Date     Abnormal uterine bleeding      Anemia      NO ACTIVE PROBLEMS       Past Surgical History:   Procedure Laterality Date     NO HISTORY OF SURGERY        No Known Allergies   Social History     Tobacco Use     Smoking status: Never Smoker     Smokeless tobacco: Never Used   Substance Use Topics     Alcohol use: Not Currently     Comment: couple drinks/month      Wt Readings from Last 1 Encounters:   08/24/21 69.2 kg (152 lb 9.6 oz)        Anesthesia Evaluation   Pt has had prior anesthetic.     No history of anesthetic complications       ROS/MED HX  ENT/Pulmonary:    (-) tobacco use   Neurologic:       Cardiovascular:    (-) hypertension   METS/Exercise Tolerance:     Hematologic:       Musculoskeletal:       GI/Hepatic:    (-) GERD   Renal/Genitourinary:       Endo:       Psychiatric/Substance Use:       Infectious Disease:       Malignancy:       Other:            Physical Exam    Airway        Mallampati: I    Neck ROM: full     Respiratory Devices and Support         Dental  no notable dental history         Cardiovascular   cardiovascular exam normal          Pulmonary   pulmonary exam normal                OUTSIDE LABS:  CBC:   Lab Results   Component Value Date    WBC 6.9 05/16/2018    WBC 6.1 10/18/2017    HGB 9.4 (L) 07/29/2021    HGB 10.3 (L) 05/16/2018    HCT 30.3 (L) 05/16/2018    HCT 34.4 (L) 10/18/2017     05/16/2018     10/18/2017     BMP:   Lab Results   Component Value Date     10/18/2017    POTASSIUM 4.5 10/18/2017    CHLORIDE 106 10/18/2017    CO2 25 10/18/2017    BUN 11 10/18/2017    CR 0.73 10/18/2017    GLC 87 10/18/2017     COAGS:   Lab Results   Component Value Date    INR 0.95 10/18/2017     POC:   Lab Results   Component Value Date    HCG Negative 01/27/2021    HCGS Negative 02/16/2021     HEPATIC:   Lab Results   Component Value Date    ALBUMIN 3.7 10/18/2017    PROTTOTAL 8.0 10/18/2017    ALT 18 10/18/2017    AST 18 10/18/2017     ALKPHOS 53 10/18/2017    BILITOTAL 0.8 10/18/2017     OTHER:   Lab Results   Component Value Date    DAT 8.8 10/18/2017    TSH 2.63 02/16/2021       Anesthesia Plan    ASA Status:  1   NPO Status:  NPO Appropriate    Anesthesia Type: General.     - Airway: LMA              Consents    Anesthesia Plan(s) and associated risks, benefits, and realistic alternatives discussed. Questions answered and patient/representative(s) expressed understanding.     - Discussed with:  Patient         Postoperative Care    Pain management: IV analgesics.   PONV prophylaxis: Ondansetron (or other 5HT-3)     Comments:                Jhoan Schmidt MD

## 2021-08-24 NOTE — BRIEF OP NOTE
[facility]    Brief Operative Note    Pre-operative diagnosis: Endometrial polyp [N84.0]  Post-operative diagnosis  same    Procedure: Procedure(s):  HYSTEROSCOPY, THERAPEUTIC with Versapoint, POLYPECTOMY  Surgeon: Surgeon(s) and Role:     * Rich Thorne MD - Primary  Anesthesia: General   Estimated blood loss: Minimal  Drains: None  Specimens:   ID Type Source Tests Collected by Time Destination   1 : Endometrial polyp Polyp Endometrium SURGICAL PATHOLOGY EXAM Rich Thorne MD 8/24/2021  7:56 AM      Findings: SOLITARY POLYP  Complications: None.

## 2021-08-24 NOTE — ANESTHESIA POSTPROCEDURE EVALUATION
Patient: Tere Alves    Procedure(s):  HYSTEROSCOPY, THERAPEUTIC with Versapoint    Diagnosis:Endometrial polyp [N84.0]  Diagnosis Additional Information: No value filed.    Anesthesia Type:  General    Note:  Disposition: Outpatient   Postop Pain Control: Uneventful            Sign Out: Well controlled pain   PONV: No   Neuro/Psych: Uneventful            Sign Out: Acceptable/Baseline neuro status   Airway/Respiratory: Uneventful            Sign Out: Acceptable/Baseline resp. status   CV/Hemodynamics: Uneventful            Sign Out: Acceptable CV status; No obvious hypovolemia; No obvious fluid overload   Other NRE: NONE   DID A NON-ROUTINE EVENT OCCUR? No           Last vitals:  Vitals Value Taken Time   /63 08/24/21 0915   Temp 36.3  C (97.4  F) 08/24/21 0915   Pulse 68 08/24/21 0926   Resp 9 08/24/21 0926   SpO2 100 % 08/24/21 0926   Vitals shown include unvalidated device data.    Electronically Signed By: Jhona Schmidt MD  August 24, 2021  11:23 AM

## 2021-08-24 NOTE — ANESTHESIA CARE TRANSFER NOTE
Patient: Tere Alves    Procedure(s):  HYSTEROSCOPY, THERAPEUTIC with Versapoint    Diagnosis: Endometrial polyp [N84.0]  Diagnosis Additional Information: No value filed.    Anesthesia Type:   General     Note:    Oropharynx: oral airway in place  Level of Consciousness: drowsy  Oxygen Supplementation: face mask    Independent Airway: airway patency satisfactory and stable  Dentition: dentition unchanged  Vital Signs Stable: post-procedure vital signs reviewed and stable  Report to RN Given: handoff report given  Patient transferred to: PACU  Comments: Pt exhibits spontaneous respirations, follows commands, suctioned, LMA removed, exchanging well, transferred to pacu with O2 @ 10L via mask, all monitors and alarms on, report to RN, VSS.  Handoff Report: Identifed the Patient, Identified the Reponsible Provider, Reviewed the pertinent medical history, Discussed the surgical course, Reviewed Intra-OP anesthesia mangement and issues during anesthesia, Set expectations for post-procedure period and Allowed opportunity for questions and acknowledgement of understanding      Vitals:  Vitals Value Taken Time   BP     Temp     Pulse     Resp     SpO2         Electronically Signed By: HEATHER Dove CRNA  August 24, 2021  8:19 AM

## 2021-08-24 NOTE — OP NOTE
Procedure Date: 2021    REASON FOR ADMISSION:  Menorrhagia, intrauterine filling defect.    OPERATIVE PROCEDURE:  Hysteroscopy, polypectomy.    OPERATIVE FINDINGS:  The patient had a large solitary polyp emanating from the posterior lower uterine segment.  Once removed, the remainder of the cavity was completely normal.    DESCRIPTION OF PROCEDURE:  After general anesthesia was induced, the patient was placed in the dorsal lithotomy position and prepped and draped in the usual fashion.  The cervix was grasped and endocervix carefully dilated.  The hysteroscope was placed.  We could easily identify the base of a large polyp.  The Versapoint was used in coagulating and cutting current to resect the base of the polyp.  This was grasped and removed and sent to the pathologist.  There was no sign of any bleeding.  Both tubal ostia were visualized.  The cavity was completely normal.  The procedure was terminated at this point.  She went to the recovery room and was given Toradol.  She will be discharged on Vicodin.  She is on her menses at this time, so she should expect some ongoing bleeding, but not heavily.    Rich Thorne Jr, MD        D: 2021   T: 2021   MT: ISABEL    Name:     JOVAN DUBOIS  MRN:      [id number]        Account:        [id number]   :      1990           Procedure Date: 2021     Document: [id number]

## 2021-08-24 NOTE — DISCHARGE INSTRUCTIONS
Today you were given 975 mg of Tylenol at 6 a.m. The recommended daily maximum dose is 4000 mg.     Today you received Toradol, an antiinflammatory medication similar to Ibuprofen.  You should not take other antiinflammatory medication, such as Ibuprofen, Motrin, Advil, Aleve, Naprosyn, etc until 2 p.m.     Same Day Surgery Discharge Instructions for  Sedation and General Anesthesia       It's not unusual to feel dizzy, light-headed or faint for up to 24 hours after surgery or while taking pain medication.  If you have these symptoms: sit for a few minutes before standing and have someone assist you when you get up to walk or use the bathroom.      You should rest and relax for the next 24 hours. We recommend you make arrangements to have an adult stay with you for at least 24 hours after your discharge.  Avoid hazardous and strenuous activity.      DO NOT DRIVE any vehicle or operate mechanical equipment for 24 hours following the end of your surgery.  Even though you may feel normal, your reactions may be affected by the medication you have received.      Do not drink alcoholic beverages for 24 hours following surgery.       Slowly progress to your regular diet as you feel able. It's not unusual to feel nauseated and/or vomit after receiving anesthesia.  If you develop these symptoms, drink clear liquids (apple juice, ginger ale, broth, 7-up, etc. ) until you feel better.  If your nausea and vomiting persists for 24 hours, please notify your surgeon.        All narcotic pain medications, along with inactivity and anesthesia, can cause constipation. Drinking plenty of liquids and increasing fiber intake will help.      For any questions of a medical nature, call your surgeon.      Do not make important decisions for 24 hours.      If you had general anesthesia, you may have a sore throat for a couple of days related to the breathing tube used during surgery.  You may use Cepacol lozenges to help with this  discomfort.  If it worsens or if you develop a fever, contact your surgeon.       If you feel your pain is not well managed with the pain medications prescribed by your surgeon, please contact your surgeon's office to let them know so they can address your concerns.       CoVid 19 Information    We want to give you information regarding Covid. Please consult your primary care provider with any questions you might have.     Patient who have symptoms (cough, fever, or shortness of breath), need to isolate for 7 days from when symptoms started OR 72 hours after fever resolves (without fever reducing medications) AND improvement of respiratory symptoms (whichever is longer).      Isolate yourself at home (in own room/own bathroom if possible)    Do Not allow any visitors    Do Not go to work or school    Do Not go to [employer],  centers, shopping, or other public places.    Do Not shake hands.    Avoid close and intimate contact with others (hugging, kissing).    Follow CDC recommendations for household cleaning of frequently touched services.     After the initial 7 days, continue to isolate yourself from household members as much as possible. To continue decrease the risk of community spread and exposure, you and any members of your household should limit activities in public for 14 days after starting home isolation.     You can reference the following CDC link for helpful home isolation/care tips:  https://www.cdc.gov/coronavirus/2019-ncov/downloads/10Things.pdf    Protect Others:    Cover Your Mouth and Nose with a mask, disposable tissue or wash cloth to avoid spreading germs to others.    Wash your hands and face frequently with soap and water    Call Your Primary Doctor If: Breathing difficulty develops or you become worse.    For more information about COVID19 and options for caring for yourself at home, please visit the CDC website at  https://www.cdc.gov/coronavirus/2019-ncov/about/steps-when-sick.html  For more options for care at [facility], please visit our website at [email or web address]    [facility]  Discharge Instructions  Following  Hysteroscopy    Activity  You may resume normal activities including lifting as needed.  It is permissible to climb stairs. You may drive after 24 hours as long as you are not taking narcotic pain pills.  Baths or showers are perfectly acceptable.      Vaginal Discharge  You may have some vaginal bleeding or discharge for about a week after procedure.  You may use tampons or pads.    Temperature  If you develop temperature elevations to over 101  Fahrenheit, your physician should be called immediately.    Diet  [place] or light diet is advisable the day of surgery.  If nausea persists, continue this diet.  If severe, call.    Follow-up  Make an appointment in 1-2 weeks if instructed to at: [phone]        [facility]  [phone]

## 2021-08-25 LAB
PATH REPORT.COMMENTS IMP SPEC: NORMAL
PATH REPORT.COMMENTS IMP SPEC: NORMAL
PATH REPORT.FINAL DX SPEC: NORMAL
PATH REPORT.GROSS SPEC: NORMAL
PATH REPORT.MICROSCOPIC SPEC OTHER STN: NORMAL
PATH REPORT.RELEVANT HX SPEC: NORMAL
PHOTO IMAGE: NORMAL

## 2021-08-25 PROCEDURE — 88305 TISSUE EXAM BY PATHOLOGIST: CPT | Mod: 26 | Performed by: PATHOLOGY

## 2021-10-09 ENCOUNTER — HEALTH MAINTENANCE LETTER (OUTPATIENT)
Age: 31
End: 2021-10-09

## 2022-05-21 ENCOUNTER — HEALTH MAINTENANCE LETTER (OUTPATIENT)
Age: 32
End: 2022-05-21

## 2022-09-17 ENCOUNTER — HEALTH MAINTENANCE LETTER (OUTPATIENT)
Age: 32
End: 2022-09-17

## 2022-09-27 ENCOUNTER — OFFICE VISIT (OUTPATIENT)
Dept: OBGYN | Facility: CLINIC | Age: 32
End: 2022-09-27
Payer: COMMERCIAL

## 2022-09-27 VITALS
HEART RATE: 64 BPM | WEIGHT: 158 LBS | BODY MASS INDEX: 29.83 KG/M2 | SYSTOLIC BLOOD PRESSURE: 110 MMHG | DIASTOLIC BLOOD PRESSURE: 68 MMHG | HEIGHT: 61 IN

## 2022-09-27 DIAGNOSIS — N97.9 FEMALE INFERTILITY: Primary | ICD-10-CM

## 2022-09-27 DIAGNOSIS — D50.0 IRON DEFICIENCY ANEMIA DUE TO CHRONIC BLOOD LOSS: ICD-10-CM

## 2022-09-27 DIAGNOSIS — R73.03 PREDIABETES: ICD-10-CM

## 2022-09-27 LAB
HBA1C MFR BLD: 5.7 % (ref 0–5.6)
HGB BLD-MCNC: 12.2 G/DL (ref 11.7–15.7)
MIS SERPL-MCNC: 2.05 NG/ML (ref 0.58–8.1)
PROGEST SERPL-MCNC: 0.1 NG/ML
TSH SERPL DL<=0.005 MIU/L-ACNC: 3.05 MU/L (ref 0.4–4)

## 2022-09-27 PROCEDURE — 83520 IMMUNOASSAY QUANT NOS NONAB: CPT | Performed by: OBSTETRICS & GYNECOLOGY

## 2022-09-27 PROCEDURE — 83036 HEMOGLOBIN GLYCOSYLATED A1C: CPT | Performed by: OBSTETRICS & GYNECOLOGY

## 2022-09-27 PROCEDURE — 84144 ASSAY OF PROGESTERONE: CPT | Performed by: OBSTETRICS & GYNECOLOGY

## 2022-09-27 PROCEDURE — 99214 OFFICE O/P EST MOD 30 MIN: CPT | Performed by: OBSTETRICS & GYNECOLOGY

## 2022-09-27 PROCEDURE — 84443 ASSAY THYROID STIM HORMONE: CPT | Performed by: OBSTETRICS & GYNECOLOGY

## 2022-09-27 PROCEDURE — 85018 HEMOGLOBIN: CPT | Performed by: OBSTETRICS & GYNECOLOGY

## 2022-09-27 PROCEDURE — 36415 COLL VENOUS BLD VENIPUNCTURE: CPT | Performed by: OBSTETRICS & GYNECOLOGY

## 2022-09-27 RX ORDER — CLOMIPHENE CITRATE 50 MG/1
50 TABLET ORAL DAILY
Qty: 5 TABLET | Refills: 2 | Status: ON HOLD | OUTPATIENT
Start: 2022-09-27 | End: 2024-02-26

## 2022-09-27 RX ORDER — METFORMIN HCL 500 MG
500 TABLET, EXTENDED RELEASE 24 HR ORAL
Qty: 90 TABLET | Refills: 3 | Status: SHIPPED | OUTPATIENT
Start: 2022-09-27 | End: 2024-01-11

## 2022-09-27 NOTE — PROGRESS NOTES
SUBJECTIVE:                                                   Tere Alves is a 32 year old female who presents to clinic today for the following health issue(s):  Patient presents with:  Fertility      Additional information: Infertility for over 6 years    HPI:  Tere presents for ongoing difficulty with conceiving. She had an endometrial polyp removed by Dr. Thorne in 2021. She had irregular menses prior to that with menses every 6 months. Now her cycles re 35 to 45 days long. She has no prior diagnosis of PCOS or Type 2 DM or thyroid disease. She has been trying to conceive with her  who completed a semen analysis 4 months ago with his PCP and was reported as normal. He has never fathered any children. She has never had an HSG or any history of pelvic infections. She has never used an ovulation predictor kit. She has a sister who was able to conceive with Clomid and she is hoping to do the same.      Patient's last menstrual period was 2022..     Patient is sexually active, .  Using none for contraception.    reports that she has never smoked. She has never used smokeless tobacco.    STD testing offered?  Declined    Health maintenance updated:  yes    Today's PHQ-2 Score:   PHQ-2 (  [employer]) 2021   Q1: Little interest or pleasure in doing things 0   Q2: Feeling down, depressed or hopeless 0   PHQ-2 Score 0   PHQ-2 Total Score (12-17 Years)- Positive if 3 or more points; Administer PHQ-A if positive 0     Today's PHQ-9 Score: No flowsheet data found.  Today's CHERELLE-7 Score: No flowsheet data found.    Problem list and histories reviewed & adjusted, as indicated.  Additional history: as documented.    Patient Active Problem List   Diagnosis     Abnormal uterine bleeding     Endometrial polyp     Past Surgical History:   Procedure Laterality Date     NO HISTORY OF SURGERY       OPERATIVE HYSTEROSCOPY N/A 2021    Procedure: HYSTEROSCOPY, THERAPEUTIC with Versapoint;   "Surgeon: Rich Thorne MD;  Location:  OR      Social History     Tobacco Use     Smoking status: Never Smoker     Smokeless tobacco: Never Used   Substance Use Topics     Alcohol use: Not Currently     Comment: couple drinks/month         Negative family history of: Diabetes, Myocardial Infarction, Cerebrovascular Disease, Coronary Artery Disease Early Onset, Breast Cancer, Ovarian Cancer, Colon Cancer            Current Outpatient Medications   Medication Sig     clomiPHENE (CLOMID) 50 MG tablet Take 1 tablet (50 mg) by mouth daily Start on day 3 of your cycle     ibuprofen (ADVIL/MOTRIN) 600 MG tablet Take 600 mg by mouth every 6 hours as needed for moderate pain     No current facility-administered medications for this visit.     No Known Allergies    ROS:  12 point review of systems negative other than symptoms noted below or in the HPI.  No urinary frequency or dysuria, bladder or kidney problems      OBJECTIVE:     /68   Pulse 64   Ht 1.549 m (5' 1\")   Wt 71.7 kg (158 lb)   LMP 08/26/2022   BMI 29.85 kg/m    Body mass index is 29.85 kg/m .    Exam:  Constitutional:  Appearance: Well nourished, well developed alert, in no acute distress  Skin: General Inspection:  No rashes present, no lesions present, no areas of discoloration.  Neurologic:  Mental Status:  Oriented X3.  Normal strength and tone, sensory exam grossly normal, mentation intact and speech normal.    Psychiatric:  Mentation appears normal and affect normal/bright.         ASSESSMENT/PLAN:                                                        ICD-10-CM    1. Female infertility  N97.9 clomiPHENE (CLOMID) 50 MG tablet     XR Hysterosalpingogram Radiologist Performs Cath     US Transvaginal Pelvic Non-OB     TSH with free T4 reflex     Hemoglobin A1c     Anti-Mullerian hormone     Progesterone     TSH with free T4 reflex     Hemoglobin A1c     Anti-Mullerian hormone     Progesterone   2. Iron deficiency anemia due to chronic blood " loss  D50.0 Hemoglobin     Hemoglobin     I recommend completing her work up with a hysterosalpingogram and to also repeat her ultrasound as her cycles appear to be anovulatory. I recommend adding on a TSH and Hemoglobin A1C to rule out thyroid disease and diabetes. Given her cycle length will send a progesterone level to check for ovulation based on the timing of her LMP.  She is welcome to try Clomid and timed intercourse at home but I would limit it to no more than 3 cycles. Given the length of infertility I recommend proceeding to intrauterine insemination but she is not seeking that intervention at this time. To use Clomid I recommend the following:  Take if from days 3-7 of the cycle  Start using OPKs from day 12 of the cycle looking for two dark lines  Target intercourse to the day the surge is detected and also to the following two days.    Schedule an HSG within the first 10 days of the next cycle. Ok to be on Clomid at the same time.      Thelma Oliveira MD  [facility] [place]

## 2022-09-27 NOTE — RESULT ENCOUNTER NOTE
Jair Carranza  I sent you a prescription for metformin because your screen for diabetes puts you in the prediabetic range. This can interfere with normal egg release. Other ways to improve this is to avoid added sugar in your diet: avoid pop, candy. Limit fruit to no more than 2 servings per day. Regular exercise with 150 minutes per week of cardiovascular activity. Great news is that your anemia is cured.  Best  Dr. Oliveira

## 2022-09-28 NOTE — RESULT ENCOUNTER NOTE
The labs show that you have a normal amount of eggs but that you likely did not ovulate in this last cycle.  Best  Dr. Oliveira

## 2023-06-04 ENCOUNTER — HEALTH MAINTENANCE LETTER (OUTPATIENT)
Age: 33
End: 2023-06-04

## 2023-08-07 ENCOUNTER — VIRTUAL VISIT (OUTPATIENT)
Dept: OBGYN | Facility: CLINIC | Age: 33
End: 2023-08-07
Payer: COMMERCIAL

## 2023-08-07 DIAGNOSIS — Z34.01 ENCOUNTER FOR SUPERVISION OF NORMAL FIRST PREGNANCY IN FIRST TRIMESTER: Primary | ICD-10-CM

## 2023-08-07 PROCEDURE — 99207 PR NO CHARGE NURSE ONLY: CPT

## 2023-08-07 NOTE — PROGRESS NOTES
Telephone visit with patient for New Prenatal Intake and Education. This is patient's first pregnancy. Handouts reviewed and will be provided at next prenatal appointment. Scheduled for New Prenatal with Dr. Richardson on 8/30/23.       Prenatal OB Questionnaire  Patient supplied answers from flow sheet for:  Prenatal OB Questionnaire.  Past Medical History  Have you ever recieved care for your mental health? : No  Have you ever been in a major accident or suffered serious trauma?: No  Within the last year, has anyone hit, slapped, kicked or otherwise hurt you?: No  In the last year, has anyone forced you to have sex when you didn't want to?: No    Past Medical History 2   Have you ever received a blood transfusion?: No  Would you accept a blood transfusion if was medically recommended?: Yes  Does anyone in your home smoke?: No   Is your blood type Rh negative?: No  Have you ever ?: No  Have you been hospitalized for a nonsurgical reason excluding normal delivery?: No  Have you ever had an abnormal pap smear?: No    Past Medical History (Continued)  Do you have a history of abnormalities of the uterus?: No  Did your mother take SEVERO or any other hormones when she was pregnant with you?: No  Do you have any other problems we have not asked about which you feel may be important to this pregnancy?: No        Allergies as of 8/7/2023:    Allergies as of 08/07/2023    (No Known Allergies)       Current medications are:  Current Outpatient Medications   Medication Sig Dispense Refill    clomiPHENE (CLOMID) 50 MG tablet Take 1 tablet (50 mg) by mouth daily Start on day 3 of your cycle 5 tablet 2    metFORMIN (GLUCOPHAGE XR) 500 MG 24 hr tablet Take 1 tablet (500 mg) by mouth daily (with dinner) 90 tablet 3         Early ultrasound screening tool:    Does patient have irregular periods?  No  Did patient use hormonal birth control in the three months prior to positive urine pregnancy test? No  Is the patient  breastfeeding?  No  Is the patient 10 weeks or greater at time of education visit?  No    Dating US ordered per new standing orders.  Advised to schedule this sometime before she sees Dr. Richardson on 8/30.    Cecilia Grossman RN

## 2023-08-17 ENCOUNTER — ANCILLARY PROCEDURE (OUTPATIENT)
Dept: ULTRASOUND IMAGING | Facility: CLINIC | Age: 33
End: 2023-08-17
Attending: OBSTETRICS & GYNECOLOGY
Payer: COMMERCIAL

## 2023-08-17 DIAGNOSIS — Z34.01 ENCOUNTER FOR SUPERVISION OF NORMAL FIRST PREGNANCY IN FIRST TRIMESTER: ICD-10-CM

## 2023-08-17 PROCEDURE — 76801 OB US < 14 WKS SINGLE FETUS: CPT

## 2023-08-24 NOTE — PATIENT INSTRUCTIONS
If you have any questions regarding your visit, Please contact your care team.     [employer] [facility]: [phone]    To Schedule an Appointment 24/7  Call: [phone]     Gómez Richardson MD  Medical Director    Sebastian Brooks-BRENT Dietz-Surgery Scheduler  Bonny-Surgery Scheduler               Tuesday-[place]  7:30 a.m-4:30 p.m    Thursday-[place]  7:30 a.m-4:30 p.m    Typical Surgery Days: Tuesday or Friday [facility] [place]  [address] [place] [address]  PH: [phone]     Imaging Scheduling all locations  PH: [phone]     [facility]  [address] [place] [address] [place] [address]  PH: [phone]    [facility]  [address] [place] [address]  PH: [phone] Fax      **Surgeries** Our Surgery Schedulers will contact you to schedule. If you do not receive a call within 3 business days, please call [phone].    Urgent Care locations:  [facility] Monday-Friday  10 am - 8 pm  Saturday and Sunday   9 am - 5 pm  Monday-Friday   10 am - 8 pm  Saturday and Sunday   9 am - 5 pm   [phone]   If you need a medication refill, please contact your pharmacy. Please allow 3 business days for your refill to be completed.  As always, Thank you for trusting us with your healthcare needs!    see additional instructions from your care team below

## 2023-08-29 LAB
ABO/RH(D): NORMAL
ANTIBODY SCREEN: NEGATIVE
SPECIMEN EXPIRATION DATE: NORMAL

## 2023-08-30 ENCOUNTER — TRANSCRIBE ORDERS (OUTPATIENT)
Dept: MATERNAL FETAL MEDICINE | Facility: CLINIC | Age: 33
End: 2023-08-30

## 2023-08-30 ENCOUNTER — PRENATAL OFFICE VISIT (OUTPATIENT)
Dept: OBGYN | Facility: CLINIC | Age: 33
End: 2023-08-30
Attending: OBSTETRICS & GYNECOLOGY
Payer: COMMERCIAL

## 2023-08-30 VITALS
HEIGHT: 61 IN | DIASTOLIC BLOOD PRESSURE: 81 MMHG | BODY MASS INDEX: 30.41 KG/M2 | WEIGHT: 161.1 LBS | SYSTOLIC BLOOD PRESSURE: 128 MMHG

## 2023-08-30 DIAGNOSIS — Z11.3 SCREEN FOR STD (SEXUALLY TRANSMITTED DISEASE): Primary | ICD-10-CM

## 2023-08-30 DIAGNOSIS — O26.90 PREGNANCY RELATED CONDITION, ANTEPARTUM: Primary | ICD-10-CM

## 2023-08-30 DIAGNOSIS — O09.529 ANTEPARTUM MULTIGRAVIDA OF ADVANCED MATERNAL AGE: ICD-10-CM

## 2023-08-30 DIAGNOSIS — Z34.01 ENCOUNTER FOR SUPERVISION OF NORMAL FIRST PREGNANCY IN FIRST TRIMESTER: ICD-10-CM

## 2023-08-30 DIAGNOSIS — Z34.00 SUPERVISION OF NORMAL FIRST PREGNANCY, ANTEPARTUM: ICD-10-CM

## 2023-08-30 LAB
ALBUMIN UR-MCNC: NEGATIVE MG/DL
APPEARANCE UR: CLEAR
BILIRUB UR QL STRIP: NEGATIVE
COLOR UR AUTO: NORMAL
ERYTHROCYTE [DISTWIDTH] IN BLOOD BY AUTOMATED COUNT: 15.2 % (ref 10–15)
GLUCOSE UR STRIP-MCNC: NEGATIVE MG/DL
HBV SURFACE AG SERPL QL IA: NONREACTIVE
HCT VFR BLD AUTO: 37.1 % (ref 35–47)
HCV AB SERPL QL IA: NONREACTIVE
HGB BLD-MCNC: 12.5 G/DL (ref 11.7–15.7)
HGB UR QL STRIP: NEGATIVE
HIV 1+2 AB+HIV1 P24 AG SERPL QL IA: NONREACTIVE
KETONES UR STRIP-MCNC: NEGATIVE MG/DL
LEUKOCYTE ESTERASE UR QL STRIP: NEGATIVE
MCH RBC QN AUTO: 24.7 PG (ref 26.5–33)
MCHC RBC AUTO-ENTMCNC: 33.7 G/DL (ref 31.5–36.5)
MCV RBC AUTO: 73 FL (ref 78–100)
NITRATE UR QL: NEGATIVE
PH UR STRIP: 6.5 [PH] (ref 5–7)
PLATELET # BLD AUTO: 256 10E3/UL (ref 150–450)
RBC # BLD AUTO: 5.06 10E6/UL (ref 3.8–5.2)
RUBV IGG SERPL QL IA: 6.35 INDEX
RUBV IGG SERPL QL IA: POSITIVE
SP GR UR STRIP: 1.01 (ref 1–1.03)
T PALLIDUM AB SER QL: NONREACTIVE
UROBILINOGEN UR STRIP-MCNC: NORMAL MG/DL
WBC # BLD AUTO: 7.9 10E3/UL (ref 4–11)

## 2023-08-30 PROCEDURE — 81003 URINALYSIS AUTO W/O SCOPE: CPT | Performed by: OBSTETRICS & GYNECOLOGY

## 2023-08-30 PROCEDURE — 85027 COMPLETE CBC AUTOMATED: CPT | Performed by: OBSTETRICS & GYNECOLOGY

## 2023-08-30 PROCEDURE — 87591 N.GONORRHOEAE DNA AMP PROB: CPT | Performed by: OBSTETRICS & GYNECOLOGY

## 2023-08-30 PROCEDURE — 99213 OFFICE O/P EST LOW 20 MIN: CPT | Performed by: OBSTETRICS & GYNECOLOGY

## 2023-08-30 PROCEDURE — 86900 BLOOD TYPING SEROLOGIC ABO: CPT | Performed by: OBSTETRICS & GYNECOLOGY

## 2023-08-30 PROCEDURE — 86850 RBC ANTIBODY SCREEN: CPT | Performed by: OBSTETRICS & GYNECOLOGY

## 2023-08-30 PROCEDURE — 86901 BLOOD TYPING SEROLOGIC RH(D): CPT | Performed by: OBSTETRICS & GYNECOLOGY

## 2023-08-30 PROCEDURE — 87389 HIV-1 AG W/HIV-1&-2 AB AG IA: CPT | Performed by: OBSTETRICS & GYNECOLOGY

## 2023-08-30 PROCEDURE — 36415 COLL VENOUS BLD VENIPUNCTURE: CPT | Performed by: OBSTETRICS & GYNECOLOGY

## 2023-08-30 PROCEDURE — 87491 CHLMYD TRACH DNA AMP PROBE: CPT | Performed by: OBSTETRICS & GYNECOLOGY

## 2023-08-30 PROCEDURE — 86780 TREPONEMA PALLIDUM: CPT | Performed by: OBSTETRICS & GYNECOLOGY

## 2023-08-30 PROCEDURE — 86762 RUBELLA ANTIBODY: CPT | Performed by: OBSTETRICS & GYNECOLOGY

## 2023-08-30 PROCEDURE — 87086 URINE CULTURE/COLONY COUNT: CPT | Performed by: OBSTETRICS & GYNECOLOGY

## 2023-08-30 PROCEDURE — 87340 HEPATITIS B SURFACE AG IA: CPT | Performed by: OBSTETRICS & GYNECOLOGY

## 2023-08-30 PROCEDURE — 86803 HEPATITIS C AB TEST: CPT | Performed by: OBSTETRICS & GYNECOLOGY

## 2023-08-30 NOTE — PROGRESS NOTES
"Tere is a 33 year old   10w3d  weeks here for new ob visit.      See Ob questionnaire for pertinent components of HPI.  Current Issues include: nausea, mild     OB History    Para Term  AB Living   1 0 0 0 0 0   SAB IAB Ectopic Multiple Live Births   0 0 0 0 0      # Outcome Date GA Lbr Yung/2nd Weight Sex Delivery Anes PTL Lv   1 Current              Past Medical History:   Diagnosis Date    Abnormal uterine bleeding     Anemia     NO ACTIVE PROBLEMS      Past Surgical History:   Procedure Laterality Date    NO HISTORY OF SURGERY      OPERATIVE HYSTEROSCOPY N/A 2021    Procedure: HYSTEROSCOPY, THERAPEUTIC with Versapoint;  Surgeon: Rich Thorne MD;  Location: [facility] OR     Patient Active Problem List    Diagnosis Date Noted    Supervision of normal first pregnancy, antepartum 2023     Priority: Medium    Endometrial polyp 2021     Priority: Medium     Added automatically from request for surgery [id number]      Abnormal uterine bleeding 2018     Priority: Medium      No Known Allergies  Current Outpatient Medications   Medication Sig Dispense Refill    Prenatal Vit-Fe Fumarate-FA (PRENATAL VITAMIN PO)       clomiPHENE (CLOMID) 50 MG tablet Take 1 tablet (50 mg) by mouth daily Start on day 3 of your cycle (Patient not taking: Reported on 2023) 5 tablet 2    metFORMIN (GLUCOPHAGE XR) 500 MG 24 hr tablet Take 1 tablet (500 mg) by mouth daily (with dinner) (Patient not taking: Reported on 2023) 90 tablet 3       Past Medical History of Father of Baby: none  No significant medical history    Physical Exam: /81   Ht 1.56 m (5' 1.42\")   Wt 73.1 kg (161 lb 1.6 oz)   LMP 2023   BMI 30.03 kg/m    General: Well developed, well nourished female  Skin: Normal  HEENT: Normal  Neck: Supple,no adenopathy,thyroid normal  Chest: Clear to percussion, ausculation  Heart: Regular rate, rhythm,No murmur, rub, gallop  Breasts: deferred    Abdomen: Benign,Soft, flat, " non-tender,No masses, organomegaly,No inguinal nodes,Bowel sounds normoactive   Extremities: Normal  Neurological: Normal   Perineum: Intact   Vulva: Normal  Vagina: Normal mucosa, no discharge  Cervix: Nulliparous, closed, mobile,  no discharge  Uterus: 10 weeks, Normal shape, position and consistency   Adnexa: Normal  Rectum: deferred,  Bony Pelvis: Adequate       A/P 33 year old  at  10w3d weeks  (Z11.3) Screen for STD (sexually transmitted disease)  (primary encounter diagnosis)  Comment:   Plan: Chlamydia & Gonorrhea by PCR, [facility] -         Clinic Collect            (Z34.00) Supervision of normal first pregnancy, antepartum  Comment:   Plan:     (Z34.01) Encounter for supervision of normal first pregnancy in first trimester  Comment:   Plan:     (O09.529) Antepartum multigravida of advanced maternal age  Comment:   Plan: Mat Fetal Med Ctr Referral - Pregnancy              - Discussed physician coverage, tertiary support, diet, exercise, weight gain, schedule of visits, routine and indicated ultrasounds, and childbirth education.    Options for  testing for chromosomal and birth defects were discussed with the patient.  Diagnostic tests include CVS and Amniocentesis.  We discussed that these tests are definitive but invasive and do carry a risk of fetal loss.   Screening tests include nuchal translucency/blood marker testing in the first trimester and quad screening in the second trimester.  We discussed that these are screening tests and not diagnostic tests and that false positives and negatives are a distinct possibility.  The patient wants testing, referral sent.  - Prenatal labs,  GC, Chlamydia   - Pap was not done    - Prenatal Vitamins    Gómez Richardson MD FACOG

## 2023-08-31 LAB
C TRACH DNA SPEC QL PROBE+SIG AMP: NEGATIVE
N GONORRHOEA DNA SPEC QL NAA+PROBE: NEGATIVE

## 2023-09-01 LAB — BACTERIA UR CULT: NORMAL

## 2023-09-05 ENCOUNTER — PRE VISIT (OUTPATIENT)
Dept: MATERNAL FETAL MEDICINE | Facility: CLINIC | Age: 33
End: 2023-09-05
Payer: COMMERCIAL

## 2023-09-11 ENCOUNTER — OFFICE VISIT (OUTPATIENT)
Dept: MATERNAL FETAL MEDICINE | Facility: CLINIC | Age: 33
End: 2023-09-11
Attending: OBSTETRICS & GYNECOLOGY
Payer: COMMERCIAL

## 2023-09-11 ENCOUNTER — HOSPITAL ENCOUNTER (OUTPATIENT)
Dept: ULTRASOUND IMAGING | Facility: CLINIC | Age: 33
Discharge: HOME | End: 2023-09-11
Attending: OBSTETRICS & GYNECOLOGY
Payer: COMMERCIAL

## 2023-09-11 ENCOUNTER — LAB (OUTPATIENT)
Dept: LAB | Facility: CLINIC | Age: 33
End: 2023-09-11
Attending: OBSTETRICS & GYNECOLOGY
Payer: COMMERCIAL

## 2023-09-11 DIAGNOSIS — O26.90 PREGNANCY RELATED CONDITION: Primary | ICD-10-CM

## 2023-09-11 DIAGNOSIS — O26.90 PREGNANCY RELATED CONDITION, ANTEPARTUM: ICD-10-CM

## 2023-09-11 DIAGNOSIS — O09.521 MULTIGRAVIDA OF ADVANCED MATERNAL AGE IN FIRST TRIMESTER: ICD-10-CM

## 2023-09-11 DIAGNOSIS — O09.511 AMA (ADVANCED MATERNAL AGE) PRIMIGRAVIDA 35+, FIRST TRIMESTER: Primary | ICD-10-CM

## 2023-09-11 DIAGNOSIS — O09.511 AMA (ADVANCED MATERNAL AGE) PRIMIGRAVIDA 35+, FIRST TRIMESTER: ICD-10-CM

## 2023-09-11 PROCEDURE — 999N000069 HC STATISTIC GENETIC COUNSELING, < 16 MIN

## 2023-09-11 PROCEDURE — 76813 OB US NUCHAL MEAS 1 GEST: CPT

## 2023-09-11 PROCEDURE — 76813 OB US NUCHAL MEAS 1 GEST: CPT | Mod: 26 | Performed by: OBSTETRICS & GYNECOLOGY

## 2023-09-11 PROCEDURE — 36415 COLL VENOUS BLD VENIPUNCTURE: CPT

## 2023-09-11 NOTE — PROGRESS NOTES
The patient was seen for an ultrasound in the Maternal-Fetal Medicine Center at the [facility] today.  For a detailed report of the ultrasound examination, please see the ultrasound report which can be found under the imaging tab.    If you have questions regarding today's evaluation or if we can be of further service, please contact the Maternal-Fetal Medicine Center.    Bonnie Reynolds MD  , OB/GYN  Maternal-Fetal Medicine  [phone] (Pager)

## 2023-09-11 NOTE — PROGRESS NOTES
[facility] Maternal Fetal Medicine Center  Genetic Counseling Consult    Patient:  Tere Alves YOB: 1990   Date of Service:  23   MRN: [id number]    Tere was seen at the [facility] for genetic consultation. The indication for genetic counseling is advanced maternal age and desire to discuss options for genetic screening and diagnostics. The patient was accompanied to this visit by their , Jeffrey.    The session was conducted in English.      IMPRESSION/ PLAN   1. Tere has not had genetic screening in this pregnancy but elected to have screening today.     2. During today's MFM visit, Tere had a blood draw for NIPS through [facility]. The NIPS screens for trisomy 21, 18, and 13 and the patient opted to screen for sex chromosome aneuploidies, including reported fetal sex. Results are expected in 7-10 days. The patient will be called with results and if they do not answer they requested a vague message with callback information. Patient was informed that results, including fetal sex, will be available in [employer].    3. Tere had a nuchal translucency ultrasound today. Please see the ultrasound report for further details.    4. Further recommendations include a level II ultrasound with MFM and maternal serum AFP only screening for neural tube defects, if desired (quad screen should NOT be performed). The level II ultrasound has been scheduled for 10/23/2023.     PREGNANCY HISTORY   /Parity:       CURRENT PREGNANCY   Current Age: 33 year old   Age at Delivery: 34 year old  DELIO: 3/24/2024, by Ultrasound                                   Gestational Age: 12w1d  This pregnancy is a single gestation.   This pregnancy was conceived with the help of medications.    MEDICAL HISTORY   Tere s reported medical history is not expected to impact pregnancy management or risks to fetal development.       FAMILY HISTORY   A  "three-generation pedigree was obtained today and is scanned under the \"Media\" tab in Epic. The family history was reported by Tere and their partner.    The following significant findings were reported today:   Tere's niece was born premature. She had a \"stomach blockage\" that required surgery. She is now 6 months of age and doing well.  Jeffrey's mother was diagnosed with ovarian cancer at age 50 and passed away at age 52. Jeffrye's maternal aunt passed away from cancer at age 38; Jeffrey was not sure what type of cancer this was. Jeffrey also reports that his paternal aunt had ovarian cancer and  at age 51, as well as his paternal grandmother at age 55 and passed at age 60.  We discussed the family history of ovarian cancer briefly. Cancer most often occurs by chance, however some families seem to develop cancer more frequently than expected. Everyone has a risk to develop cancer, but individuals may be at an increased risk to develop cancer based on their family history. We discussed that ovarian cancer is rare and can be associated with inherited cancer predisposition syndromes. Genetic counseling is available for cancer syndromes. Cancer family history, even without genetic testing, can change cancer screening recommendations for family members and aid in insurance coverage for access to them as well. The most informative individuals to complete cancer genetic counseling and genetic testing are those with a personal history of cancer or those closely related to the affected individuals. This may be Jeffrey himself, or his maternal aunt's children.   If the family wants more information they can contact the [facility] Cancer Risk Management Program ([phone]).   Jeffrey expressed that he receives his care at the [facility] system. I informed him that he can access cancer genetic counselors through [facility].   Additionally, we discussed the importance of sharing this information " with his primary care providers, which he reports he is doing.    Otherwise, the reported family history is unremarkable for multiple miscarriages, stillbirths, birth defects, intellectual disabilities, known genetic conditions, and consanguinity.    RISK ASSESSMENT FOR CHROMOSOME CONDITIONS   We explained that the risk for fetal chromosome abnormalities increases with maternal age. We discussed specific features of common chromosome abnormalities, including Down syndrome, trisomy 13, trisomy 18, and sex chromosome trisomies.    At age 34 at midtrimester, the risk to have a baby with Down syndrome is 1 in 274.   At age 34 at midtrimester, the risk to have a baby with any chromosome abnormality is 1 in 135.     Tere has not had genetic screening in this pregnancy but elected to have screening today.      GENETIC TESTING OPTIONS FOR CHROMOSOMAL CONDITIONS   Genetic testing during a pregnancy includes screening and diagnostic procedures.      Screening tests are non-invasive which means no risk to the pregnancy and includes ultrasounds and blood work. The benefits and limitations of screening were reviewed. Screening tests provide a risk assessment (chance) specific to the pregnancy for certain fetal chromosome abnormalities but cannot definitively diagnose or exclude a fetal chromosome abnormality. Follow-up genetic counseling and consideration of diagnostic testing is recommended with any abnormal screening result. Diagnostic testing during a pregnancy is more certain and can test for more conditions. However, the tests do have a risk of miscarriage that requires careful consideration. These tests can detect fetal chromosome abnormalities with greater than 99% certainty. Results can be compromised by maternal cell contamination or mosaicism and are limited by the resolution of current genetic testing technology.     There is no screening or diagnostic test that detects all forms of birth defects or intellectual  disability.     We discussed the following screening options:   Non-invasive prenatal testing (NIPT)  Also called cell-free DNA screening because it detects chromosomes from the placenta in the pregnant person's blood  Can be done any time after 10 weeks gestation  Screens for trisomy 21, trisomy 18, trisomy 13, and sex chromosome aneuploidies  Cannot screen for open neural tube defects, maternal serum AFP after 15 weeks is recommended    We discussed the following ultrasound options:  Nuchal translucency (NT) ultrasound  Ultrasound between [id number] that includes nuchal translucency measurement and nasal bone assessments  Nuchal translucency refers to the space at the back of the neck where fluid builds up. All babies at this stage have fluid and there is only concern if there is too much fluid  Nasal bone refers to the small bone in the nose. There is concern for conditions like Down syndrome if the bone cannot be seen at all  This ultrasound can be done as part of first trimester screening, at the same time as another screen (NIPT), at the same time as a CVS, or if the patients does not want genetic screening.  Markers on ultrasound detects about 70% of pregnancies with aneuploidy  Abnormalities on NT ultrasound can also increase the risk for a birth defect, like a heart defect  Comprehensive level II ultrasound (Fetal Anatomy Ultrasound)  Ultrasound done between 18-20 weeks gestation  Screens for major birth defects and markers for aneuploidy (like trisomy 21 and trisomy 18)  Includes looking at the fetus/baby's growth, heart, organs (stomach, kidneys), placenta, and amniotic fluid    We discussed the following diagnostic options:   Chorionic villus sampling (CVS)  Invasive diagnostic procedure done between 10w0d and 13w6d  The procedure collects a small sample from the placenta for the purpose of chromosomal testing and/or other genetic testing  Diagnostic result; more than 99% sensitivity for fetal  chromosome abnormalities  Cannot screen for open neural tube defects, maternal serum AFP after 15 weeks is recommended  Amniocentesis  Invasive diagnostic procedure done after 15 weeks gestation  The procedure collects a small sample of amniotic fluid for the purpose of chromosomal testing and/or other genetic testing  Diagnostic result; more than 99% sensitivity for fetal chromosome abnormalities  Testing for AFP in the amniotic fluid can test for open neural tube defects      CARRIER SCREENING   Expanded carrier screening is available to screen for autosomal recessive conditions and X-linked conditions in a large list of genes. Autosomal recessive conditions happen when a mutation has been inherited from the egg and sperm and include conditions like cystic fibrosis, thalassemia, hearing loss, spinal muscular atrophy, and more. X-linked conditions happen when a mutation has been inherited from the egg and include conditions like fragile X syndrome. [place] screening was also reviewed. About [place] Screening.    We discussed that expanded carrier screening is designed to identify carrier status for conditions that are primarily childhood or adolescent onset. Expanded carrier screening does not evaluate for adult-onset conditions such as hereditary cancer syndromes, dementia/ Alzheimer's disease, or cardiovascular disease risk factors. Additionally, expanded carrier screening is not comprehensive for all known genetic diseases or inherited conditions. It will not intentionally screen for autosomal dominant conditions. This is a screening test, and residual carrier status risk figures will be provided to the patient after results become available.     The patient has declined the carrier screening options today. They are aware the option will remain, and they can contact us if they would like to pursue screening.         It was a pleasure to be involved with Tere quispe care. Face-to-face time of the meeting was 30  minutes.    Enma Mckee GC, MS  [facility]  Maternal Fetal Medicine  Office: [phone]  [facility]: [phone]   Fax: [phone]  [facility]      Patient seen, evaluated and discussed with the Genetic Counseling Intern. I have verified the content of the note, which accurately reflects my assessment of the patient and the plan of care.    Supervising Genetic Counselor  Andreia Montes MS, [facility]  Maternal Fetal Medicine  [email or web address]  [phone]

## 2023-09-18 ENCOUNTER — TELEPHONE (OUTPATIENT)
Dept: MATERNAL FETAL MEDICINE | Facility: CLINIC | Age: 33
End: 2023-09-18
Payer: COMMERCIAL

## 2023-09-18 LAB — SCANNED LAB RESULT: NORMAL

## 2023-09-18 NOTE — TELEPHONE ENCOUNTER
September 18, 2023    I spoke with Tere regarding her NIPT results.     Results indicate NO ANEUPLOIDY DETECTED for chromosomes 21, 18, 13, or sex chromosomes (XY predicted).     This puts her current pregnancy at low risk for Down syndrome, trisomy 18, trisomy 13 and sex chromosome abnormalities. This test is reported to have the following sensitivities: Down syndrome: 99.99%, trisomy 18: 99.99%, and trisomy 13: 99.99%. Although these results are reassuring, this does not replace a standard chromosome analysis from a chorionic villus sampling or amniocentesis.     Level II ultrasound is recommended, given AMA, and scheduled for 10/23/2023.    MSAFP is the appropriate second trimester screening test for open neural tube defects; the maternal quad screen is not recommended.    Her results are available in her Epic chart for her primary OB to review.       Enma Mckee GC, MS  [facility]  Maternal Fetal Medicine  Office: [phone]  Pager: [phone]  MF: [phone]   Fax: [phone]  [facility]

## 2023-10-02 NOTE — PATIENT INSTRUCTIONS
"Weeks 14 to 18 of Your Pregnancy: Care Instructions  Around this time, you may start to look pregnant. Your baby is now able to pass urine. And the first stool (meconium) is starting to collect in your baby's intestines. Hair is starting to grow on your baby's head.    You may notice some skin changes, such as itchy spots on your palms or acne on your face.   At your next doctor visit, you may have an ultrasound. So you might think about whether you want to know the sex of your baby. Also ask your doctor about flu and COVID-19 shots.      How to reduce stress   Ask for help when you need it.  Try to avoid things that cause you stress.  Seek out things that relieve stress, such as breathing exercises or yoga.     How to get exercise   If you don't usually exercise, start slowly. Short walks may be a good choice.  Try to be active 30 minutes a day, at least 5 days a week.  Avoid activities where you're more likely to fall.  Use light weights to reduce stress on your joints.     How to stay at a healthy weight for you   Talk to your doctor or midwife about how much weight you should gain.  It's generally best to gain:  About 28 to 40 pounds if you're underweight.  About 25 to 35 pounds if you're at a healthy weight.  About 15 to 25 pounds if you're overweight.  About 11 to 20 pounds if you're very overweight (obese).  Follow-up care is a key part of your treatment and safety. Be sure to make and go to all appointments, and call your doctor if you are having problems. It's also a good idea to know your test results and keep a list of the medicines you take.  Where can you learn more?  Go to https://www.Artspace.net/patiented  Enter I453 in the search box to learn more about \"Weeks 14 to 18 of Your Pregnancy: Care Instructions.\"  Current as of: November 9, 2022               Content Version: 13.7    3416-4376 [employer].   Care instructions adapted under license by your healthcare professional. If you " have questions about a medical condition or this instruction, always ask your healthcare professional. [employer] disclaims any warranty or liability for your use of this information.                                                       If you have any questions regarding your visit, Please contact your care team.     [facility]: [phone]    To Schedule an Appointment 24/7  Call: [phone] HOURS TELEPHONE NUMBER     Gómez Richardson MD  Medical Director    Sebastian Tabor-BRENT Brooks-RN  Marybel Dietz-Surgery Scheduler  Bonny-Surgery Scheduler               Tuesday-[place]  7:30 a.m-4:30 p.m    Thursday-[place]  7:30 a.m-4:30 p.m    Typical Surgery Days: Tuesday or Friday [facility]  [address] [place] [address]  PH: [phone]     Imaging Scheduling all locations  PH: [phone]     [facility] Labor and Delivery  [address] [place] [place] [address]  PH: [phone]    [facility]  [address] [place] [address]  PH: [phone] Fax      **Surgeries** Our Surgery Schedulers will contact you to schedule. If you do not receive a call within 3 business days, please call [phone].    Urgent Care locations:  [facility] Monday-Friday  10 am - 8 pm  Saturday and Sunday   9 am - 5 pm  Monday-Friday   10 am - 8 pm  Saturday and Sunday   9 am - 5 pm   [phone]   If you need a medication refill, please contact your pharmacy. Please allow 3 business days for your refill to be completed.  As always, Thank you for trusting us with your healthcare needs!    see additional instructions from your care team below

## 2023-10-04 ENCOUNTER — PRENATAL OFFICE VISIT (OUTPATIENT)
Dept: OBGYN | Facility: CLINIC | Age: 33
End: 2023-10-04
Payer: COMMERCIAL

## 2023-10-04 VITALS
WEIGHT: 165.6 LBS | OXYGEN SATURATION: 98 % | DIASTOLIC BLOOD PRESSURE: 76 MMHG | SYSTOLIC BLOOD PRESSURE: 118 MMHG | HEART RATE: 95 BPM | BODY MASS INDEX: 30.87 KG/M2

## 2023-10-04 DIAGNOSIS — Z34.00 SUPERVISION OF NORMAL FIRST PREGNANCY, ANTEPARTUM: Primary | ICD-10-CM

## 2023-10-04 PROCEDURE — 99207 PR PRENATAL VISIT: CPT | Performed by: OBSTETRICS & GYNECOLOGY

## 2023-10-04 NOTE — PROGRESS NOTES
Patient presents for routine prenatal visit at 15w3d.  Patient without complaint.   PE: See OB vitals  Body mass index is 30.87 kg/m .    Questions asked and answered.      Gómez Richardson MD FACOG

## 2023-10-23 ENCOUNTER — HOSPITAL ENCOUNTER (OUTPATIENT)
Dept: ULTRASOUND IMAGING | Facility: CLINIC | Age: 33
Discharge: HOME | End: 2023-10-23
Attending: OBSTETRICS & GYNECOLOGY
Payer: COMMERCIAL

## 2023-10-23 ENCOUNTER — OFFICE VISIT (OUTPATIENT)
Dept: MATERNAL FETAL MEDICINE | Facility: CLINIC | Age: 33
End: 2023-10-23
Attending: OBSTETRICS & GYNECOLOGY
Payer: COMMERCIAL

## 2023-10-23 DIAGNOSIS — Z36.2 ENCOUNTER FOR FOLLOW-UP ULTRASOUND OF FETAL ANATOMY: Primary | ICD-10-CM

## 2023-10-23 DIAGNOSIS — O44.00 PLACENTA PREVIA ANTEPARTUM: ICD-10-CM

## 2023-10-23 DIAGNOSIS — O09.511 AMA (ADVANCED MATERNAL AGE) PRIMIGRAVIDA 35+, FIRST TRIMESTER: ICD-10-CM

## 2023-10-23 PROCEDURE — 76811 OB US DETAILED SNGL FETUS: CPT

## 2023-10-23 PROCEDURE — 99213 OFFICE O/P EST LOW 20 MIN: CPT | Mod: 25 | Performed by: STUDENT IN AN ORGANIZED HEALTH CARE EDUCATION/TRAINING PROGRAM

## 2023-10-23 PROCEDURE — 76811 OB US DETAILED SNGL FETUS: CPT | Mod: 26 | Performed by: STUDENT IN AN ORGANIZED HEALTH CARE EDUCATION/TRAINING PROGRAM

## 2023-10-23 NOTE — NURSING NOTE
Patient reports no pain, no contractions, leaking of fluid, or bleeding. SBAR given to MICHELLE ZIMMERMAN, see their note in Epic.

## 2023-10-23 NOTE — PROGRESS NOTES
"Please see \"Imaging\" tab under \"Chart Review\" for details of today's visit.    Dahlia Nicole    "

## 2023-11-13 ENCOUNTER — HOSPITAL ENCOUNTER (OUTPATIENT)
Dept: ULTRASOUND IMAGING | Facility: CLINIC | Age: 33
Discharge: HOME | End: 2023-11-13
Attending: STUDENT IN AN ORGANIZED HEALTH CARE EDUCATION/TRAINING PROGRAM
Payer: COMMERCIAL

## 2023-11-13 ENCOUNTER — OFFICE VISIT (OUTPATIENT)
Dept: MATERNAL FETAL MEDICINE | Facility: CLINIC | Age: 33
End: 2023-11-13
Attending: STUDENT IN AN ORGANIZED HEALTH CARE EDUCATION/TRAINING PROGRAM
Payer: COMMERCIAL

## 2023-11-13 DIAGNOSIS — O44.02 PLACENTA PREVIA IN SECOND TRIMESTER: Primary | ICD-10-CM

## 2023-11-13 DIAGNOSIS — Z36.2 ENCOUNTER FOR FOLLOW-UP ULTRASOUND OF FETAL ANATOMY: ICD-10-CM

## 2023-11-13 DIAGNOSIS — O44.00 PLACENTA PREVIA ANTEPARTUM: ICD-10-CM

## 2023-11-13 PROCEDURE — 76816 OB US FOLLOW-UP PER FETUS: CPT

## 2023-11-13 PROCEDURE — 76816 OB US FOLLOW-UP PER FETUS: CPT | Mod: 26 | Performed by: OBSTETRICS & GYNECOLOGY

## 2023-11-13 NOTE — NURSING NOTE
Patient presents to [facility] for RL2 at 21w1d due to suboptimal anatomy, previa. Positive fetal movement. Denies LOF, vaginal bleeding or cramping/contractions. SBAR given to M MD, see their note in Epic.

## 2023-11-13 NOTE — PROGRESS NOTES
Please see full imaging report from ViewPoint program under imaging tab.    Placenta previa resolved     Cezar Dial MD  Maternal Fetal Medicine

## 2023-11-14 NOTE — PATIENT INSTRUCTIONS
"Weeks 18 to 22 of Your Pregnancy: Care Instructions  At this stage you may find that your nausea and fatigue are gone. You may feel better overall and have more energy. But you might now also have some new discomforts, like sleep problems or leg cramps.    You may start to feel your baby move. These movements can feel like butterflies or bubbles.   Babies at this stage can now suck their thumbs.     Get some exercise every day.  And avoid caffeine late in the day.     Take a warm shower or bath before bed.  Try relaxation exercises to calm your mind and body.     Use extra pillows.  They can help you get comfortable.     Don't use sleeping pills or alcohol.  They could harm your baby.     For leg cramps, stretch and apply heat.  A warm bath, leg warmers, a heating pad, or a hot water bottle can help with muscle aches.   Stretches for leg cramps    Straighten your leg and bend your foot (flex your ankle) slowly upward, toward your knee. Bend your toes up and down.   Stand on a flat surface. Stretch your toes upward. For balance, hold on to the wall or something stable. If it feels okay, take small steps walking on your heels.   Follow-up care is a key part of your treatment and safety. Be sure to make and go to all appointments, and call your doctor if you are having problems. It's also a good idea to know your test results and keep a list of the medicines you take.  Where can you learn more?  Go to https://www.[employer].net/patiented  Enter W603 in the search box to learn more about \"Weeks 18 to 22 of Your Pregnancy: Care Instructions.\"  Current as of: July 11, 2023               Content Version: 13.8    1192-8279 [employer].   Care instructions adapted under license by your healthcare professional. If you have questions about a medical condition or this instruction, always ask your healthcare professional. [employer] disclaims any warranty or liability for your use of this " information.                                                       If you have any questions regarding your visit, Please contact your care team.     [employer] [facility]: [phone]    To Schedule an Appointment 24/7  Call: [phone] HOURS TELEPHONE NUMBER     Gómez Richardson MD  Medical Director        Sharona-BRENT Brooks-BRENT Dietz-Surgery Scheduler  Bonny-Surgery Scheduler               Tuesday-[place]  7:30 a.m-4:30 p.m    Thursday-[place]  7:30 a.m-4:30 p.m    Typical Surgery Days: Tuesday or Friday [facility] [place]  [address] [place] [address]  PH: [phone]    Imaging Scheduling all locations  PH: [phone]     [facility] Labor and Delivery  [address] [place] [address] [place] [address]  PH: [phone]    [facility]  [address] [place] [address]  PH: [phone] Fax      **Surgeries** Our Surgery Schedulers will contact you to schedule. If you do not receive a call within 3 business days, please call [phone].    Urgent Care locations:  [facility] Monday-Friday  10 am - 8 pm  Saturday and Sunday   9 am - 5 pm  Monday-Friday   10 am - 8 pm  Saturday and Sunday   9 am - 5 pm   [phone]   If you need a medication refill, please contact your pharmacy. Please allow 3 business days for your refill to be completed.  As always, Thank you for trusting us with your healthcare needs!    see additional instructions from your care team below

## 2023-11-16 ENCOUNTER — PRENATAL OFFICE VISIT (OUTPATIENT)
Dept: OBGYN | Facility: CLINIC | Age: 33
End: 2023-11-16
Payer: COMMERCIAL

## 2023-11-16 VITALS
HEIGHT: 61 IN | BODY MASS INDEX: 33.19 KG/M2 | SYSTOLIC BLOOD PRESSURE: 119 MMHG | WEIGHT: 175.8 LBS | OXYGEN SATURATION: 97 % | HEART RATE: 90 BPM | DIASTOLIC BLOOD PRESSURE: 80 MMHG

## 2023-11-16 DIAGNOSIS — Z34.00 SUPERVISION OF NORMAL FIRST PREGNANCY, ANTEPARTUM: Primary | ICD-10-CM

## 2023-11-16 PROCEDURE — 99207 PR PRENATAL VISIT: CPT | Performed by: OBSTETRICS & GYNECOLOGY

## 2023-11-16 NOTE — PROGRESS NOTES
Patient presents for routine prenatal visit at 21w4d.  Patient with complaint. hAVING SOME llq PAIN CONSISTENT WITH LIGAMENT PAINS.  PE: See OB vitals  There is no height or weight on file to calculate BMI.    No nausea/vomiting. No heartburn.  No vaginal bleeding, no contractions/severe cramping, no leakage of fluid.  No vaginal discharge. No dysuria  No headache, vision changes, lower extremity swelling, upper abdominal pain, chest pain, shortness of breath.   Screening ultrasound DONE  1 HOUR GTT ON rtc   Questions asked and answered.      Gómez Richardson MD FACOG

## 2023-12-07 NOTE — PATIENT INSTRUCTIONS
"Weeks 22 to 26 of Your Pregnancy: Care Instructions  Your baby's lungs are getting ready for breathing. Your baby may respond to your voice. Your baby likely turns less, and kicks or jerks more. Jerking may mean that your baby has hiccups.    Think about taking childbirth classes. And start to think about whether you want to have pain medicine during labor.   At your next doctor visit, you may be tested for anemia and for high blood sugar that first occurs during pregnancy (gestational diabetes). These conditions can cause problems for you and your baby.     To ease discomfort, such as back pain    Change your position often. Try not to sit or stand for too long.  Get some exercise. Things like walking or stretching may help.  Try using a heating pad or cold pack.    To ease or reduce swelling in your feet, ankles, hands, and fingers    Take off your rings.  Avoid high-sodium foods, such as potato chips.  Prop up your feet, and sleep with pillows under your feet.  Try to avoid standing for long periods of time.  Do not wear tight shoes.  Wear support stockings.  Kegel exercises to prevent urine from leaking    Squeeze your muscles as if you were trying not to pass gas. Your belly, legs, and buttocks shouldn't move. Hold the squeeze for 3 seconds, then relax for 5 to 10 seconds.    Add 1 second each week until you can squeeze for 10 seconds. Repeat the exercise 10 times a session. Do 3 to 8 sessions a day. If these exercises cause you pain, stop doing them and talk with your doctor.  Follow-up care is a key part of your treatment and safety. Be sure to make and go to all appointments, and call your doctor if you are having problems. It's also a good idea to know your test results and keep a list of the medicines you take.  Where can you learn more?  Go to https://www.healthwise.net/patiented  Enter G264 in the search box to learn more about \"Weeks 22 to 26 of Your Pregnancy: Care Instructions.\"  Current as of: July " 11, 2023               Content Version: 13.8    2631-7712 [employer].   Care instructions adapted under license by your healthcare professional. If you have questions about a medical condition or this instruction, always ask your healthcare professional. [employer] disclaims any warranty or liability for your use of this information.                                                       If you have any questions regarding your visit, Please contact your care team.     [employer] [facility]: [phone]    To Schedule an Appointment 24/7  Call: [phone] [facility] HOURS TELEPHONE NUMBER     Gómez Richardson MD  Medical Director        Louisa Brooks-BRENT Dietz-Surgery Scheduler  Bonny-Surgery Scheduler               Tuesday-[place]  7:30 a.m-4:30 p.m    Thursday-[place]  7:30 a.m-4:30 p.m    Typical Surgery Days: Tuesday or Friday [facility] [place]  [address] [place] [address]  PH: [phone]    Imaging Scheduling all locations  PH: [phone]     [facility] Labor and Delivery  [address] [address]  PH: [phone]    [facility]  [address] [place] [address]  PH: [phone] Fax      **Surgeries** Our Surgery Schedulers will contact you to schedule. If you do not receive a call within 3 business days, please call [phone].    Urgent Care locations:  [facility] Monday-Friday  10 am - 8 pm  Saturday and Sunday   9 am - 5 pm  Monday-Friday   10 am - 8 pm  Saturday and Sunday   9 am - 5 pm   [phone]   If you need a medication refill, please contact your pharmacy. Please allow 3 business days for your refill to be completed.  As always, Thank you for trusting us with your healthcare needs!    see additional instructions from your care team below

## 2023-12-13 ENCOUNTER — PRENATAL OFFICE VISIT (OUTPATIENT)
Dept: OBGYN | Facility: CLINIC | Age: 33
End: 2023-12-13
Payer: COMMERCIAL

## 2023-12-13 VITALS
BODY MASS INDEX: 33.23 KG/M2 | OXYGEN SATURATION: 100 % | HEART RATE: 94 BPM | SYSTOLIC BLOOD PRESSURE: 115 MMHG | WEIGHT: 178.3 LBS | DIASTOLIC BLOOD PRESSURE: 67 MMHG

## 2023-12-13 DIAGNOSIS — Z34.00 SUPERVISION OF NORMAL FIRST PREGNANCY, ANTEPARTUM: Primary | ICD-10-CM

## 2023-12-13 DIAGNOSIS — O24.410 DIET CONTROLLED GESTATIONAL DIABETES MELLITUS (GDM), ANTEPARTUM: ICD-10-CM

## 2023-12-13 LAB
GLUCOSE 1H P 50 G GLC PO SERPL-MCNC: 225 MG/DL (ref 70–129)
HGB BLD-MCNC: 11.7 G/DL (ref 11.7–15.7)

## 2023-12-13 PROCEDURE — 99207 PR PRENATAL VISIT: CPT | Performed by: OBSTETRICS & GYNECOLOGY

## 2023-12-13 PROCEDURE — 36415 COLL VENOUS BLD VENIPUNCTURE: CPT | Performed by: OBSTETRICS & GYNECOLOGY

## 2023-12-13 PROCEDURE — 82950 GLUCOSE TEST: CPT | Performed by: OBSTETRICS & GYNECOLOGY

## 2023-12-13 NOTE — PROGRESS NOTES
Patient presents for routine prenatal visit at 25w3d.  Patient without complaint.   PE: See OB vitals  Body mass index is 33.23 kg/m .    Doing well.  No concerns today.  No vaginal bleeding, no contractions, no leakage of fluid  No nausea/vomiting. No heartburn  No vaginal discharge. No dysuria.   No headache, vision changes, lower extremity swelling, upper abdominal pain, chest pain, shortness of breath  Tdap planned next visit    Questions asked and answered.      Gómez Richardson MD FACOG

## 2023-12-21 ENCOUNTER — VIRTUAL VISIT (OUTPATIENT)
Dept: EDUCATION SERVICES | Facility: CLINIC | Age: 33
End: 2023-12-21
Payer: COMMERCIAL

## 2023-12-21 DIAGNOSIS — O24.410 DIET CONTROLLED GESTATIONAL DIABETES MELLITUS (GDM), ANTEPARTUM: ICD-10-CM

## 2023-12-21 PROCEDURE — G0108 DIAB MANAGE TRN  PER INDIV: HCPCS | Mod: 93

## 2023-12-21 RX ORDER — LANCETS
EACH MISCELLANEOUS
Qty: 200 EACH | Refills: 2 | Status: ON HOLD | OUTPATIENT
Start: 2023-12-21 | End: 2024-02-26

## 2023-12-21 NOTE — PATIENT INSTRUCTIONS
Test glucose 4 times per day:   Fasting (when you first awake for the day): 95 mg/dL or below   1 hour after breakfast: 140 mg/dL or below   1 hour after lunch: 140 mg/dL or below   1 hour after dinner: 140 mg/dL or below     Please bring your meter and log book to all appointments     If you miss 1 hour after meal test, test 2 hours after the meal.  Goal 2 hours after is 120 mg/dL or below.     2.  Check your urine ketones once a day, when you first awake for the day until they are negative to trace for 7 days in a row.  Then decrease and check once a week.     3.  Meal Plan    Breakfast: 30 grams carbohydrate + protein   Snack: 15-30 grams carbohydrate + protein  Lunch: 45-60 grams carbohydrate + protein  Snack: 15-30 grams carbohydrate + protein  Dinner: 45-60 grams carbohydrate + protein  Snack: 15-30 grams carbohydrate + protein    A few tips:   -consume some carbohydrate every 2-3 hours while awake   -you need a minimum of 175 grams of carbohydrate per day   -fruit and cold breakfast cereal are best tolerated at lunch or later   -protein includes: cheese, eggs, fish, nuts, nut butter, chicken, turkey, beef, and pork   -snack ideas: an individual container of Greek yogurt (try Chobani Less Sugar), whole grain crackers and cheese, chocolate fairlife milk, a Kashi or KIND bar, a baseball size piece of whole fruit + nut butter (apple + peanut butter), fruit canned in it's own juice + cottage cheese    4.  Aim for 20-30 minutes of activity most days of the week (with the okay of your OB).      5.  Follow up: 12/28/23 with Silva via phone at 10:30 AM    6.  Call Diabetes Education at [phone] or send a [employer] message with:   -questions or concerns   -ketones that are small, moderate, or large   -3 or more blood sugars above target in a 7 day period    Thank you,     Silva Saldana, MPH, RD, CDCES, LD 12/21/2023   ,

## 2023-12-21 NOTE — LETTER
"    12/21/2023         RE: Tere Alves  [address]  [place] [address]        Dear Colleague,    Thank you for referring your patient, Tere Alves, to the [facility]. Please see a copy of my visit note below.    Diabetes Self-Management Education & Support  Type of Service: Telephone Visit    Originating Location (Patient Location): Home  Distant Location (Provider Location): [facility]  Mode of Communication:  Telephone    Telephone Visit Start Time:  8:05 AM  Telephone Visit End Time (telephone visit stop time): 9:07 AM    How would patient like to obtain AVS? MyChart    SUBJECTIVE/OBJECTIVE:  Presents for education related to gestational diabetes.    Accompanied by: Self  Diabetes management related comments/concerns: \"how do I take care of myself to get rid of this diabetes?\"  Gestational weeks: 26w4d  Hospital planned for delivery: [facility]  Next OB Visit Date:  (not yet scheduled)  Number of previous pregnancies: 0  Have you ever had thyroid problems or taken thyroid medication?: No  Heart disease, mitral valve prolapse or rheumatic fever?: No  Hypertension : No  High Cholesterol: No  High Triglycerides: No  Do you use tobacco products?: No  Do you drink beer, wine or hard liquor?: No    Cultural Influences/Ethnic Background:  Not  or     Estimated Date of Delivery: Mar 24, 2024    1 hour OGTT  Lab Results   Component Value Date    GLU1 225 (H) 12/13/2023     3 hour OGTT    Fasting  No results found for: \"GTTGF\"    1 hour  No results found for: \"GTTG1\"    2 hour  No results found for: \"GTTG2\"    3 hour  No results found for: \"GTTG3\"    Lifestyle and Health Behaviors:  Pre-pregnancy weight (lbs): 160  Exercise:: Yes (ADLs and walking)  Days per week of moderate to strenuous exercise (like a brisk walk): 7  On average, minutes per day of exercise at this level: 30  Exercise Minutes per Week: 210  Cultural/Yarsani diet restrictions?: No  How many " times a week on average do you eat food made away from home (restaurant/take-out)?: 2  Breakfast: 8 AM: tumeric tea; 10-10:30 AM: eggs, avocado, and toast OR oatmeal, milk (whole milk)  Lunch: 1:30-2 PM: yesterday = fried rice (chicken and vegetables), water  Dinner: 7-7:30 PM: last night = quinoa, chicken, lentils, and spinach, water  Snacks: AM: nuts and/or fruit such as prune and/or Wheat Thin crackers; PM: sometimes apple or coconut water; HS: last night = apple and peanut butter  Other: up for day at 8 AM; to bed at 10 PM; since learning of GDM dx has decreased intake of white rice and white bread products and sweets including coffee drinks and sweetened tea  Pre- vitamin?: Yes  Supplements?: No  Experiencing nausea?: No  Experiencing heartburn?: No    Healthy Coping:  Emotional response to diabetes: Ready to learn  Informal Support system:: Spouse  Stage of change: ACTION (Actively working towards change)    Current Management:  Taking medications for gestational diabetes?: No  Difficulty affording diabetes testing supplies?:  (n/a)    ASSESSMENT:  Discussed pathophysiology of gestational diabetes.  Introduced tools for managing gestational diabetes including blood glucose monitoring, urine ketone testing, meal planning, and physical activity.  Prescriptions for a meter and testing supplies electronically sent to pharmacy.      Discussed carbohydrate sources and impact on blood glucose. Reviewed basics of healthy eating and incorporating a variety of foods into meal plan. Instructed on carbohydrate counting and label reading.  Encouraged eating breakfast within 1 hour of waking.  Protein was encouraged with meals and snacks, especially with the night snack. Reviewed benefits of physical activity to help lower blood glucose and walking after meals, as tolerated and per MD approval.     INTERVENTION:  Educational topics covered today:  GDM pathophysiology, Means of controlling GDM, Using a Blood Glucose  Monitor, Blood Glucose Goals, Logging and Interpreting Glucose Results, Ketone Testing, When to Call a Diabetes Educator or OB Provider, Healthy Eating During Pregnancy, Counting Carbohydrates, Meal Planning for GDM, and Physical Activity    Educational materials emailed today:   [place] Understanding Gestational Diabetes  GDM Log Book  Sharps Disposal  Care After Delivery  GDM HS snack list  GDM Food log    Pt verbalized understanding of concepts discussed and recommendations provided today.     PLAN:  Test glucose 4 times per day:   Fasting (when you first awake for the day): 95 mg/dL or below   1 hour after breakfast: 140 mg/dL or below   1 hour after lunch: 140 mg/dL or below   1 hour after dinner: 140 mg/dL or below     Please bring your meter and log book to all appointments     If you miss 1 hour after meal test, test 2 hours after the meal.  Goal 2 hours after is 120 mg/dL or below.     2.  Check your urine ketones once a day, when you first awake for the day until they are negative to trace for 7 days in a row.  Then decrease and check once a week.     3.  Meal Plan    Breakfast: 30 grams carbohydrate + protein   Snack: 15-30 grams carbohydrate + protein  Lunch: 45-60 grams carbohydrate + protein  Snack: 15-30 grams carbohydrate + protein  Dinner: 45-60 grams carbohydrate + protein  Snack: 15-30 grams carbohydrate + protein    A few tips:   -consume some carbohydrate every 2-3 hours while awake   -you need a minimum of 175 grams of carbohydrate per day   -fruit and cold breakfast cereal are best tolerated at lunch or later   -protein includes: cheese, eggs, fish, nuts, nut butter, chicken, turkey, beef, and pork   -snack ideas: an individual container of Greek yogurt (try Chobani Less Sugar), whole grain crackers and cheese, chocolate fairlife milk, a Kashi or KIND bar, a baseball size piece of whole fruit + nut butter (apple + peanut butter), fruit canned in it's own juice + cottage cheese    4.  Aim for  20-30 minutes of activity most days of the week (with the okay of your OB).      5.  Follow up: 12/28/23 with Silva via phone at 10:30 AM    6.  Call Diabetes Education at [phone] or send a [email or web address] message with:   -questions or concerns   -ketones that are small, moderate, or large   -3 or more blood sugars above target in a 7 day period    Silva Saldana, MPH, RD, CDCES, LD 12/21/2023    Time Spent: 62 minutes  Encounter Type: Individual    Any diabetes medication dose changes were made via the CDE Protocol and Collaborative Practice Agreement with the patient's referring provider. A copy of this encounter was shared with the provider.

## 2023-12-21 NOTE — PROGRESS NOTES
"Diabetes Self-Management Education & Support  Type of Service: Telephone Visit    Originating Location (Patient Location): Home  Distant Location (Provider Location): [facility] [place], [place]  Mode of Communication:  Telephone    Telephone Visit Start Time:  8:05 AM  Telephone Visit End Time (telephone visit stop time): 9:07 AM    How would patient like to obtain AVS? Galo    SUBJECTIVE/OBJECTIVE:  Presents for education related to gestational diabetes.    Accompanied by: Self  Diabetes management related comments/concerns: \"how do I take care of myself to get rid of this diabetes?\"  Gestational weeks: 26w4d  Hospital planned for delivery: [facility]  Next OB Visit Date:  (not yet scheduled)  Number of previous pregnancies: 0  Have you ever had thyroid problems or taken thyroid medication?: No  Heart disease, mitral valve prolapse or rheumatic fever?: No  Hypertension : No  High Cholesterol: No  High Triglycerides: No  Do you use tobacco products?: No  Do you drink beer, wine or hard liquor?: No    Cultural Influences/Ethnic Background:  Not  or     Estimated Date of Delivery: Mar 24, 2024    1 hour OGTT  Lab Results   Component Value Date    GLU1 225 (H) 12/13/2023     3 hour OGTT    Fasting  No results found for: \"GTTGF\"    1 hour  No results found for: \"GTTG1\"    2 hour  No results found for: \"GTTG2\"    3 hour  No results found for: \"GTTG3\"    Lifestyle and Health Behaviors:  Pre-pregnancy weight (lbs): 160  Exercise:: Yes (ADLs and walking)  Days per week of moderate to strenuous exercise (like a brisk walk): 7  On average, minutes per day of exercise at this level: 30  Exercise Minutes per Week: 210  Cultural/Cheondoism diet restrictions?: No  How many times a week on average do you eat food made away from home (restaurant/take-out)?: 2  Breakfast: 8 AM: tumeric tea; 10-10:30 AM: eggs, avocado, and toast OR oatmeal, milk (whole milk)  Lunch: 1:30-2 PM: yesterday = fried rice (chicken and vegetables), " water  Dinner: 7-7:30 PM: last night = quinoa, chicken, lentils, and spinach, water  Snacks: AM: nuts and/or fruit such as prune and/or Wheat Thin crackers; PM: sometimes apple or coconut water; HS: last night = apple and peanut butter  Other: up for day at 8 AM; to bed at 10 PM; since learning of GDM dx has decreased intake of white rice and white bread products and sweets including coffee drinks and sweetened tea  Pre-emmett vitamin?: Yes  Supplements?: No  Experiencing nausea?: No  Experiencing heartburn?: No    Healthy Coping:  Emotional response to diabetes: Ready to learn  Informal Support system:: Spouse  Stage of change: ACTION (Actively working towards change)    Current Management:  Taking medications for gestational diabetes?: No  Difficulty affording diabetes testing supplies?:  (n/a)    ASSESSMENT:  Discussed pathophysiology of gestational diabetes.  Introduced tools for managing gestational diabetes including blood glucose monitoring, urine ketone testing, meal planning, and physical activity.  Prescriptions for a meter and testing supplies electronically sent to pharmacy.      Discussed carbohydrate sources and impact on blood glucose. Reviewed basics of healthy eating and incorporating a variety of foods into meal plan. Instructed on carbohydrate counting and label reading.  Encouraged eating breakfast within 1 hour of waking.  Protein was encouraged with meals and snacks, especially with the night snack. Reviewed benefits of physical activity to help lower blood glucose and walking after meals, as tolerated and per MD approval.     INTERVENTION:  Educational topics covered today:  GDM pathophysiology, Means of controlling GDM, Using a Blood Glucose Monitor, Blood Glucose Goals, Logging and Interpreting Glucose Results, Ketone Testing, When to Call a Diabetes Educator or OB Provider, Healthy Eating During Pregnancy, Counting Carbohydrates, Meal Planning for GDM, and Physical Activity    Educational  materials emailed today:   [place] Understanding Gestational Diabetes  GDM Log Book  Sharps Disposal  Care After Delivery  GDM HS snack list  GDM Food log    Pt verbalized understanding of concepts discussed and recommendations provided today.     PLAN:  Test glucose 4 times per day:   Fasting (when you first awake for the day): 95 mg/dL or below   1 hour after breakfast: 140 mg/dL or below   1 hour after lunch: 140 mg/dL or below   1 hour after dinner: 140 mg/dL or below     Please bring your meter and log book to all appointments     If you miss 1 hour after meal test, test 2 hours after the meal.  Goal 2 hours after is 120 mg/dL or below.     2.  Check your urine ketones once a day, when you first awake for the day until they are negative to trace for 7 days in a row.  Then decrease and check once a week.     3.  Meal Plan    Breakfast: 30 grams carbohydrate + protein   Snack: 15-30 grams carbohydrate + protein  Lunch: 45-60 grams carbohydrate + protein  Snack: 15-30 grams carbohydrate + protein  Dinner: 45-60 grams carbohydrate + protein  Snack: 15-30 grams carbohydrate + protein    A few tips:   -consume some carbohydrate every 2-3 hours while awake   -you need a minimum of 175 grams of carbohydrate per day   -fruit and cold breakfast cereal are best tolerated at lunch or later   -protein includes: cheese, eggs, fish, nuts, nut butter, chicken, turkey, beef, and pork   -snack ideas: an individual container of Greek yogurt (try Chobani Less Sugar), whole grain crackers and cheese, chocolate fairlife milk, a Kashi or KIND bar, a baseball size piece of whole fruit + nut butter (apple + peanut butter), fruit canned in it's own juice + cottage cheese    4.  Aim for 20-30 minutes of activity most days of the week (with the okay of your OB).      5.  Follow up: 12/28/23 with Silva via phone at 10:30 AM    6.  Call Diabetes Education at [phone] or send a Distra message with:   -questions or concerns   -ketones  that are small, moderate, or large   -3 or more blood sugars above target in a 7 day period    Silva Saldana, MPH, RD, CDCES, LD 12/21/2023    Time Spent: 62 minutes  Encounter Type: Individual    Any diabetes medication dose changes were made via the CDE Protocol and Collaborative Practice Agreement with the patient's referring provider. A copy of this encounter was shared with the provider.

## 2024-01-04 ENCOUNTER — VIRTUAL VISIT (OUTPATIENT)
Dept: EDUCATION SERVICES | Facility: CLINIC | Age: 34
End: 2024-01-04
Payer: COMMERCIAL

## 2024-01-04 DIAGNOSIS — O24.410 DIET CONTROLLED GESTATIONAL DIABETES MELLITUS (GDM), ANTEPARTUM: Primary | ICD-10-CM

## 2024-01-04 PROCEDURE — 97802 MEDICAL NUTRITION INDIV IN: CPT | Mod: 93 | Performed by: DIETITIAN, REGISTERED

## 2024-01-04 NOTE — PROGRESS NOTES
Diabetes Self-Management Education & Support  Type of Service: Telephone Visit    Originating Location (Patient Location): Home  Distant Location (Provider Location): [facility] [place]  Mode of Communication:  Telephone    Telephone Visit Start Time:  10:00 AM  Telephone Visit End Time (telephone visit stop time): 10:15 AM    How would patient like to obtain AVS? Galo    SUBJECTIVE/OBJECTIVE:  Presents for education related to gestational diabetes.    Accompanied by: Self  Diabetes management related comments/concerns: meal planning and numbers  Gestational weeks: 28w4d  Next OB Visit Date: 01/11/24  Number of previous pregnancies: 0  Had any babies over 9 lbs: No  Previously had Gestational Diabetes: No  Have you ever had thyroid problems or taken thyroid medication?: No  Heart disease, mitral valve prolapse or rheumatic fever?: No  Hypertension : No  High Cholesterol: No  High Triglycerides: No  Do you use tobacco products?: No  Do you drink beer, wine or hard liquor?: No    Cultural Influences/Ethnic Background:  Not  or     LMP 06/12/2023       Estimated Date of Delivery: Mar 24, 2024    Blood Glucose/Ketone Log:     Date Ketones Fasting Post Breakfast Post Lunch Post Supper   1/4 neg 87 106 - -   1/3 neg 95 116 150-grilled cheese and tomato soup 140   1/2 neg 90 121 99* 2 hrs 160   1/1 neg 85 122 87 158   12/31 neg 85 99 120 126   12/30 neg 90 134 106 149   12/29 neg 100 117 103 170     Coconut water - not often snacking. Sometimes nuts  -hasn't been able to walk after dinner d/t illness    Lifestyle and Health Behaviors:  Exercise:: Yes (ADLs and walking)  Days per week of moderate to strenuous exercise (like a brisk walk): 7  On average, minutes per day of exercise at this level: 30  Exercise Minutes per Week: 210  Cultural/Anabaptism diet restrictions?: No  How many times a week on average do you eat food made away from home (restaurant/take-out)?: 2  Meals include: Breakfast,  Lunch, Dinner  Breakfast: 8 AM: tumeric tea; 10-10:30 AM: eggs, avocado, and toast OR oatmeal, milk (whole milk)  Lunch: 1:30-2 PM: yesterday = fried rice (chicken and vegetables), water  Dinner: 7-7:30 PM: last night = quinoa or white rice, chicken or prok, lentils, and spinach, water  Snacks: AM: nuts and/or fruit such as prune and/or Wheat Thin crackers; PM: sometimes apple or coconut water; HS: last night = apple and peanut butter  Other: up for day at 8 AM; to bed at 10 PM; since learning of GDM dx has decreased intake of white rice and white bread products and sweets including coffee drinks and sweetened tea  Beverages: Water, Coffee  Biggest challenges to healthy eating: None  Pre- vitamin?: Yes  Supplements?: No  Experiencing nausea?: No  Experiencing heartburn?: No    Healthy Coping:  Emotional response to diabetes: Ready to learn  Informal Support system:: Spouse  Stage of change: ACTION (Actively working towards change)    Current Management:  Taking medications for gestational diabetes?: No  Difficulty affording diabetes medication?: No  Difficulty affording diabetes testing supplies?: No    ASSESSMENT:  Ketones: negative.   Fasting blood glucoses: 86% in target.  After breakfast: 100% in target.  After lunch: 83% in target.  After dinner: 20% in target.    Met with Tere today to discuss her glucose readings. Her dinner meal seems to be her biggest struggle. We reviewed afternoon snack ideas. Typically patient goes into dinner hungry and not snacking in the afternoon. Reviewed portion sizes of carbohydrates and snacks. Patient hasn't been feeling well this last week with a cold, therefore she has not been able to do her daily walks of 10 minutes after each meal. She hopes to do this as soon as she feels improved.   Discussed postpartum plan as well and potential need for insulin.    INTERVENTION:  Educational topics covered today:  What to expect after delivery, Future testing for Type 2 diabetes  (2 hour OGTT at 6 week post-partum check-up and annual fasting blood glucose level), Risk of GDM and planning ahead for future pregnancies, Recommended lifestyle interventions for reducing the risk of Type 2 Diabetes, When to Call a Diabetes Educator or OB Provider    Educational Materials provided today:  No new material    PLAN:  Check glucose 4 times daily.  Check ketones once a week when readings are consistently negative.  Continue with recommended physical activity.  Continue to follow recommended meal plan: 2 carbs at breakfast, 4 carbs at lunch, 4 carbs at supper, 1-2 carbs at snacks.  Follow consistent CHO meal plan, eat CHO and protein/fat at all meals/snacks.    Call/e-mail/[employer] message diabetes educator if 3 or more blood sugars are above the goal in 1 week or if ketones are positive.    Time Spent: 15 minutes  Encounter Type: Individual    Any diabetes medication dose changes were made via the CDE Protocol and Collaborative Practice Agreement with the patient's referring provider. A copy of this encounter was shared with the provider.

## 2024-01-04 NOTE — PATIENT INSTRUCTIONS
Goals for Gestational Diabetes Care:    1. Eat balanced meals (3 meals + 3 snacks daily)    Breakfast: 30 grams carbohydrate + Protein  Snack: 15-30 grams carbohydrate + protein  Lunch: 45-60 grams carbohydrate + protein/vegetables  Snack: 15-30 grams Carbohydrate + protein  Dinner: 45-60 grams carbohydrate + protein/vegetables  Bedtime Snack: 15-30 grams + protein    ----Make sure you include protein source with each meal and at bedtime - this has been shown to help with blood glucose elevations    2. Each Morning Check Ketones (small/mod/high - call Diabetes Care Line)  Your goal is negative or trace ketones. If you have ketones in your urine it means you are not eating enough before you go to bed. Eat a larger bedtime snack and include protein.     3. Aim to get at least 30 minutes of activity each day. Activity really helps improve blood sugars.     4. Blood Glucose Targets:   1. Fasting Less than 95 mg/dL   2. 1 hours after a meal target is less than 140 mg/dL  ----Always remember to bring meter and log book to all appointments.    Follow up with your Diabetic Educator to assess BG targets in 1 week.  If Blood glucose levels are above normal 3 times or more in one week and you cannot explain them or if you develop small, moderate or high ketones call Diabetes Care at [phone]    Call with any questions.    Thank you,  Destinee Schultz RDN, RICHARD, [facility]   Certified Diabetes Care &   [phone]

## 2024-01-04 NOTE — LETTER
1/4/2024         RE: Tere Alves  [address]  [place] [address]        Dear Colleague,    [facility] you [facility]. Please see a copy of my visit note below.      Diabetes Self-Management Education & Support  Type of Service: Telephone Visit    Originating Location (Patient Location): Home  Distant Location (Provider Location): [facility]  Mode of Communication:  Telephone    Telephone Visit Start Time:  10:00 AM  Telephone Visit End Time (telephone visit stop time): 10:15 AM    How would patient like to obtain AVS? MyChart    SUBJECTIVE/OBJECTIVE:  Presents for education related to gestational diabetes.    Accompanied by: Self  Diabetes management related comments/concerns: meal planning and numbers  Gestational weeks: 28w4d  Next OB Visit Date: 01/11/24  Number of previous pregnancies: 0  Had any babies over 9 lbs: No  Previously had Gestational Diabetes: No  Have you ever had thyroid problems or taken thyroid medication?: No  Heart disease, mitral valve prolapse or rheumatic fever?: No  Hypertension : No  High Cholesterol: No  High Triglycerides: No  Do you use tobacco products?: No  Do you drink beer, wine or hard liquor?: No    Cultural Influences/Ethnic Background:  Not  or     LMP 06/12/2023       Estimated Date of Delivery: Mar 24, 2024    Blood Glucose/Ketone Log:     Date Ketones Fasting Post Breakfast Post Lunch Post Supper   1/4 neg 87 106 - -   1/3 neg 95 116 150-grilled cheese and tomato soup 140   1/2 neg 90 121 99* 2 hrs 160   1/1 neg 85 122 87 158   12/31 neg 85 99 120 126   12/30 neg 90 134 106 149   12/29 neg 100 117 103 170     Coconut water - not often snacking. Sometimes nuts  -hasn't been able to walk after dinner d/t illness    Lifestyle and Health Behaviors:  Exercise:: Yes (ADLs and walking)  Days per week of moderate to strenuous exercise (like a brisk walk): 7  On  average, minutes per day of exercise at this level: 30  Exercise Minutes per Week: 210  Cultural/Worship diet restrictions?: No  How many times a week on average do you eat food made away from home (restaurant/take-out)?: 2  Meals include: Breakfast, Lunch, Dinner  Breakfast: 8 AM: tumeric tea; 10-10:30 AM: eggs, avocado, and toast OR oatmeal, milk (whole milk)  Lunch: 1:30-2 PM: yesterday = fried rice (chicken and vegetables), water  Dinner: 7-7:30 PM: last night = quinoa or white rice, chicken or prok, lentils, and spinach, water  Snacks: AM: nuts and/or fruit such as prune and/or Wheat Thin crackers; PM: sometimes apple or coconut water; HS: last night = apple and peanut butter  Other: up for day at 8 AM; to bed at 10 PM; since learning of GDM dx has decreased intake of white rice and white bread products and sweets including coffee drinks and sweetened tea  Beverages: Water, Coffee  Biggest challenges to healthy eating: None  Pre- vitamin?: Yes  Supplements?: No  Experiencing nausea?: No  Experiencing heartburn?: No    Healthy Coping:  Emotional response to diabetes: Ready to learn  Informal Support system:: Spouse  Stage of change: ACTION (Actively working towards change)    Current Management:  Taking medications for gestational diabetes?: No  Difficulty affording diabetes medication?: No  Difficulty affording diabetes testing supplies?: No    ASSESSMENT:  Ketones: negative.   Fasting blood glucoses: 86% in target.  After breakfast: 100% in target.  After lunch: 83% in target.  After dinner: 20% in target.    Met with Tere today to discuss her glucose readings. Her dinner meal seems to be her biggest struggle. We reviewed afternoon snack ideas. Typically patient goes into dinner hungry and not snacking in the afternoon. Reviewed portion sizes of carbohydrates and snacks. Patient hasn't been feeling well this last week with a cold, therefore she has not been able to do her daily walks of 10 minutes  after each meal. She hopes to do this as soon as she feels improved.   Discussed postpartum plan as well and potential need for insulin.    INTERVENTION:  Educational topics covered today:  What to expect after delivery, Future testing for Type 2 diabetes (2 hour OGTT at 6 week post-partum check-up and annual fasting blood glucose level), Risk of GDM and planning ahead for future pregnancies, Recommended lifestyle interventions for reducing the risk of Type 2 Diabetes, When to Call a Diabetes Educator or OB Provider    Educational Materials provided today:  No new material    PLAN:  Check glucose 4 times daily.  Check ketones once a week when readings are consistently negative.  Continue with recommended physical activity.  Continue to follow recommended meal plan: 2 carbs at breakfast, 4 carbs at lunch, 4 carbs at supper, 1-2 carbs at snacks.  Follow consistent CHO meal plan, eat CHO and protein/fat at all meals/snacks.    Call/e-mail/[email or web address] message diabetes educator if 3 or more blood sugars are above the goal in 1 week or if ketones are positive.    Time Spent: 15 minutes  Encounter Type: Individual    Any diabetes medication dose changes were made via the CDE Protocol and Collaborative Practice Agreement with the patient's referring provider. A copy of this encounter was shared with the provider.

## 2024-01-08 ENCOUNTER — PRENATAL OFFICE VISIT (OUTPATIENT)
Dept: OBGYN | Facility: CLINIC | Age: 34
End: 2024-01-08
Payer: COMMERCIAL

## 2024-01-08 VITALS
OXYGEN SATURATION: 96 % | DIASTOLIC BLOOD PRESSURE: 84 MMHG | HEART RATE: 99 BPM | BODY MASS INDEX: 32.84 KG/M2 | WEIGHT: 176.2 LBS | SYSTOLIC BLOOD PRESSURE: 138 MMHG

## 2024-01-08 DIAGNOSIS — Z34.00 SUPERVISION OF NORMAL FIRST PREGNANCY, ANTEPARTUM: Primary | ICD-10-CM

## 2024-01-08 DIAGNOSIS — O24.410 DIET CONTROLLED GESTATIONAL DIABETES MELLITUS (GDM) IN THIRD TRIMESTER: ICD-10-CM

## 2024-01-08 PROCEDURE — 90471 IMMUNIZATION ADMIN: CPT | Performed by: OBSTETRICS & GYNECOLOGY

## 2024-01-08 PROCEDURE — 99207 PR PRENATAL VISIT: CPT | Performed by: OBSTETRICS & GYNECOLOGY

## 2024-01-08 PROCEDURE — 90715 TDAP VACCINE 7 YRS/> IM: CPT | Performed by: OBSTETRICS & GYNECOLOGY

## 2024-01-08 NOTE — PROGRESS NOTES
Patient presents for routine prenatal visit at 29w1d.  Patient without complaint. Reports her sugars are well controlled  PE: See OB vitals  There is no height or weight on file to calculate BMI.    No vaginal bleeding, loss of fluid, or contractions  Questions asked and answered.  Plan growth ultrasound    Gómez Richardson MD FACOG

## 2024-01-08 NOTE — PROGRESS NOTES
Prior to immunization administration, verified patients identity using patient s name and date of birth. Please see Immunization Activity for additional information.     Screening Questionnaire for Adult Immunization    Are you sick today?   No   Do you have allergies to medications, food, a vaccine component or latex?   No   Have you ever had a serious reaction after receiving a vaccination?   No   Do you have a long-term health problem with heart, lung, kidney, or metabolic disease (e.g., diabetes), asthma, a blood disorder, no spleen, complement component deficiency, a cochlear implant, or a spinal fluid leak?  Are you on long-term aspirin therapy?   No   Do you have cancer, leukemia, HIV/AIDS, or any other immune system problem?   No   Do you have a parent, brother, or sister with an immune system problem?   No   In the past 3 months, have you taken medications that affect  your immune system, such as prednisone, other steroids, or anticancer drugs; drugs for the treatment of rheumatoid arthritis, Crohn s disease, or psoriasis; or have you had radiation treatments?   No   Have you had a seizure, or a brain or other nervous system problem?   No   During the past year, have you received a transfusion of blood or blood    products, or been given immune (gamma) globulin or antiviral drug?   No   For women: Are you pregnant or is there a chance you could become       pregnant during the next month?   Yes   Have you received any vaccinations in the past 4 weeks?   No     Immunization questionnaire was positive for at least one answer.  Third trimester immunization.      Patient instructed to remain in clinic for 15 minutes afterwards, and to report any adverse reactions.     Screening performed by Margarita Palacios CMA on 1/8/2024 at 8:39 AM.

## 2024-01-08 NOTE — PATIENT INSTRUCTIONS
Weeks 26 to 30 of Your Pregnancy: Care Instructions  You're starting your last trimester. You'll probably feel your baby moving around more. Your back may ache as your body gets used to your baby's size and length. Take care of yourself, and pay attention to what your body needs.    Talk to your doctor about getting the Tdap shot. It will help protect your  against whooping cough (pertussis). Also ask your doctor about flu and COVID-19 shots if you haven't had them yet. If your blood type is Rh negative, you may be given a shot of Rh immune globulin (such as RhoGAM). It can help prevent problems for your baby.   You may have Robeson-Lal contractions. They are single or several strong contractions without a pattern. These are practice contractions but not the start of labor.   Be kind to yourself.     Take breaks when you're tired.  Change positions often. Don't sit for too long or stand for too long.  At work, rest during breaks if you can. If you don't get breaks, talk to your doctor about writing a letter to your employer to request them.  Avoid fumes, chemicals, and tobacco smoke.  Be sexual if you want to.     You may be interested in sex, or you may not. Everyone is different.  Sex is okay unless your doctor tells you not to.  Your belly can make it hard to find good positions for sex. [place] and explore.  Watch for signs of  labor.    These signs include:   Menstrual-like cramps. Or you may have pain or pressure in your pelvis that happens in a pattern.  About 6 or more contractions in an hour (even after rest and a glass of water).  A low, dull backache that doesn't go away when you change positions.  An increase or change in vaginal discharge.  Light vaginal bleeding or spotting.  Your water breaking.  Know what to do if you think you are having contractions.     Drink 1 or 2 glasses of water.  Lie down on your left side for at least an hour.  While on your side, feel the top of your  "belly to see if it's tight.  Write down your contractions for an hour. Time how long it is from the start of one contraction to the start of the next.  Call your doctor if you have regular contractions.  Follow-up care is a key part of your treatment and safety. Be sure to make and go to all appointments, and call your doctor if you are having problems. It's also a good idea to know your test results and keep a list of the medicines you take.  Where can you learn more?  Go to https://www.StyleQ.net/patiented  Enter S999 in the search box to learn more about \"Weeks 26 to 30 of Your Pregnancy: Care Instructions.\"  Current as of: July 11, 2023               Content Version: 13.8    7732-5138 [employer].   Care instructions adapted under license by your healthcare professional. If you have questions about a medical condition or this instruction, always ask your healthcare professional. [employer] disclaims any warranty or liability for your use of this information.      Counting Your Baby's Kicks: Care Instructions  Overview     Counting your baby's kicks is one way your doctor can tell that your baby is healthy. You will probably feel your baby move for the first time between 16 and 22 weeks. The movement may feel like flutters rather than kicks. Your baby may move more at certain times of the day. When you are active, you may notice less kicking than when you are resting. At your prenatal visits, your doctor will ask whether the baby is active.  In your last trimester, your doctor may ask you to count the number of times you feel your baby move.  Follow-up care is a key part of your treatment and safety. Be sure to make and go to all appointments, and call your doctor if you are having problems. It's also a good idea to know your test results and keep a list of the medicines you take.  How do you count fetal kicks?  A common method of checking your baby's movement is to note the length " "of time it takes to count 10 movements (such as kicks, flutters, or rolls).  Pick your baby's most active time of day to count. This may be any time from morning to evening.  If you don't feel 10 movements in an hour, have something to eat or drink and count for another hour. If you don't feel at least 10 movements in the 2-hour period, call your doctor.  Do not use an at-home Doppler heart monitor in place of counting fetal movements.  When should you call for help?   Call your doctor now or seek immediate medical care if:    You feel fewer than 10 movements in a 2-hour period.     You noticed that your baby has stopped moving or is moving less than normal.   Watch closely for changes in your health, and be sure to contact your doctor if you have any problems.  Where can you learn more?  Go to https://www.TargetingMantra.Rage Frameworks/patiented  Enter U048 in the search box to learn more about \"Counting Your Baby's Kicks: Care Instructions.\"  Current as of: July 11, 2023               Content Version: 13.8    6039-2977 [employer].   Care instructions adapted under license by your healthcare professional. If you have questions about a medical condition or this instruction, always ask your healthcare professional. [employer] disclaims any warranty or liability for your use of this information.      Using Nitrous Oxide During Labor  What is nitrous oxide?  Nitrous oxide is a mixture of 50% nitrous oxide gas and 50% oxygen that is inhaled through a mask. Nitrous oxide is better known for use in dental offices or before surgery to help patients relax. Most people know of it as \"laughing gas.\"   How do I use it during labor?  You hold your own mask and begin to inhale the gas mixture about 30 seconds before a contraction begins. Breathing before and during a contraction helps the gas work the best right at the peak of the contraction, providing the greatest relief. It may take 3 to 4 contractions to get used " to the rhythm of breathing the nitrous oxide.   Does nitrous oxide have any side effects?  Some women have reported dizziness, feeling sleepy, dry mouth and nausea (feeling sick to your stomach) with use of nitrous oxide. These side effects often decrease over time. Medicine is available to help ease nausea if it is a concern for you.   Is any extra monitoring required for me or my baby?  No. Your monitoring will not need to change just because you are using nitrous oxide.   Can I still be out of bed and use nitrous oxide?  Yes. Women sometimes feel dizzy when using nitrous oxide. For this reason, you will begin using nitrous oxide while in a bed or chair. If you feel okay, you may get up and walk or use a birthing ball or stool. It will be important that you have someone in the room close by for support.   Can I use nitrous oxide and have IV pain medicines at the same time?  No. The combination of narcotics (injectable pain medications) and nitrous oxide can slow your breathing, so it is not safe for them to be used together. You may use nitrous oxide before receiving an epidural or IV pain medication.  If I use nitrous oxide can I still get an epidural?  Yes. You may wish to use nitrous oxide until you are ready for an epidural. Nitrous oxide can be less effective than an epidural in reducing labor pain. If an epidural is part of your birth plan, it is important that you get your epidural while you are still able to sit for safe placement.   Are there reasons I couldn't use nitrous oxide?   Yes. You cannot use nitrous oxide if you:  Cannot hold your own face mask.  Have received a dose of narcotic in the past few hours (your nurse will discuss this with you).  Have a documented B12 deficiency.  Have one of a very few other rare medical conditions.  Can my labor support person help me with my nitrous oxide?  No! Only you can administer nitrous oxide to yourself. Part of the built-in safety of nitrous oxide is that  you hold your own mask. If anyone in the room is found to be handling the nitrous oxide equipment other than you or your nurse, the nitrous oxide will be removed.  Can I use nitrous oxide with other procedures?   Yes. If nitrous oxide is right for you and your provider agrees, it may be used in these situations:  During the repair of a laceration or episiotomy  Manual removal of your placenta  Blood draws  IV placement  For informational purposes only. Not to replace the advice of your health care provider. Copyright   2019 [facility]. All rights reserved. Clinically reviewed by  System Operations Leadership APNL Team. Tinybop 425278 - Rev .                                                       If you have any questions regarding your visit, Please contact your care team.     [employer]: [phone]    To Schedule an Appointment   Call: [phone] HOURS TELEPHONE NUMBER     Gómez Richardson MD  Medical Director        Sharona-BRENT Brooks-BRENT Dietz-Surgery Scheduler  Bonny-Surgery Scheduler               Tuesday-  7:30 a.m-4:30 p.m    Thursday-[place]  7:30 a.m-4:30 p.m    Typical Surgery Days: Tuesday or Friday [facility]  [address] [place] [address]  PH: [phone]    Imaging Scheduling all locations  PH: [phone]     [facility] Labor and Delivery  [address] [place] [address] [place] [address]  PH: [phone]    [facility]  [address] [place] [address]  PH: [phone] Fax      **Surgeries** Our Surgery Schedulers will contact you to schedule. If you do not receive a call within 3 business days, please call [phone].    Urgent Care locations:  [facility] Monday-Friday  10 am - 8 pm  Saturday and    9 am - 5 pm  Monday-Friday   10 am - 8 pm  Saturday and    9 am - 5 pm   [phone]   If you need a medication refill, please contact your pharmacy. Please allow 3 business days for your refill to be completed.  As always, Thank you for trusting us with your healthcare needs!    see additional instructions from your care team below

## 2024-01-11 ENCOUNTER — MYC MEDICAL ADVICE (OUTPATIENT)
Dept: EDUCATION SERVICES | Facility: CLINIC | Age: 34
End: 2024-01-11
Payer: COMMERCIAL

## 2024-01-11 DIAGNOSIS — O24.410 DIET CONTROLLED GESTATIONAL DIABETES MELLITUS (GDM) IN THIRD TRIMESTER: Primary | ICD-10-CM

## 2024-01-11 RX ORDER — PEN NEEDLE, DIABETIC 30 GX3/16"
1 NEEDLE, DISPOSABLE MISCELLANEOUS DAILY
Qty: 100 EACH | Refills: 1 | Status: ON HOLD | OUTPATIENT
Start: 2024-01-11 | End: 2024-02-26

## 2024-01-11 RX ORDER — INSULIN HUMAN 100 [IU]/ML
12 INJECTION, SUSPENSION SUBCUTANEOUS AT BEDTIME
Qty: 15 ML | Refills: 3 | Status: SHIPPED | OUTPATIENT
Start: 2024-01-11 | End: 2024-01-11

## 2024-01-11 RX ORDER — PEN NEEDLE, DIABETIC 30 GX3/16"
1 NEEDLE, DISPOSABLE MISCELLANEOUS DAILY
Qty: 100 EACH | Refills: 1 | Status: SHIPPED | OUTPATIENT
Start: 2024-01-11 | End: 2024-01-11

## 2024-01-11 RX ORDER — BLOOD PRESSURE TEST KIT
1 KIT MISCELLANEOUS DAILY
Qty: 100 EACH | Refills: 4 | Status: ON HOLD | OUTPATIENT
Start: 2024-01-11 | End: 2024-02-26

## 2024-01-11 RX ORDER — BLOOD PRESSURE TEST KIT
1 KIT MISCELLANEOUS DAILY
Qty: 100 EACH | Refills: 4 | Status: SHIPPED | OUTPATIENT
Start: 2024-01-11 | End: 2024-01-11

## 2024-01-11 RX ORDER — INSULIN HUMAN 100 [IU]/ML
12 INJECTION, SUSPENSION SUBCUTANEOUS AT BEDTIME
Qty: 15 ML | Refills: 3 | Status: ON HOLD | OUTPATIENT
Start: 2024-01-11 | End: 2024-02-26

## 2024-01-11 NOTE — TELEPHONE ENCOUNTER
Gestational Diabetes Follow-up    Subjective/Objective:    Tere Alves sent in blood glucose log for review. Last date of communication was: 1/4.    Gestational diabetes is being managed with diet and activity    Taking diabetes medications: No      Estimated Date of Delivery: Mar 24, 2024    BG/Food Log:       Assessment:  50% of fasting BG elevated, will start NPH. Also post lunch/dinner elevated but not yet at threshold for meal time insulin. Pt reports rice causes BG to be elevated and she is working on trying other options.     Ketones: trace.   Fasting blood glucoses: 50% in target.  After breakfast: 88% in target.  Before lunch: -% in target.  After lunch: 57% in target.  Before dinner: -% in target.  After dinner: 57% in target.    Plan/Response:  -Recommend that patient begin NPH insulin- 12 units (.15u/kg), can increase by 2 units every 2 days until fasting BG <95  -Continue to monitor ketones weekly  -Follow-up in 1 week      Glenda Velasco RD, RICHARD, [facility]      Any diabetes medication dose changes were made via the CDE Protocol and Collaborative Practice Agreement with the patient's OB/GYN provider. A copy of this encounter was shared with the provider.

## 2024-01-15 ENCOUNTER — ANCILLARY PROCEDURE (OUTPATIENT)
Dept: ULTRASOUND IMAGING | Facility: CLINIC | Age: 34
End: 2024-01-15
Attending: OBSTETRICS & GYNECOLOGY
Payer: COMMERCIAL

## 2024-01-15 DIAGNOSIS — O24.410 DIET CONTROLLED GESTATIONAL DIABETES MELLITUS (GDM) IN THIRD TRIMESTER: ICD-10-CM

## 2024-01-15 PROCEDURE — 76816 OB US FOLLOW-UP PER FETUS: CPT | Mod: TC | Performed by: RADIOLOGY

## 2024-01-28 ENCOUNTER — MYC REFILL (OUTPATIENT)
Dept: EDUCATION SERVICES | Facility: CLINIC | Age: 34
End: 2024-01-28
Payer: COMMERCIAL

## 2024-01-28 DIAGNOSIS — O24.410 DIET CONTROLLED GESTATIONAL DIABETES MELLITUS (GDM), ANTEPARTUM: ICD-10-CM

## 2024-01-31 ENCOUNTER — MYC REFILL (OUTPATIENT)
Dept: EDUCATION SERVICES | Facility: CLINIC | Age: 34
End: 2024-01-31
Payer: COMMERCIAL

## 2024-01-31 DIAGNOSIS — O24.410 DIET CONTROLLED GESTATIONAL DIABETES MELLITUS (GDM), ANTEPARTUM: ICD-10-CM

## 2024-01-31 NOTE — PATIENT INSTRUCTIONS
"Weeks 32 to 34 of Your Pregnancy: Care Instructions    Decide whether you want to bank or donate your baby's umbilical cord blood. If you want to save this blood, you have to arrange for it ahead of time.   Decide about circumcision. Personal, Samaritan, or cultural beliefs may play a role in your decision. You get to decide what you want for your baby.     Learn how to ease hemorrhoids.    Get more liquids, fruits, vegetables, and fiber in your diet.  Avoid sitting for too long.  Clean yourself with moist toilet paper. Or try witch hazel pads.  Try ice packs or warm sitz baths for discomfort.  Use hydrocortisone cream for pain or itching.  Ask your doctor about stool softeners.    Consider the benefits of breastfeeding.    It reduces your baby's risk of sudden infant death syndrome (SIDS).   babies are less likely to get certain infections. And they're less likely to be obese or get diabetes later in life.  It can lower your risk of breast and ovarian cancers and osteoporosis.  It saves you money.  Follow-up care is a key part of your treatment and safety. Be sure to make and go to all appointments, and call your doctor if you are having problems. It's also a good idea to know your test results and keep a list of the medicines you take.  Where can you learn more?  Go to https://www.GREE.net/patiented  Enter X711 in the search box to learn more about \"Weeks 32 to 34 of Your Pregnancy: Care Instructions.\"  Current as of: July 11, 2023               Content Version: 13.8    0461-5842 [employer].   Care instructions adapted under license by your healthcare professional. If you have questions about a medical condition or this instruction, always ask your healthcare professional. [employer] disclaims any warranty or liability for your use of this information.      You have been provided the Any Day Now: Early Labor at Home document.    Additional copies can be found here:  " [email or web address]  You have been provided the What I'd Wish I'd Known About Giving Birth document.    Additional copies can be found here:  [email or web address]                                                         If you have any questions regarding your visit, Please contact your care team.     [employer]: [phone]  To Schedule an Appointment 24/7  Call: [phone] HOURS TELEPHONE NUMBER     Barbara Lesley- HEATHER Tabor-BRENT Brooks-BRENT Jeff-BRENT Dietz-Surgery Scheduler  Bonny-Surgery Scheduler         Monday 7:30am-2:00pm    Tuesday 7:30am-4:00pm    Wednesday 7:30am-2:00pm    Thursday 7:30am-11:00am    Friday 7:30am-2:00pm [address] [address]  Phone- [phone]   Fax- [phone]     Imaging Scheduling all locations  [phone]    [facility] Labor and Delivery  [address] [place] [address]   [place] [address] [phone]         Urgent Care locations:  [facility]   Monday-Friday  10 am - 8 pm  Saturday and Sunday   9 am - 5 pm     [phone]   If you need a medication refill, please contact your pharmacy. Please allow 3 business days for your refill to be completed.  As always, Thank you for trusting us with your healthcare needs!      see additional instructions from your care team below

## 2024-02-01 ENCOUNTER — PRENATAL OFFICE VISIT (OUTPATIENT)
Dept: OBGYN | Facility: CLINIC | Age: 34
End: 2024-02-01
Payer: COMMERCIAL

## 2024-02-01 VITALS
HEART RATE: 85 BPM | DIASTOLIC BLOOD PRESSURE: 78 MMHG | WEIGHT: 178 LBS | BODY MASS INDEX: 33.61 KG/M2 | HEIGHT: 61 IN | SYSTOLIC BLOOD PRESSURE: 119 MMHG | OXYGEN SATURATION: 97 %

## 2024-02-01 DIAGNOSIS — O24.414 INSULIN CONTROLLED GESTATIONAL DIABETES MELLITUS (GDM) IN THIRD TRIMESTER: Primary | ICD-10-CM

## 2024-02-01 DIAGNOSIS — O09.529 ANTEPARTUM MULTIGRAVIDA OF ADVANCED MATERNAL AGE: ICD-10-CM

## 2024-02-01 PROCEDURE — 99207 PR PRENATAL VISIT: CPT | Performed by: NURSE PRACTITIONER

## 2024-02-01 NOTE — PROGRESS NOTES
Patient presents for routine prenatal visit. Prenatal flowsheet reviewed and updated as needed.  Denies vaginal bleeding, loss of fluid, contractions or cramping. Denies headache, nausea/vomiting, upper abdominal pain, vision changes, lower extremity swelling, chest pain or shortness of breath.     Anticipatory guidance appropriate for gestational age reviewed.  PE: See OB vitals    Pregnancy complicated by:  AMA-CfDNA normal, level 2 done  A2GDM-growth ultrasound at 30 weeeks-EFW 55th percentile. Discussed  surveillance with BPP and discussed rationale, frequency. Scheduling information given and they prefer to call to set up so they can align with their schedule.    Questions asked and answered. Next OB visit in 2 week(s) with OB Physician.    Barbara ROMERO CNP

## 2024-02-02 NOTE — TELEPHONE ENCOUNTER
This was a duplicate request by mistake. RN closing encounter.    Sharona Monk RN on 2/2/2024 at 8:27 AM

## 2024-02-05 ENCOUNTER — ANCILLARY PROCEDURE (OUTPATIENT)
Dept: ULTRASOUND IMAGING | Facility: CLINIC | Age: 34
End: 2024-02-05
Attending: NURSE PRACTITIONER
Payer: COMMERCIAL

## 2024-02-05 DIAGNOSIS — O24.414 INSULIN CONTROLLED GESTATIONAL DIABETES MELLITUS (GDM) IN THIRD TRIMESTER: ICD-10-CM

## 2024-02-05 PROCEDURE — 76819 FETAL BIOPHYS PROFIL W/O NST: CPT | Performed by: STUDENT IN AN ORGANIZED HEALTH CARE EDUCATION/TRAINING PROGRAM

## 2024-02-08 ENCOUNTER — ANCILLARY PROCEDURE (OUTPATIENT)
Dept: ULTRASOUND IMAGING | Facility: CLINIC | Age: 34
End: 2024-02-08
Attending: NURSE PRACTITIONER
Payer: COMMERCIAL

## 2024-02-08 DIAGNOSIS — O24.414 INSULIN CONTROLLED GESTATIONAL DIABETES MELLITUS (GDM) IN THIRD TRIMESTER: ICD-10-CM

## 2024-02-08 PROCEDURE — 76819 FETAL BIOPHYS PROFIL W/O NST: CPT | Performed by: RADIOLOGY

## 2024-02-09 ENCOUNTER — MYC MEDICAL ADVICE (OUTPATIENT)
Dept: EDUCATION SERVICES | Facility: CLINIC | Age: 34
End: 2024-02-09
Payer: COMMERCIAL

## 2024-02-09 DIAGNOSIS — O24.410 DIET CONTROLLED GESTATIONAL DIABETES MELLITUS (GDM), ANTEPARTUM: Primary | ICD-10-CM

## 2024-02-09 NOTE — TELEPHONE ENCOUNTER
Gestational Diabetes Follow-up    Subjective/Objective:    Tere Alves sent in blood glucose log for review. Last date of communication was: 1/31/24.    Gestational diabetes is being managed with diet, activity, and medications    Taking diabetes medications: yes:     Diabetes Medication(s)       Insulin       insulin NPH (HUMULIN N KWIKPEN) 100 UNIT/ML injection Inject 12 Units Subcutaneous at bedtime If fasting BG 95 or greater x 2 days, increase dose by 2u every 2 days until BG <95            Estimated Date of Delivery: Mar 24, 2024, 33w5d    BG/Food Log:         Assessment:  Fasting blood sugar in target.  Inconsistent post meal elevations.  Not all readings are close to the top end of the target which indicates variability in what she is eating.  Could request she track for a few days and then follow up.  Blood sugars are similar to last week, so not necessarily increasing weekly.     Ketones: none reported.   Fasting blood glucoses: 100% in target.  After breakfast: 100% in target.  Before lunch: -% in target.  After lunch: 75% in target.  Before dinner: -% in target.  After dinner: 63% in target.    Plan/Response:  Keep a food record for the next follow-up.  No changes in the patient's current treatment plan.  Follow-up in 3-4 days.    Kasia Enriquez MS, RD, LD, CDE    Any diabetes medication dose changes were made via the CDE Protocol and Collaborative Practice Agreement with the patient's OB/GYN provider. A copy of this encounter was shared with the provider.

## 2024-02-12 ENCOUNTER — ANCILLARY PROCEDURE (OUTPATIENT)
Dept: ULTRASOUND IMAGING | Facility: CLINIC | Age: 34
End: 2024-02-12
Attending: NURSE PRACTITIONER
Payer: COMMERCIAL

## 2024-02-12 DIAGNOSIS — O24.414 INSULIN CONTROLLED GESTATIONAL DIABETES MELLITUS (GDM) IN THIRD TRIMESTER: ICD-10-CM

## 2024-02-12 PROCEDURE — 76819 FETAL BIOPHYS PROFIL W/O NST: CPT | Performed by: RADIOLOGY

## 2024-02-13 RX ORDER — INSULIN ASPART 100 [IU]/ML
8 INJECTION, SOLUTION INTRAVENOUS; SUBCUTANEOUS
Qty: 15 ML | Refills: 1 | Status: SHIPPED | OUTPATIENT
Start: 2024-02-13 | End: 2024-02-23

## 2024-02-13 NOTE — TELEPHONE ENCOUNTER
Gestational Diabetes Follow-up    Subjective/Objective:    Tere Alves sent in blood glucose log for review. Last date of communication was: 2/9.    Gestational diabetes is being managed with diet, activity, and medications    Taking diabetes medications: yes:     Diabetes Medication(s)       Insulin       insulin NPH (HUMULIN N KWIKPEN) 100 UNIT/ML injection Inject 12 Units Subcutaneous at bedtime If fasting BG 95 or greater x 2 days, increase dose by 2u every 2 days until BG <95            Estimated Date of Delivery: Mar 24, 2024    BG/Food Log:       Assessment: Assessed from 2/9 which was last time Tere sent in numbers. Post-dinner BG are above goals, will start mealtime insulin.   Ketones: trace.   Fasting blood glucoses: 100% in target.  After breakfast: 75% in target.  Before lunch: -% in target.  After lunch: 100% in target.  Before dinner: -% in target.  After dinner: 25% in target.    Plan/Response:  Recommend that patient begin Novolog 8u insulin.    Glenda Velasco RD, RICHARD, [facility]      Any diabetes medication dose changes were made via the CDE Protocol and Collaborative Practice Agreement with the patient's OB/GYN provider. A copy of this encounter was shared with the provider.

## 2024-02-14 NOTE — PROGRESS NOTES
Patient presents for routine prenatal visit. Prenatal flowsheet reviewed and updated as needed.  Denies vaginal bleeding, loss of fluid, contractions or cramping. Denies headache, nausea/vomiting, upper abdominal pain, vision changes, lower extremity swelling, chest pain or shortness of breath.     Anticipatory guidance appropriate for gestational age reviewed.  PE: See OB vitals    Pregnancy complicated by:  AMA-CfDNA normal, level 2 done  A2GDM-growth ultrasound at 30 weeeks-EFW 55th percentile. Has been doing twice weekly BPP. Plan growth in the next week-additional order entered.   Insulin added this week just prior to dinner. Has not yet started-will  today. Continue close follow up with Diabetes Education team.    Routine prenatal care:  GBS at 36 weeks    Questions asked and answered. Next OB visit in 1 week(s) with OB Physician.    Barbara ROMERO CNP

## 2024-02-14 NOTE — PATIENT INSTRUCTIONS
Weeks 34 to 36 of Your Pregnancy: Care Instructions  Your belly has grown quite large. It's almost time to give birth! Your baby's lungs are almost ready to breathe air. The skull bones are firm enough to protect your baby's head. But they're soft enough to move down through the birth canal.    You might be wondering what to expect during labor. Because each birth is different, there's no way to know exactly what childbirth will be like for you. Talk to your doctor or midwife about any concerns you have.   You'll probably have a test for group B streptococcus (GBS). GBS is bacteria that can live in the vagina and rectum. GBS can make your baby sick after birth. If you test positive, you'll get antibiotics during labor.     Choose what type of pain relief you would like during labor.  You can choose from a few types, including medicine and non-medicine options. You may want to use several types of pain relief.     Know how medicines can help with pain during labor.  Some medicines lower anxiety and help with some of the pain. Others make your lower body numb so that you will feel less pain.     Tell your doctor about your pain medicine choice.  Do this before you start labor or very early in your labor. You may be able to change your mind during labor.     Learn about the stages of labor.    The first stage includes the early (latent) and active phases of labor. Contractions start in early labor. During active labor, contractions get stronger, last longer, and happen more often. And the cervix opens more rapidly.  The second stage starts when you're ready to push. During this stage, your baby is born.  During the third stage, you'll usually have a few more contractions to push out the placenta.   Follow-up care is a key part of your treatment and safety. Be sure to make and go to all appointments, and call your doctor if you are having problems. It's also a good idea to know your test results and keep a list of the  "medicines you take.  Where can you learn more?  Go to https://www.[employer].net/patiented  Enter B912 in the search box to learn more about \"Weeks 34 to 36 of Your Pregnancy: Care Instructions.\"  Current as of: 2023               Content Version: 13.8    7674-1347 [employer].   Care instructions adapted under license by your healthcare professional. If you have questions about a medical condition or this instruction, always ask your healthcare professional. [employer] disclaims any warranty or liability for your use of this information.      Group B Strep During Pregnancy: Care Instructions  Overview     Group B strep infection is caused by a type of bacteria. It's a different kind of bacteria than the kind that causes strep throat.  You may have this kind of bacteria in your body. Sometimes it may cause an infection, but most of the time it doesn't make you sick or cause symptoms. But if you pass the bacteria to your baby during the birth, it can cause serious health problems for your baby.  If you have this bacteria in your body, you will get antibiotics when you are in labor. Antibiotics help prevent problems for a  baby.  After birth, doctors will watch and may test your baby. If your baby tests positive for Group B strep, your baby will get antibiotics.  If you plan to breastfeed your baby, don't worry. It will be safe to breastfeed.  Follow-up care is a key part of your treatment and safety. Be sure to make and go to all appointments, and call your doctor if you are having problems. It's also a good idea to know your test results and keep a list of the medicines you take.  How can you care for yourself at home?  If your doctor has prescribed antibiotics, take them as directed. Do not stop taking them just because you feel better. You need to take the full course of antibiotics.  Tell your doctor if you are allergic to any antibiotic.  If you go into labor, or your " "water breaks, go to the hospital. Your doctor will give you antibiotics to help protect your baby from infection.  Tell the doctors and nurses if you have an allergy to penicillin.  Tell the doctors and nurses at the hospital that you tested positive for group B strep.  When should you call for help?   Call your doctor now or seek immediate medical care if:    You have symptoms of a urinary tract infection. These may include:  Pain or burning when you urinate.  A frequent need to urinate without being able to pass much urine.  Pain in the flank, which is just below the rib cage and above the waist on either side of the back.  Blood in your urine.  A fever.     You think you are in labor or your water has broken.     You have pain in your belly or pelvis.   Watch closely for changes in your health, and be sure to contact your doctor if you have any problems.  Where can you learn more?  Go to https://www.[employer].net/patiented  Enter M001 in the search box to learn more about \"Group B Strep During Pregnancy: Care Instructions.\"  Current as of: June 13, 2023               Content Version: 13.8    5261-8673 [employer].   Care instructions adapted under license by your healthcare professional. If you have questions about a medical condition or this instruction, always ask your healthcare professional. [employer] disclaims any warranty or liability for your use of this information.                                                       If you have any questions regarding your visit, Please contact your care team.     What's Trending Access Services: [phone]  To Schedule an Appointment 24/7  Call: [phone]LEPHONE NUM[facility]     Barbara Dietz-Surgery Scheduler  Bonny-Surgery Scheduler         Monday 7:30am-2:00pm    Tuesday 7:30am-4:00pm    Wednesday 7:30am-2:00pm    Thursday " 7:30am-11:00am    Friday 7:30am-2:00pm [facility]  [address] [place] [address]  Phone- [phone]   Fax- [phone]     Imaging Scheduling all locations  [phone]    [facility]  [address] [place] [address] [place] [address]  [phone]         Urgent Care locations:  [facility]   Monday-Friday  10 am - 8 pm  Saturday and Sunday   9 am - 5 pm     [phone]   If you need a medication refill, please contact your pharmacy. Please allow 3 business days for your refill to be completed.  As always, Thank you for trusting us with your healthcare needs!      see additional instructions from your care team below

## 2024-02-15 ENCOUNTER — ANCILLARY PROCEDURE (OUTPATIENT)
Dept: ULTRASOUND IMAGING | Facility: CLINIC | Age: 34
End: 2024-02-15
Attending: NURSE PRACTITIONER
Payer: COMMERCIAL

## 2024-02-15 ENCOUNTER — PRENATAL OFFICE VISIT (OUTPATIENT)
Dept: OBGYN | Facility: CLINIC | Age: 34
End: 2024-02-15
Payer: COMMERCIAL

## 2024-02-15 VITALS
OXYGEN SATURATION: 98 % | SYSTOLIC BLOOD PRESSURE: 121 MMHG | HEIGHT: 61 IN | HEART RATE: 92 BPM | WEIGHT: 181.2 LBS | BODY MASS INDEX: 34.21 KG/M2 | DIASTOLIC BLOOD PRESSURE: 82 MMHG

## 2024-02-15 DIAGNOSIS — O09.529 ANTEPARTUM MULTIGRAVIDA OF ADVANCED MATERNAL AGE: ICD-10-CM

## 2024-02-15 DIAGNOSIS — O24.414 INSULIN CONTROLLED GESTATIONAL DIABETES MELLITUS (GDM) IN THIRD TRIMESTER: ICD-10-CM

## 2024-02-15 DIAGNOSIS — O24.414 INSULIN CONTROLLED GESTATIONAL DIABETES MELLITUS (GDM) IN THIRD TRIMESTER: Primary | ICD-10-CM

## 2024-02-15 PROCEDURE — 76819 FETAL BIOPHYS PROFIL W/O NST: CPT

## 2024-02-15 PROCEDURE — 99207 PR PRENATAL VISIT: CPT | Performed by: NURSE PRACTITIONER

## 2024-02-15 NOTE — LETTER
RE: Tere Alves  : 1990    DATE: February 15, 2024      To Whom It May Concern,        Tere Alves is under our care for her pregnancy. She is currently 34 weeks gestation with an estimated due date of 3/24/24. Due to complications with the pregnancy, she will likely be induced during her 39th week of the pregnancy.         Thank you.        Sincerely,            Barbara ROMERO CNP

## 2024-02-19 ENCOUNTER — ANCILLARY PROCEDURE (OUTPATIENT)
Dept: ULTRASOUND IMAGING | Facility: CLINIC | Age: 34
End: 2024-02-19
Attending: NURSE PRACTITIONER
Payer: COMMERCIAL

## 2024-02-19 ENCOUNTER — TELEPHONE (OUTPATIENT)
Dept: OBGYN | Facility: CLINIC | Age: 34
End: 2024-02-19

## 2024-02-19 DIAGNOSIS — O24.414 INSULIN CONTROLLED GESTATIONAL DIABETES MELLITUS (GDM) IN THIRD TRIMESTER: ICD-10-CM

## 2024-02-19 PROCEDURE — 76819 FETAL BIOPHYS PROFIL W/O NST: CPT | Mod: TC | Performed by: STUDENT IN AN ORGANIZED HEALTH CARE EDUCATION/TRAINING PROGRAM

## 2024-02-19 NOTE — TELEPHONE ENCOUNTER
Prior Authorization Retail Medication Request    Medication/Dose: Novolog Flexpen 100 unit/ML inj  Diagnosis and ICD code (if different than what is on RX):  Diet controlled gestational diabetes mellitus, antepartum O24.410  New/renewal/insurance change PA/secondary ins. PA:  Previously Tried and Failed:  N/A  Rationale:    Insurance   Primary: [facility]  Insurance ID:  [id number]    Secondary (if applicable):  Insurance ID:      Pharmacy Information (if different than what is on RX)  Name:  [id number]  Phone:  [phone]  Fax:[phone]

## 2024-02-22 ENCOUNTER — ANCILLARY PROCEDURE (OUTPATIENT)
Dept: ULTRASOUND IMAGING | Facility: CLINIC | Age: 34
End: 2024-02-22
Attending: NURSE PRACTITIONER
Payer: COMMERCIAL

## 2024-02-22 DIAGNOSIS — O24.414 INSULIN CONTROLLED GESTATIONAL DIABETES MELLITUS (GDM) IN THIRD TRIMESTER: ICD-10-CM

## 2024-02-22 PROCEDURE — 76819 FETAL BIOPHYS PROFIL W/O NST: CPT | Mod: TC | Performed by: RADIOLOGY

## 2024-02-23 ENCOUNTER — TELEPHONE (OUTPATIENT)
Dept: OBGYN | Facility: CLINIC | Age: 34
End: 2024-02-23
Payer: COMMERCIAL

## 2024-02-23 ENCOUNTER — MYC MEDICAL ADVICE (OUTPATIENT)
Dept: EDUCATION SERVICES | Facility: CLINIC | Age: 34
End: 2024-02-23
Payer: COMMERCIAL

## 2024-02-23 ENCOUNTER — HOSPITAL ENCOUNTER (INPATIENT)
Facility: CLINIC | Age: 34
LOS: 4 days | Discharge: HOME HEALTH | End: 2024-02-27
Attending: OBSTETRICS & GYNECOLOGY | Admitting: OBSTETRICS & GYNECOLOGY
Payer: COMMERCIAL

## 2024-02-23 DIAGNOSIS — O42.919 PRETERM PREMATURE RUPTURE OF MEMBRANES (PPROM) DELIVERED, CURRENT HOSPITALIZATION: Primary | ICD-10-CM

## 2024-02-23 DIAGNOSIS — O09.513 AMA (ADVANCED MATERNAL AGE) PRIMIGRAVIDA 35+, THIRD TRIMESTER: ICD-10-CM

## 2024-02-23 DIAGNOSIS — O24.410 DIET CONTROLLED GESTATIONAL DIABETES MELLITUS (GDM), ANTEPARTUM: ICD-10-CM

## 2024-02-23 DIAGNOSIS — O99.210 OBESITY IN PREGNANCY: ICD-10-CM

## 2024-02-23 DIAGNOSIS — O24.419 GDM, CLASS A2: ICD-10-CM

## 2024-02-23 DIAGNOSIS — Z75.8 DOES NOT HAVE PRIMARY CARE PROVIDER: ICD-10-CM

## 2024-02-23 PROBLEM — Z36.89 ENCOUNTER FOR TRIAGE IN PREGNANT PATIENT: Status: ACTIVE | Noted: 2024-02-23

## 2024-02-23 LAB
ABO/RH(D): NORMAL
ANTIBODY SCREEN: NEGATIVE
ERYTHROCYTE [DISTWIDTH] IN BLOOD BY AUTOMATED COUNT: 15.2 % (ref 10–15)
GLUCOSE BLDC GLUCOMTR-MCNC: 122 MG/DL (ref 70–99)
GLUCOSE SERPL-MCNC: 114 MG/DL (ref 70–99)
HCT VFR BLD AUTO: 36.9 % (ref 35–47)
HGB BLD-MCNC: 12.5 G/DL (ref 11.7–15.7)
MCH RBC QN AUTO: 25.9 PG (ref 26.5–33)
MCHC RBC AUTO-ENTMCNC: 33.9 G/DL (ref 31.5–36.5)
MCV RBC AUTO: 76 FL (ref 78–100)
PLATELET # BLD AUTO: 152 10E3/UL (ref 150–450)
RBC # BLD AUTO: 4.83 10E6/UL (ref 3.8–5.2)
RUPTURE OF FETAL MEMBRANES BY ROM PLUS: POSITIVE
SPECIMEN EXPIRATION DATE: NORMAL
WBC # BLD AUTO: 7.4 10E3/UL (ref 4–11)

## 2024-02-23 PROCEDURE — 99207 PR PRENATAL VISIT: CPT | Performed by: OBSTETRICS & GYNECOLOGY

## 2024-02-23 PROCEDURE — 86780 TREPONEMA PALLIDUM: CPT | Performed by: OBSTETRICS & GYNECOLOGY

## 2024-02-23 PROCEDURE — G0463 HOSPITAL OUTPT CLINIC VISIT: HCPCS

## 2024-02-23 PROCEDURE — 250N000012 HC RX MED GY IP 250 OP 636 PS 637: Performed by: OBSTETRICS & GYNECOLOGY

## 2024-02-23 PROCEDURE — 84112 EVAL AMNIOTIC FLUID PROTEIN: CPT | Performed by: OBSTETRICS & GYNECOLOGY

## 2024-02-23 PROCEDURE — 120N000001 HC R&B MED SURG/OB

## 2024-02-23 PROCEDURE — 82947 ASSAY GLUCOSE BLOOD QUANT: CPT | Performed by: OBSTETRICS & GYNECOLOGY

## 2024-02-23 PROCEDURE — 85014 HEMATOCRIT: CPT | Performed by: OBSTETRICS & GYNECOLOGY

## 2024-02-23 PROCEDURE — 250N000013 HC RX MED GY IP 250 OP 250 PS 637: Performed by: OBSTETRICS & GYNECOLOGY

## 2024-02-23 PROCEDURE — 36415 COLL VENOUS BLD VENIPUNCTURE: CPT | Performed by: OBSTETRICS & GYNECOLOGY

## 2024-02-23 PROCEDURE — 86900 BLOOD TYPING SEROLOGIC ABO: CPT | Performed by: OBSTETRICS & GYNECOLOGY

## 2024-02-23 RX ORDER — KETOROLAC TROMETHAMINE 30 MG/ML
30 INJECTION, SOLUTION INTRAMUSCULAR; INTRAVENOUS
Status: DISCONTINUED | OUTPATIENT
Start: 2024-02-23 | End: 2024-02-25 | Stop reason: HOSPADM

## 2024-02-23 RX ORDER — MISOPROSTOL 100 UG/1
25 TABLET ORAL
Status: DISCONTINUED | OUTPATIENT
Start: 2024-02-23 | End: 2024-02-24

## 2024-02-23 RX ORDER — NICOTINE POLACRILEX 4 MG
15-30 LOZENGE BUCCAL
Status: DISCONTINUED | OUTPATIENT
Start: 2024-02-23 | End: 2024-02-24

## 2024-02-23 RX ORDER — IBUPROFEN 400 MG/1
800 TABLET, FILM COATED ORAL
Status: DISCONTINUED | OUTPATIENT
Start: 2024-02-23 | End: 2024-02-25 | Stop reason: HOSPADM

## 2024-02-23 RX ORDER — LIDOCAINE 40 MG/G
CREAM TOPICAL
Status: DISCONTINUED | OUTPATIENT
Start: 2024-02-23 | End: 2024-02-23

## 2024-02-23 RX ORDER — LOPERAMIDE HCL 2 MG
4 CAPSULE ORAL
Status: DISCONTINUED | OUTPATIENT
Start: 2024-02-23 | End: 2024-02-25 | Stop reason: HOSPADM

## 2024-02-23 RX ORDER — ONDANSETRON 4 MG/1
4 TABLET, ORALLY DISINTEGRATING ORAL EVERY 6 HOURS PRN
Status: DISCONTINUED | OUTPATIENT
Start: 2024-02-23 | End: 2024-02-25 | Stop reason: HOSPADM

## 2024-02-23 RX ORDER — DEXTROSE MONOHYDRATE 25 G/50ML
25-50 INJECTION, SOLUTION INTRAVENOUS
Status: DISCONTINUED | OUTPATIENT
Start: 2024-02-23 | End: 2024-02-24

## 2024-02-23 RX ORDER — METOCLOPRAMIDE 10 MG/1
10 TABLET ORAL EVERY 6 HOURS PRN
Status: DISCONTINUED | OUTPATIENT
Start: 2024-02-23 | End: 2024-02-25 | Stop reason: HOSPADM

## 2024-02-23 RX ORDER — OXYTOCIN/0.9 % SODIUM CHLORIDE 30/500 ML
340 PLASTIC BAG, INJECTION (ML) INTRAVENOUS CONTINUOUS PRN
Status: DISCONTINUED | OUTPATIENT
Start: 2024-02-23 | End: 2024-02-25 | Stop reason: HOSPADM

## 2024-02-23 RX ORDER — ACETAMINOPHEN 325 MG/1
650 TABLET ORAL EVERY 4 HOURS PRN
Status: DISCONTINUED | OUTPATIENT
Start: 2024-02-23 | End: 2024-02-25 | Stop reason: HOSPADM

## 2024-02-23 RX ORDER — OXYTOCIN 10 [USP'U]/ML
10 INJECTION, SOLUTION INTRAMUSCULAR; INTRAVENOUS
Status: DISCONTINUED | OUTPATIENT
Start: 2024-02-23 | End: 2024-02-25 | Stop reason: HOSPADM

## 2024-02-23 RX ORDER — NALOXONE HYDROCHLORIDE 0.4 MG/ML
0.4 INJECTION, SOLUTION INTRAMUSCULAR; INTRAVENOUS; SUBCUTANEOUS
Status: DISCONTINUED | OUTPATIENT
Start: 2024-02-23 | End: 2024-02-25 | Stop reason: HOSPADM

## 2024-02-23 RX ORDER — MISOPROSTOL 200 UG/1
800 TABLET ORAL
Status: DISCONTINUED | OUTPATIENT
Start: 2024-02-23 | End: 2024-02-25 | Stop reason: HOSPADM

## 2024-02-23 RX ORDER — PENICILLIN G 3000000 [IU]/50ML
3 INJECTION, SOLUTION INTRAVENOUS EVERY 4 HOURS
Status: DISCONTINUED | OUTPATIENT
Start: 2024-02-23 | End: 2024-02-25 | Stop reason: HOSPADM

## 2024-02-23 RX ORDER — INSULIN ASPART 100 [IU]/ML
12 INJECTION, SOLUTION INTRAVENOUS; SUBCUTANEOUS
Status: ON HOLD
Start: 2024-02-23 | End: 2024-02-26

## 2024-02-23 RX ORDER — MISOPROSTOL 200 UG/1
400 TABLET ORAL
Status: DISCONTINUED | OUTPATIENT
Start: 2024-02-23 | End: 2024-02-25 | Stop reason: HOSPADM

## 2024-02-23 RX ORDER — OXYTOCIN/0.9 % SODIUM CHLORIDE 30/500 ML
100-340 PLASTIC BAG, INJECTION (ML) INTRAVENOUS CONTINUOUS PRN
Status: DISCONTINUED | OUTPATIENT
Start: 2024-02-23 | End: 2024-02-25 | Stop reason: HOSPADM

## 2024-02-23 RX ORDER — PENICILLIN G POTASSIUM 5000000 [IU]/1
5 INJECTION, POWDER, FOR SOLUTION INTRAMUSCULAR; INTRAVENOUS ONCE
Status: COMPLETED | OUTPATIENT
Start: 2024-02-23 | End: 2024-02-24

## 2024-02-23 RX ORDER — LIDOCAINE 40 MG/G
CREAM TOPICAL
Status: DISCONTINUED | OUTPATIENT
Start: 2024-02-23 | End: 2024-02-25 | Stop reason: HOSPADM

## 2024-02-23 RX ORDER — NALOXONE HYDROCHLORIDE 0.4 MG/ML
0.2 INJECTION, SOLUTION INTRAMUSCULAR; INTRAVENOUS; SUBCUTANEOUS
Status: DISCONTINUED | OUTPATIENT
Start: 2024-02-23 | End: 2024-02-25 | Stop reason: HOSPADM

## 2024-02-23 RX ORDER — TERBUTALINE SULFATE 1 MG/ML
0.25 INJECTION, SOLUTION SUBCUTANEOUS
Status: COMPLETED | OUTPATIENT
Start: 2024-02-23 | End: 2024-02-24

## 2024-02-23 RX ORDER — CARBOPROST TROMETHAMINE 250 UG/ML
250 INJECTION, SOLUTION INTRAMUSCULAR
Status: DISCONTINUED | OUTPATIENT
Start: 2024-02-23 | End: 2024-02-25 | Stop reason: HOSPADM

## 2024-02-23 RX ORDER — TRANEXAMIC ACID 10 MG/ML
1 INJECTION, SOLUTION INTRAVENOUS EVERY 30 MIN PRN
Status: DISCONTINUED | OUTPATIENT
Start: 2024-02-23 | End: 2024-02-25 | Stop reason: HOSPADM

## 2024-02-23 RX ORDER — LOPERAMIDE HCL 2 MG
2 CAPSULE ORAL
Status: DISCONTINUED | OUTPATIENT
Start: 2024-02-23 | End: 2024-02-25 | Stop reason: HOSPADM

## 2024-02-23 RX ORDER — PROCHLORPERAZINE MALEATE 10 MG
10 TABLET ORAL EVERY 6 HOURS PRN
Status: DISCONTINUED | OUTPATIENT
Start: 2024-02-23 | End: 2024-02-25 | Stop reason: HOSPADM

## 2024-02-23 RX ORDER — PROCHLORPERAZINE 25 MG
25 SUPPOSITORY, RECTAL RECTAL EVERY 12 HOURS PRN
Status: DISCONTINUED | OUTPATIENT
Start: 2024-02-23 | End: 2024-02-25 | Stop reason: HOSPADM

## 2024-02-23 RX ORDER — FENTANYL CITRATE 50 UG/ML
50 INJECTION, SOLUTION INTRAMUSCULAR; INTRAVENOUS EVERY 30 MIN PRN
Status: DISCONTINUED | OUTPATIENT
Start: 2024-02-23 | End: 2024-02-25 | Stop reason: HOSPADM

## 2024-02-23 RX ORDER — METOCLOPRAMIDE HYDROCHLORIDE 5 MG/ML
10 INJECTION INTRAMUSCULAR; INTRAVENOUS EVERY 6 HOURS PRN
Status: DISCONTINUED | OUTPATIENT
Start: 2024-02-23 | End: 2024-02-25 | Stop reason: HOSPADM

## 2024-02-23 RX ORDER — METHYLERGONOVINE MALEATE 0.2 MG/ML
200 INJECTION INTRAVENOUS
Status: DISCONTINUED | OUTPATIENT
Start: 2024-02-23 | End: 2024-02-25 | Stop reason: HOSPADM

## 2024-02-23 RX ORDER — CITRIC ACID/SODIUM CITRATE 334-500MG
30 SOLUTION, ORAL ORAL
Status: DISCONTINUED | OUTPATIENT
Start: 2024-02-23 | End: 2024-02-25 | Stop reason: HOSPADM

## 2024-02-23 RX ORDER — ONDANSETRON 2 MG/ML
4 INJECTION INTRAMUSCULAR; INTRAVENOUS EVERY 6 HOURS PRN
Status: DISCONTINUED | OUTPATIENT
Start: 2024-02-23 | End: 2024-02-25 | Stop reason: HOSPADM

## 2024-02-23 RX ADMIN — MISOPROSTOL 25 MCG: 100 TABLET ORAL at 23:56

## 2024-02-23 RX ADMIN — MISOPROSTOL 25 MCG: 100 TABLET ORAL at 19:03

## 2024-02-23 RX ADMIN — MISOPROSTOL 25 MCG: 100 TABLET ORAL at 21:44

## 2024-02-23 RX ADMIN — INSULIN HUMAN 12 UNITS: 100 INJECTION, SUSPENSION SUBCUTANEOUS at 21:45

## 2024-02-23 ASSESSMENT — ACTIVITIES OF DAILY LIVING (ADL)
ADLS_ACUITY_SCORE: 18

## 2024-02-23 NOTE — TELEPHONE ENCOUNTER
35w5d      Experienced a gush of clear fluid from vagina 5 minutes ago.  Denies contraction, no bloody show, no back pain  Advised to L&D for further assessment.     [facility] L&D is at capacity  Advised to [facility] L&D for assessment and evaluation.  Called notified charge nurse [facility] L&D spoke with charge.  Paged Dr. Lorraine Nguyen who is on call for that group.    Patient agrees will go to [facility] L&D.  ETA one hour

## 2024-02-23 NOTE — TELEPHONE ENCOUNTER
Caller reporting the following red-flag symptom(s): Pt thinks her water broke- 35w 5d    Per the system red-flag symptom policy, patient was instructed to:  speak with a Registered Nurse    Action:  Patient warm transferred to a Registered Nurse

## 2024-02-23 NOTE — LETTER
[facility]  [address]  [place] [address]  [phone]      2024    Tere Alves  [address] [address]  [phone] (home)     : 1990      To Whom it may concern:    Tere Alves was admitted in labor on 2024 . She delivered vaginally  @ 35 w 6 days, by Dr. Lorraine Nguyen DO.  Her baby has required NICU stay due to prematurity.    Please excuse from work for 8 weeks due to recovery from vaginal delivery and  infant recovery care.    Sincerely,    Mag Kwok MD

## 2024-02-23 NOTE — PROGRESS NOTES
35+5 week pt of [facility] stating she had a gush of clear fluid this afternoon around 1430. Denies cramping or pain. States this is a Clomid pregnancy, gestational diabetes insulin controlled. EUM/US explained and applied with pt permission. Admission data base obtained. ROM plus collected and sent to lab. SVE closed, thick and posterior. POC reviewed with pt and spouse. Support given.

## 2024-02-23 NOTE — TELEPHONE ENCOUNTER
Gestational Diabetes Follow-up    Subjective/Objective:    Tere Alves sent in blood glucose log for review. Last date of communication was: 2/13/24.    Gestational diabetes is being managed with diet, activity, and medications    Taking diabetes medications: yes:     Diabetes Medication(s)       Insulin       insulin aspart (NOVOLOG FLEXPEN) 100 UNIT/ML pen Inject 8 Units Subcutaneous daily (with dinner)     insulin NPH (HUMULIN N KWIKPEN) 100 UNIT/ML injection Inject 12 Units Subcutaneous at bedtime If fasting BG 95 or greater x 2 days, increase dose by 2u every 2 days until BG <95            Estimated Date of Delivery: Mar 24, 2024    BG/Food Log:       Assessment:  Post dinner readings remain elevated. Fasting blood sugars in target. Post meal readings are 2 hours.  Recommend Novolog increase.     Ketones: none reported.   Fasting blood glucoses: 100% in target.  After breakfast: 100% in target.  Before lunch: -% in target.  After lunch: 100% in target.  Before dinner: -% in target.  After dinner: 40% in target.    Plan/Response:  Recommend increase to insulin - Novolog 0-0-8-0 --> 0-0-12-0.  Follow up Tuesday.    Kasia Enriquez MS, RD, LD, CDE      Any diabetes medication dose changes were made via the CDE Protocol and Collaborative Practice Agreement with the patient's OB/GYN provider. A copy of this encounter was shared with the provider.

## 2024-02-24 ENCOUNTER — ANESTHESIA (OUTPATIENT)
Dept: OBGYN | Facility: CLINIC | Age: 34
End: 2024-02-24
Payer: COMMERCIAL

## 2024-02-24 ENCOUNTER — ANESTHESIA EVENT (OUTPATIENT)
Dept: OBGYN | Facility: CLINIC | Age: 34
End: 2024-02-24
Payer: COMMERCIAL

## 2024-02-24 LAB
B-OH-BUTYR SERPL-SCNC: 0.6 MMOL/L
GLUCOSE BLDC GLUCOMTR-MCNC: 59 MG/DL (ref 70–99)
GLUCOSE BLDC GLUCOMTR-MCNC: 62 MG/DL (ref 70–99)
GLUCOSE BLDC GLUCOMTR-MCNC: 67 MG/DL (ref 70–99)
GLUCOSE BLDC GLUCOMTR-MCNC: 67 MG/DL (ref 70–99)
GLUCOSE BLDC GLUCOMTR-MCNC: 70 MG/DL (ref 70–99)
GLUCOSE BLDC GLUCOMTR-MCNC: 72 MG/DL (ref 70–99)
GLUCOSE BLDC GLUCOMTR-MCNC: 76 MG/DL (ref 70–99)
GLUCOSE BLDC GLUCOMTR-MCNC: 79 MG/DL (ref 70–99)
GLUCOSE BLDC GLUCOMTR-MCNC: 81 MG/DL (ref 70–99)
GLUCOSE BLDC GLUCOMTR-MCNC: 84 MG/DL (ref 70–99)
GLUCOSE BLDC GLUCOMTR-MCNC: 85 MG/DL (ref 70–99)
GLUCOSE BLDC GLUCOMTR-MCNC: 86 MG/DL (ref 70–99)
GLUCOSE BLDC GLUCOMTR-MCNC: 87 MG/DL (ref 70–99)
GLUCOSE BLDC GLUCOMTR-MCNC: 90 MG/DL (ref 70–99)
GLUCOSE BLDC GLUCOMTR-MCNC: 94 MG/DL (ref 70–99)
GLUCOSE BLDC GLUCOMTR-MCNC: 94 MG/DL (ref 70–99)
GLUCOSE BLDC GLUCOMTR-MCNC: 97 MG/DL (ref 70–99)
T PALLIDUM AB SER QL: NONREACTIVE

## 2024-02-24 PROCEDURE — 999N000016 HC STATISTIC ATTENDANCE AT DELIVERY

## 2024-02-24 PROCEDURE — 59400 OBSTETRICAL CARE: CPT | Performed by: OBSTETRICS & GYNECOLOGY

## 2024-02-24 PROCEDURE — 722N000001 HC LABOR CARE VAGINAL DELIVERY SINGLE

## 2024-02-24 PROCEDURE — 250N000009 HC RX 250: Performed by: OBSTETRICS & GYNECOLOGY

## 2024-02-24 PROCEDURE — 370N000003 HC ANESTHESIA WARD SERVICE: Performed by: STUDENT IN AN ORGANIZED HEALTH CARE EDUCATION/TRAINING PROGRAM

## 2024-02-24 PROCEDURE — 0KQM0ZZ REPAIR PERINEUM MUSCLE, OPEN APPROACH: ICD-10-PCS | Performed by: OBSTETRICS & GYNECOLOGY

## 2024-02-24 PROCEDURE — 250N000011 HC RX IP 250 OP 636: Performed by: OBSTETRICS & GYNECOLOGY

## 2024-02-24 PROCEDURE — 82010 KETONE BODYS QUAN: CPT | Performed by: OBSTETRICS & GYNECOLOGY

## 2024-02-24 PROCEDURE — 88307 TISSUE EXAM BY PATHOLOGIST: CPT | Mod: TC | Performed by: OBSTETRICS & GYNECOLOGY

## 2024-02-24 PROCEDURE — 36415 COLL VENOUS BLD VENIPUNCTURE: CPT | Performed by: OBSTETRICS & GYNECOLOGY

## 2024-02-24 PROCEDURE — 250N000011 HC RX IP 250 OP 636: Performed by: ANESTHESIOLOGY

## 2024-02-24 PROCEDURE — 00HU33Z INSERTION OF INFUSION DEVICE INTO SPINAL CANAL, PERCUTANEOUS APPROACH: ICD-10-PCS | Performed by: ANESTHESIOLOGY

## 2024-02-24 PROCEDURE — 120N000012 HC R&B POSTPARTUM

## 2024-02-24 PROCEDURE — 3E0R3BZ INTRODUCTION OF ANESTHETIC AGENT INTO SPINAL CANAL, PERCUTANEOUS APPROACH: ICD-10-PCS | Performed by: ANESTHESIOLOGY

## 2024-02-24 PROCEDURE — 88307 TISSUE EXAM BY PATHOLOGIST: CPT | Mod: 26 | Performed by: PATHOLOGY

## 2024-02-24 PROCEDURE — 59400 OBSTETRICAL CARE: CPT | Performed by: STUDENT IN AN ORGANIZED HEALTH CARE EDUCATION/TRAINING PROGRAM

## 2024-02-24 PROCEDURE — 258N000003 HC RX IP 258 OP 636: Performed by: OBSTETRICS & GYNECOLOGY

## 2024-02-24 PROCEDURE — 258N000001 HC RX 258: Performed by: OBSTETRICS & GYNECOLOGY

## 2024-02-24 PROCEDURE — 99231 SBSQ HOSP IP/OBS SF/LOW 25: CPT | Performed by: NURSE PRACTITIONER

## 2024-02-24 RX ORDER — OXYTOCIN/0.9 % SODIUM CHLORIDE 30/500 ML
1-24 PLASTIC BAG, INJECTION (ML) INTRAVENOUS CONTINUOUS
Status: DISCONTINUED | OUTPATIENT
Start: 2024-02-24 | End: 2024-02-25 | Stop reason: HOSPADM

## 2024-02-24 RX ORDER — DEXTROSE MONOHYDRATE 25 G/50ML
25-50 INJECTION, SOLUTION INTRAVENOUS
Status: DISCONTINUED | OUTPATIENT
Start: 2024-02-24 | End: 2024-02-25 | Stop reason: HOSPADM

## 2024-02-24 RX ORDER — FENTANYL CITRATE-0.9 % NACL/PF 10 MCG/ML
100 PLASTIC BAG, INJECTION (ML) INTRAVENOUS EVERY 5 MIN PRN
Status: COMPLETED | OUTPATIENT
Start: 2024-02-24 | End: 2024-02-24

## 2024-02-24 RX ORDER — SODIUM CHLORIDE 9 MG/ML
INJECTION, SOLUTION INTRAVENOUS CONTINUOUS
Status: DISCONTINUED | OUTPATIENT
Start: 2024-02-24 | End: 2024-02-25 | Stop reason: HOSPADM

## 2024-02-24 RX ORDER — NICOTINE POLACRILEX 4 MG
15-30 LOZENGE BUCCAL
Status: DISCONTINUED | OUTPATIENT
Start: 2024-02-24 | End: 2024-02-25 | Stop reason: HOSPADM

## 2024-02-24 RX ORDER — FENTANYL CITRATE 50 UG/ML
50 INJECTION, SOLUTION INTRAMUSCULAR; INTRAVENOUS
Status: DISCONTINUED | OUTPATIENT
Start: 2024-02-24 | End: 2024-02-25

## 2024-02-24 RX ORDER — SODIUM CHLORIDE, SODIUM LACTATE, POTASSIUM CHLORIDE, CALCIUM CHLORIDE 600; 310; 30; 20 MG/100ML; MG/100ML; MG/100ML; MG/100ML
INJECTION, SOLUTION INTRAVENOUS CONTINUOUS PRN
Status: DISCONTINUED | OUTPATIENT
Start: 2024-02-24 | End: 2024-02-25 | Stop reason: HOSPADM

## 2024-02-24 RX ORDER — NALBUPHINE HYDROCHLORIDE 20 MG/ML
2.5-5 INJECTION, SOLUTION INTRAMUSCULAR; INTRAVENOUS; SUBCUTANEOUS EVERY 6 HOURS PRN
Status: DISCONTINUED | OUTPATIENT
Start: 2024-02-24 | End: 2024-02-27 | Stop reason: HOSPADM

## 2024-02-24 RX ORDER — LIDOCAINE 40 MG/G
CREAM TOPICAL
Status: DISCONTINUED | OUTPATIENT
Start: 2024-02-24 | End: 2024-02-25 | Stop reason: HOSPADM

## 2024-02-24 RX ADMIN — Medication: at 05:43

## 2024-02-24 RX ADMIN — Medication 100 MCG: at 06:02

## 2024-02-24 RX ADMIN — Medication 100 MCG: at 06:14

## 2024-02-24 RX ADMIN — PENICILLIN G POTASSIUM 5 MILLION UNITS: 5000000 POWDER, FOR SOLUTION INTRAMUSCULAR; INTRAPLEURAL; INTRATHECAL; INTRAVENOUS at 02:42

## 2024-02-24 RX ADMIN — SODIUM CHLORIDE, POTASSIUM CHLORIDE, SODIUM LACTATE AND CALCIUM CHLORIDE 1000 ML: 600; 310; 30; 20 INJECTION, SOLUTION INTRAVENOUS at 05:12

## 2024-02-24 RX ADMIN — Medication 100 MCG: at 06:20

## 2024-02-24 RX ADMIN — PENICILLIN G 3 MILLION UNITS: 3000000 INJECTION, SOLUTION INTRAVENOUS at 18:48

## 2024-02-24 RX ADMIN — Medication 100 MCG: at 06:29

## 2024-02-24 RX ADMIN — SODIUM CHLORIDE, POTASSIUM CHLORIDE, SODIUM LACTATE AND CALCIUM CHLORIDE: 600; 310; 30; 20 INJECTION, SOLUTION INTRAVENOUS at 14:45

## 2024-02-24 RX ADMIN — PENICILLIN G 3 MILLION UNITS: 3000000 INJECTION, SOLUTION INTRAVENOUS at 06:46

## 2024-02-24 RX ADMIN — DEXTROSE MONOHYDRATE 25 ML: 25 INJECTION, SOLUTION INTRAVENOUS at 18:02

## 2024-02-24 RX ADMIN — PENICILLIN G 3 MILLION UNITS: 3000000 INJECTION, SOLUTION INTRAVENOUS at 15:00

## 2024-02-24 RX ADMIN — Medication: at 12:48

## 2024-02-24 RX ADMIN — SODIUM CHLORIDE, POTASSIUM CHLORIDE, SODIUM LACTATE AND CALCIUM CHLORIDE: 600; 310; 30; 20 INJECTION, SOLUTION INTRAVENOUS at 06:21

## 2024-02-24 RX ADMIN — Medication 2 MILLI-UNITS/MIN: at 08:33

## 2024-02-24 RX ADMIN — SODIUM CHLORIDE, POTASSIUM CHLORIDE, SODIUM LACTATE AND CALCIUM CHLORIDE 500 ML: 600; 310; 30; 20 INJECTION, SOLUTION INTRAVENOUS at 02:18

## 2024-02-24 RX ADMIN — TERBUTALINE SULFATE 0.25 MG: 1 INJECTION, SOLUTION SUBCUTANEOUS at 06:16

## 2024-02-24 RX ADMIN — PENICILLIN G 3 MILLION UNITS: 3000000 INJECTION, SOLUTION INTRAVENOUS at 11:10

## 2024-02-24 RX ADMIN — DEXTROSE MONOHYDRATE 25 ML: 25 INJECTION, SOLUTION INTRAVENOUS at 14:39

## 2024-02-24 ASSESSMENT — ACTIVITIES OF DAILY LIVING (ADL)
ADLS_ACUITY_SCORE: 18

## 2024-02-24 NOTE — PROGRESS NOTES
Dr Trotter notified of prolonged deceleration and recurrent late variables that occurred after patient received an epidural, which has now resolved (see flowsheet). VSS. Patient resting in R tilt position at this time. Pt denies pain. Orders received to start active labor diabetic management. NNP consult in, and due to round on patient. Cristina Catheter in place. Report given to mOa KENNEDY at 0710.

## 2024-02-24 NOTE — ANESTHESIA PROCEDURE NOTES
Epidural catheter Procedure Note    Pre-Procedure   Staff -        Anesthesiologist:  Teddy Cano MD       Performed By: anesthesiologist       Location: OB       Pre-Anesthestic Checklist: patient identified, IV checked, site marked, risks and benefits discussed, informed consent, monitors and equipment checked, pre-op evaluation and at physician/surgeon's request  Timeout:       Correct Patient: Yes        Correct Procedure: Yes        Correct Site: Yes        Correct Position: Yes   Procedure Documentation  Procedure: epidural catheter       Patient Position: sitting       Patient Prep/Sterile Barriers: sterile gloves, mask, patient draped       Skin prep: Betadine       Local skin infiltrated with 1 mL of 1% lidocaine.        Insertion Site: L3-4. (midline approach).       Technique: LORT saline        Needle Type: Touhy needle       Needle Gauge: 17.        Needle Length (Inches): 3.5        Catheter: 19 G.          Catheter threaded easily.             # of attempts: 1 and  # of redirects:  0    Assessment/Narrative         Paresthesias: No.       Test dose of 3 mL lidocaine 1.5% w/ 1:200,000 epinephrine at 05:45 CST.         Test dose negative, 3 minutes after injection, for signs of intravascular, subdural, or intrathecal injection.       Insertion/Infusion Method: LORT saline       Aspiration negative for Heme or CSF via Epidural Catheter.    Medication(s) Administered   0.125% Bupivacaine + 2 mcg/mL Fentanyl via CADD (Epidural) - EPIDURAL   8 mL - 2/24/2024 5:48:00 AM   Comments:  Pre-procedure time out completed. Patient in sitting position, the lumbar spine was prepped and draped in sterile fashion. The L3/L4 interspace was identified and local anesthetic was injected for local skin infiltration. A 17 G touhy needle was advanced to the epidural space which was confirmed with the loss of resistance technique at 5 cm. A catheter was then advanced easily into the epidural space. The catheter was  "left at 9 cm at the skin. Negative aspiration of blood and CSF was confirmed. A test dose of 1.5% lidocaine with 1:200,000 epinephrine was injected through the catheter and was negative for intravascular injection. The site was covered with sterile tegaderm and the catheter was secured with tape.       FOR [facility] ([facility]) ONLY:   Pain Team Contact information: please page the Pain Team Via [email or web address]. Search \"Pain\". During daytime hours, please page the attending first. At night please page the resident first.      "

## 2024-02-24 NOTE — H&P
"Admission Note:    Significant change in general health status.  See prenatal record.     33yo G1 @ 35+5wk presenting with PPROM. Reported ROM around 2:30pm. No contractions/vb. No fevers. +FM.     Is patient of [facility] [place], diverted here to [place].     Pregnancy c/b GDMA2 on insulin, 12U Novolog with dinner, 12U NPH at bedtime. Inc insulin yesterday as dinner PP were out of range. Last growth ultrasound 1/15/24 EFW 55% 1611g, anterior placenta.  24 BPP 8/8, cephalic.   AMA. Nl level II. Previa-resolved subquently  GBS unknown    Vitals:    24 1515 24 1532   BP: 130/84    BP Location: Left arm    Patient Position: Semi-East's    Cuff Size: Adult Regular    Pulse: 100    Resp: 18    Temp: 98.4  F (36.9  C)    TempSrc: Temporal    Weight:  82.1 kg (181 lb)   Height:  1.575 m (5' 2\")     Gen:Aox3, NAD  Resp: nonlabored breathing  Abd: soft, gravid, NT    Bedside ultrasound cephalic   cat 1.  [place] q 5-6 (patient not feeling)    SVE closed/th/high    EFW: 5lb    Primary OB: [place]      Discussed PPROM diagnosis,  pregnancy, gestational diabetes in labor and plan of care.  -PPROM, GBS unknown.  PCN prophylaxis  Cytotec oral ripening, then likely transition to pitocin. Discussed these meds and expectations.   Pain control as desired.  Will have NNP talk with patient regarding  delivery  -GDMA2. Fairly well controlled.  Will cont on diet during ripening. Cont home novolog with dinner and NPH at bedtime. Then switch to intrapartum managment when labor. Discussed need for tight BS control approaching delivery and why this is necessary for infant wellbeing.   -patient and  given opportunity to ask questions, these were addressed to their satisfaction    Lorraine Arreaga Masters, DO  2024    "

## 2024-02-24 NOTE — ANESTHESIA PREPROCEDURE EVALUATION
Anesthesia Pre-Procedure Evaluation    Patient: Tere Alves   MRN: [id number] : 1990        Procedure :           Past Medical History:   Diagnosis Date    Abnormal uterine bleeding     Anemia       Past Surgical History:   Procedure Laterality Date    OPERATIVE HYSTEROSCOPY N/A 2021    Procedure: HYSTEROSCOPY, THERAPEUTIC with Versapoint;  Surgeon: Rich Thorne MD;  Location:  OR      No Known Allergies   Social History     Tobacco Use    Smoking status: Never    Smokeless tobacco: Never   Substance Use Topics    Alcohol use: Not Currently     Comment: couple drinks/month      Wt Readings from Last 1 Encounters:   24 82.1 kg (181 lb)        Anesthesia Evaluation            ROS/MED HX  ENT/Pulmonary:    (-) asthma   Neurologic:  - neg neurologic ROS     Cardiovascular:    (-) PIH   METS/Exercise Tolerance:     Hematologic:     (+)  no anticoagulation therapy, no coagulopathy,             Musculoskeletal:       GI/Hepatic:     (+) GERD,                   Renal/Genitourinary:       Endo:     (+)                  gestational diabetes and Insulin,    Psychiatric/Substance Use:       Infectious Disease:       Malignancy:       Other:            Physical Exam    Airway        Mallampati: II   TM distance: > 3 FB   Neck ROM: full     Respiratory Devices and Support         Dental  no notable dental history         Cardiovascular   cardiovascular exam normal          Pulmonary   pulmonary exam normal                OUTSIDE LABS:  CBC:   Lab Results   Component Value Date    WBC 7.4 2024    WBC 7.9 2023    HGB 12.5 2024    HGB 11.7 2023    HCT 36.9 2024    HCT 37.1 2023     2024     2023     BMP:   Lab Results   Component Value Date     10/18/2017    POTASSIUM 4.5 10/18/2017    CHLORIDE 106 10/18/2017    CO2 25 10/18/2017    BUN 11 10/18/2017    CR 0.73 10/18/2017     (H) 2024     (H) 2024     COAGS:    Lab Results   Component Value Date    INR 0.95 10/18/2017     POC:   Lab Results   Component Value Date    HCG Negative 01/27/2021    HCGS Negative 02/16/2021     HEPATIC:   Lab Results   Component Value Date    ALBUMIN 3.7 10/18/2017    PROTTOTAL 8.0 10/18/2017    ALT 18 10/18/2017    AST 18 10/18/2017    ALKPHOS 53 10/18/2017    BILITOTAL 0.8 10/18/2017     OTHER:   Lab Results   Component Value Date    A1C 5.7 (H) 09/27/2022    DAT 8.8 10/18/2017    TSH 3.05 09/27/2022       Anesthesia Plan    ASA Status:  3       Anesthesia Type: Epidural.              Consents    Anesthesia Plan(s) and associated risks, benefits, and realistic alternatives discussed. Questions answered and patient/representative(s) expressed understanding.     - Discussed:     - Discussed with:  Patient            Postoperative Care            Comments:    Other Comments: Orders to manage the epidural infusion have been entered, and through coordination with the nurse, we will continute to manage and monitor the patient's labor epidural.  We will continuously be available to adjust as needed thruout the entire L&D process.            Teddy Cano MD    I have reviewed the pertinent notes and labs in the chart from the past 30 days and (re)examined the patient.  Any updates or changes from those notes are reflected in this note.

## 2024-02-24 NOTE — PROGRESS NOTES
"Labor Progress Note    S: No complaints. Wondering how long before delivery  Felt the NNP information was helpful    O:   Vitals:    02/24/24 0700 02/24/24 0705 02/24/24 0710 02/24/24 0730   BP: 111/59 96/51     BP Location:       Patient Position:       Cuff Size:       Pulse:       Resp: 16 16 16 18   Temp:       TempSrc:       SpO2: 95% 97%     Weight:       Height:         BP 96/51   Pulse 100   Temp 98.1  F (36.7  C) (Oral)   Resp 18   Ht 1.575 m (5' 2\")   Wt 82.1 kg (181 lb)   LMP 06/12/2023   SpO2 97%   BMI 33.11 kg/m      Gen: AOx3, NAD    SVE: 2/80/-2, soft, mid.   FSE in place    FHT: 150 min variability, +accels, no decels.  +Scalp stim  [place]: q 3-4 min    Pitocin: none    A/P: 33yo G1 @ 35+6wk admitted for PPROM, induction of labor. Pregnancy c/b GDMA2 and AMA.  -Reassuring fetal status. Unchanged cervix. Will start pitocin labor induction.  Questions answered. Patient and  happy with plan      Lorraine Arreaga Masters, DO  February 24, 2024  8:07 AM    "

## 2024-02-24 NOTE — PROGRESS NOTES
"Labor Progress Note    S:  Called by RN for prolonged deceleration lasting 8-9 min abot 15min prior, recovery achieved but with recurrent late decels. Instructions given for positioning changes and to call back if no improvement in short time.   Received second call by another RN that lates persisting and to come to bedside. Requested in house physician be attending to patient in mean time in even c/s necessary. Informed in house was involved    Upon arrival RN, partner and patient in room, patient in right lateral. Had just received 4th dose of ephedrine.    Decels resolved, variability moderate.      O:   Vitals:    02/24/24 0225 02/24/24 0335 02/24/24 0435 02/24/24 0620   BP:  109/63     BP Location:  Right arm     Patient Position:  Semi-East's     Cuff Size:  Adult Regular     Pulse:       Resp:  16     Temp: 98.2  F (36.8  C) 98.7  F (37.1  C) 98.2  F (36.8  C) 98.1  F (36.7  C)   TempSrc: Oral Oral Oral Oral   Weight:       Height:         /63 (BP Location: Right arm, Patient Position: Semi-East's, Cuff Size: Adult Regular)   Pulse 100   Temp 98.1  F (36.7  C) (Oral)   Resp 16   Ht 1.575 m (5' 2\")   Wt 82.1 kg (181 lb)   LMP 06/12/2023   BMI 33.11 kg/m      Gen: AOx3, NAD    SVE: 2cm per NR    FHT: 150 mod variability, no decel no accel  [place]: q 2-4min    Pitocin: none    A/P: 35yo G1 @ 35+6wk admitted with PPROM as divert from [place]. Pregnancy c/b AMA and GDMA2 on insulin with fair control.    -PPROM, induction of labor, with abnormal fetal heart rate changes subsequent to epidural.  Received 3 doses cytotec orally, last 2356. Fetal tachy 165 with mod variabilty/accels, delayed next dose cytotec. FHT normalized and pitocin ordered, however, patient then requested epidural and pitocin never started. Has progressed from closed to 2cm.   FHR changes resolved with 4doses ephedrine. Discussed with family what took place and why. Discussed recovery. Reviewed that we will montior and allow " for continued recovery, and then need to continue with induction with pitocin. Discussed fetus may not tolerate the stress of labor an if this is the case, will need c/s.   Patient and  given time for questions and these were answered to their satisfaction.   NNP consulted, had not been completed yet.  Will have NICU at delivery.    -GDMA2. Previously fairly well controlled with exception of dinner PP's. Received 12U novolog with dinner and 12U NPH at bedtime. BS this am fasting 84. Will proceed with intrapartum labor orders. Goal  or less at delivery, insulin GTT if needed.    -GBS unknown.  PCN ordered at admission around 6pm, not started until 0242. Will continue. No fevers.       Lorraine Arreaga Masters, DO  February 24, 2024  6:55 AM

## 2024-02-24 NOTE — PROVIDER NOTIFICATION
02/23/24 1730   Provider Notification   Provider Name/Title Dr. Nguyens   Method of Notification At Bedside     Dr at bedside. Ultrasound to verify position. POC reviewed with pt and spouse. Report to Ale Ramesh RN. Care taken over.

## 2024-02-24 NOTE — CONSULTS
[facility]                Neonatology Antepartum Counseling Consult:  I was asked to provide antepartum counseling for Tere Alves at the request of Masters, Lorraine Arreaga,  secondary to  labor. Ms. Tere Alves is currently 35 weeks/ 6 days gestation and has a history significant for:  Patient Active Problem List   Diagnosis    Abnormal uterine bleeding    Endometrial polyp    Supervision of normal first pregnancy, antepartum    Insulin controlled gestational diabetes mellitus (GDM) in third trimester    Encounter for triage in pregnant patient     premature rupture of membranes (PPROM) delivered, current hospitalization      Betamethasone was not given  Ms. Tere Alves, accompanied by her partner, was counseled on the expected hospital course, potential risks, and outcomes associated with an infant born at this gestation. The counseling included: initial delivery room stabilization, respiratory course, and hypoglycemia.  I also explained that infant will not automatically be admitted to the NICU but will evaluate at the time of delivery and depending on glucoses.  The patient had no remaining questions but was encouraged to contact the NICU via their caregivers should any arise.  Please feel free to call and thank you for involving the NICU team in the care of your patient.      Floor Time (min): 5  Face to Face Time (min): 15  Total Time (minutes): 20  More than 50% of my time was spent in direct, face to face, antepartum counseling with the above patient.     LAMIEN Forman 2024 7:47 AM

## 2024-02-24 NOTE — PROGRESS NOTES
Dr Trotter notified of FHR baseline 160, with mod variability and accels, maternal VSS, and patient rios every 2-4 min. Orders received for IV Oxytocin for induction and IV fentanyl for pain.

## 2024-02-24 NOTE — PROGRESS NOTES
Dr Trotter notified of fetal tachycardia and 500 ml bolus of LR given. Penicillin started for unknown GBS status. Orders received to hold PO cytotec for an hour until fetal tachycardia resolves, and to notify  if it continues.

## 2024-02-24 NOTE — PLAN OF CARE
Plan of care reviewed with pt and spouse. SVE 2cm, 80, -1, mid per Dr Trotter. FHTs reassuring, no further lates. Pt rios q 2-7 minutes. Orders received to start pitocin to augment labor. Active labor diabetes management protocol started per Dr Trotter. Pt BG @ 0800 was 98. FHTs 145, mod panda, accels present, occ small variables. IV started in R hand, saline locked.

## 2024-02-25 LAB
GLUCOSE BLDC GLUCOMTR-MCNC: 78 MG/DL (ref 70–99)
HGB BLD-MCNC: 12.1 G/DL (ref 11.7–15.7)

## 2024-02-25 PROCEDURE — 250N000013 HC RX MED GY IP 250 OP 250 PS 637: Performed by: OBSTETRICS & GYNECOLOGY

## 2024-02-25 PROCEDURE — 120N000012 HC R&B POSTPARTUM

## 2024-02-25 PROCEDURE — 85018 HEMOGLOBIN: CPT | Performed by: OBSTETRICS & GYNECOLOGY

## 2024-02-25 PROCEDURE — 36415 COLL VENOUS BLD VENIPUNCTURE: CPT | Performed by: OBSTETRICS & GYNECOLOGY

## 2024-02-25 RX ORDER — ACETAMINOPHEN 325 MG/1
650 TABLET ORAL EVERY 4 HOURS PRN
Status: DISCONTINUED | OUTPATIENT
Start: 2024-02-25 | End: 2024-02-27 | Stop reason: HOSPADM

## 2024-02-25 RX ORDER — NALOXONE HYDROCHLORIDE 0.4 MG/ML
0.4 INJECTION, SOLUTION INTRAMUSCULAR; INTRAVENOUS; SUBCUTANEOUS
Status: DISCONTINUED | OUTPATIENT
Start: 2024-02-25 | End: 2024-02-27 | Stop reason: HOSPADM

## 2024-02-25 RX ORDER — NALOXONE HYDROCHLORIDE 0.4 MG/ML
0.2 INJECTION, SOLUTION INTRAMUSCULAR; INTRAVENOUS; SUBCUTANEOUS
Status: DISCONTINUED | OUTPATIENT
Start: 2024-02-25 | End: 2024-02-27 | Stop reason: HOSPADM

## 2024-02-25 RX ORDER — MODIFIED LANOLIN
OINTMENT (GRAM) TOPICAL
Status: DISCONTINUED | OUTPATIENT
Start: 2024-02-25 | End: 2024-02-27 | Stop reason: HOSPADM

## 2024-02-25 RX ORDER — DOCUSATE SODIUM 100 MG/1
100 CAPSULE, LIQUID FILLED ORAL DAILY
Status: DISCONTINUED | OUTPATIENT
Start: 2024-02-25 | End: 2024-02-27 | Stop reason: HOSPADM

## 2024-02-25 RX ORDER — MISOPROSTOL 200 UG/1
800 TABLET ORAL
Status: DISCONTINUED | OUTPATIENT
Start: 2024-02-25 | End: 2024-02-27 | Stop reason: HOSPADM

## 2024-02-25 RX ORDER — HYDROCORTISONE 25 MG/G
CREAM TOPICAL 3 TIMES DAILY PRN
Status: DISCONTINUED | OUTPATIENT
Start: 2024-02-25 | End: 2024-02-27 | Stop reason: HOSPADM

## 2024-02-25 RX ORDER — OXYTOCIN 10 [USP'U]/ML
10 INJECTION, SOLUTION INTRAMUSCULAR; INTRAVENOUS
Status: DISCONTINUED | OUTPATIENT
Start: 2024-02-25 | End: 2024-02-27 | Stop reason: HOSPADM

## 2024-02-25 RX ORDER — CARBOPROST TROMETHAMINE 250 UG/ML
250 INJECTION, SOLUTION INTRAMUSCULAR
Status: DISCONTINUED | OUTPATIENT
Start: 2024-02-25 | End: 2024-02-27 | Stop reason: HOSPADM

## 2024-02-25 RX ORDER — OXYTOCIN/0.9 % SODIUM CHLORIDE 30/500 ML
340 PLASTIC BAG, INJECTION (ML) INTRAVENOUS CONTINUOUS PRN
Status: DISCONTINUED | OUTPATIENT
Start: 2024-02-25 | End: 2024-02-27 | Stop reason: HOSPADM

## 2024-02-25 RX ORDER — TRANEXAMIC ACID 10 MG/ML
1 INJECTION, SOLUTION INTRAVENOUS EVERY 30 MIN PRN
Status: DISCONTINUED | OUTPATIENT
Start: 2024-02-25 | End: 2024-02-27 | Stop reason: HOSPADM

## 2024-02-25 RX ORDER — MISOPROSTOL 200 UG/1
400 TABLET ORAL
Status: DISCONTINUED | OUTPATIENT
Start: 2024-02-25 | End: 2024-02-27 | Stop reason: HOSPADM

## 2024-02-25 RX ORDER — BISACODYL 10 MG
10 SUPPOSITORY, RECTAL RECTAL DAILY PRN
Status: DISCONTINUED | OUTPATIENT
Start: 2024-02-25 | End: 2024-02-27 | Stop reason: HOSPADM

## 2024-02-25 RX ORDER — LOPERAMIDE HCL 2 MG
4 CAPSULE ORAL
Status: DISCONTINUED | OUTPATIENT
Start: 2024-02-25 | End: 2024-02-27 | Stop reason: HOSPADM

## 2024-02-25 RX ORDER — METHYLERGONOVINE MALEATE 0.2 MG/ML
200 INJECTION INTRAVENOUS
Status: DISCONTINUED | OUTPATIENT
Start: 2024-02-25 | End: 2024-02-27 | Stop reason: HOSPADM

## 2024-02-25 RX ORDER — IBUPROFEN 400 MG/1
800 TABLET, FILM COATED ORAL EVERY 6 HOURS PRN
Status: DISCONTINUED | OUTPATIENT
Start: 2024-02-25 | End: 2024-02-26

## 2024-02-25 RX ORDER — LOPERAMIDE HCL 2 MG
2 CAPSULE ORAL
Status: DISCONTINUED | OUTPATIENT
Start: 2024-02-25 | End: 2024-02-27 | Stop reason: HOSPADM

## 2024-02-25 RX ADMIN — DOCUSATE SODIUM 100 MG: 100 CAPSULE, LIQUID FILLED ORAL at 07:54

## 2024-02-25 RX ADMIN — IBUPROFEN 800 MG: 400 TABLET ORAL at 20:35

## 2024-02-25 RX ADMIN — ACETAMINOPHEN 650 MG: 325 TABLET, FILM COATED ORAL at 13:52

## 2024-02-25 RX ADMIN — IBUPROFEN 800 MG: 400 TABLET ORAL at 13:52

## 2024-02-25 RX ADMIN — IBUPROFEN 800 MG: 400 TABLET ORAL at 07:53

## 2024-02-25 RX ADMIN — IBUPROFEN 800 MG: 400 TABLET ORAL at 01:15

## 2024-02-25 RX ADMIN — ACETAMINOPHEN 650 MG: 325 TABLET, FILM COATED ORAL at 07:53

## 2024-02-25 RX ADMIN — ACETAMINOPHEN 650 MG: 325 TABLET, FILM COATED ORAL at 01:15

## 2024-02-25 RX ADMIN — ACETAMINOPHEN 650 MG: 325 TABLET, FILM COATED ORAL at 20:35

## 2024-02-25 ASSESSMENT — ACTIVITIES OF DAILY LIVING (ADL)
ADLS_ACUITY_SCORE: 18

## 2024-02-25 NOTE — LACTATION NOTE
Lactation visit with Tere.    Infant is 35+6 and was admitted to our NICU in the middle of the night for low blood sugars. During our visit this morning, educated Tere on using our hospital grade Medela breast pump. Fit Tere with a 21mm flange and showed her how to utilize the Initiate Mode. Tere may need to increase to a 24mm flange, discussed experimenting with size for comfort. Tere pumped for 15 minutes and a few drops expressed from the pump. Encouraged Tere to pump every 3 hours to mimic the stimulation of a nursing baby * x in 24 hours). Discussed feeding expectations of a 35 week infant, supplementing and pumping may be required for at least a couple of weeks.    Offered ongoing Lactation support to Tere while infant is receiving care in our NICU.       Appreciative of visit.    Kinsey Payton RN, IBCLC

## 2024-02-25 NOTE — PLAN OF CARE
Pt. admitted from L&D  via wheelchair and transferred to bed with yelitza aranda. Pt. arrived with baby and was accompanied by RICHARD RN and family and arrived with personal belongings. Report was taken from Hemanth KENNEDY in L&D. VS stable. Fundus is firm and midline.  Vaginal bleeding is scant. Pitocin infusing, see MAR.  Pt. oriented to the room and call light system. Admission folder reviewed, will continue with plan of care.

## 2024-02-25 NOTE — PLAN OF CARE
Fundus firm and bleeding wnl.  VSS.  Voiding without difficulty. Taking tylenol and ibuprofen as needed with good relief.  Up independently. Swelling noted on patient's L hand/arm, old IV site that was used to infuse penicillin. Charge nurse notified and pharmacy was contacted regarding findings. Per pharmacy applying warm compress and elevating, will continue to assess. Using braxton-bottle and tucks.  Encouraged to call with questions or concerns.  Will continue with plan of care.   Goal Outcome Evaluation:      Plan of Care Reviewed With: patient    Overall Patient Progress: improvingOverall Patient Progress: improving

## 2024-02-25 NOTE — PROGRESS NOTES
Data: Tere Alves transferred to  422 via wheelchair at 2315. Baby transferred via parent's arms.  Action: Receiving unit notified of transfer: Yes. Patient and family notified of room change. Report given to Lisa RN at 2320. Belongings sent to receiving unit. Accompanied by Registered Nurse. Oriented patient to surroundings. Call light within reach. ID bands double-checked with receiving RN.  Response: Patient tolerated transfer and is stable.

## 2024-02-25 NOTE — PROGRESS NOTES
Dr. Kwok on unit, discussed applying ace wrap to patient's wrist if she desired. Will order from .

## 2024-02-25 NOTE — PROVIDER NOTIFICATION
02/24/24 1950   Provider Notification   Provider Name/Title Dr Trotter   Method of Notification Phone   Request Evaluate in Person   Notification Reason Labor Status;Status Update;SVE     Dr Trotter updated on patient status and that patient is 10 cm/100%/+1. Trial push effective. Dr Trotter on her way. Patient repositioned L Lateral and breathing through contractions.

## 2024-02-25 NOTE — PROGRESS NOTES
[facility]    Post-Partum Progress Note    Assessment & Plan   Assessment:  Post-partum day #1  Normal spontaneous vaginal delivery - PPROM with pregnancy complicated by GDMA2 and AMA    Doing well.  No excessive bleeding  Pain well-controlled.  Some increased swelling in hands today  Blood sugars within normal limits    Plan:  Ambulation encouraged  Breast feeding strategies discussed  Lactation consultation  Pain control measures as needed  Reportable signs and symptoms dicussed with the patient  Extravasation due to IV on left arm - Recommend elevating left arm, encouraging arm movement, and applying warm compresses.   Work note completed for patient with delivery date and recommendation for 8 weeks off due to  delivery    Anticipate discharge in 1-2 days    Mag Kwok MD on 2024 at 10:29 AM      Interval History   Doing well.  Pain is well-controlled.  No fevers.  No history of foul-smelling vaginal discharge.  Good appetite.  Denies chest pain, shortness of breath, nausea or vomiting.  Vaginal bleeding is similar to a heavy menstrual flow.  Ambulatory.  Patient is going to trial breastfeeding today. Patient notes that her hands are more swollen, but no pain associated with swelling. Patient also wondering about work note for short term disability    Medications    - MEDICATION INSTRUCTIONS -      - MEDICATION INSTRUCTIONS -      oxytocin        docusate sodium  100 mg Oral Daily    fentaNYL (SUBLIMAZE) 2 mcg/mL, BUPivacaine (MARCAINE) 0.125%   EPIDURAL PCEA    fentaNYL/BUPivacaine  10 mL EPIDURAL Once       Physical Exam   Temp: 98  F (36.7  C) Temp src: Oral BP: 108/64 Pulse: 61   Resp: 16 SpO2: 96 % O2 Device: None (Room air)    Vitals:    24 1532   Weight: 82.1 kg (181 lb)     Vital Signs with Ranges  Temp:  [97.2  F (36.2  C)-99.3  F (37.4  C)] 98  F (36.7  C)  Pulse:  [54-85] 61  Resp:  [16-18] 16  BP: (101-138)/(55-79) 108/64  SpO2:  [93 %-100 %] 96 %  I/O  last 3 completed shifts:  In: 3 [I.V.:3]  Out: 2100 [Urine:2000; Blood:100]    Uterine fundus is firm, non-tender and at the level of the umbilicus  Extremities Non-tender  Heart is regular rate and rhythm and lungs clear to auscultation  Upper extremities: Left upper extremity edematous to IV extravasation     Data   Recent Labs   Lab Test 02/23/24  1851   AS Negative     Recent Labs   Lab Test 02/25/24  0834 02/23/24 1851 12/13/23  0900 08/30/23  0814   WBC  --  7.4  --  7.9   HGB 12.1 12.5   < > 12.5    < > = values in this interval not displayed.     Recent Labs   Lab Test 08/30/23  0814   RUQIGG Positive

## 2024-02-25 NOTE — L&D DELIVERY NOTE
OB Vaginal Delivery Note    Tere Alves MRN# [id number]   Age: 34 year old YOB: 1990       GA: 35w6d  GP:   Admission diagnosis: PPROM, 35+5wk, GDMA2 on insulin, AMA, maternal obesity, unknown GBS  Labor Complications: none  Delivery QBL: 100 mL  Delivery Type: Vaginal, Spontaneous   Procedure: , repair 2nd degree laceration (3cm)  Findings: Tight nuchal cord, clamped and as was being cut baby delivered as was being cut. Insulin drip at time of delivery  ROM to Delivery Time: rupture date or rupture time have not been documented  [place] Weight: 2.8 kg (6 lb 2.8 oz)    1 Minute 5 Minute 10 Minute   Apgar Totals: 7   8        JANET HERNÁNDEZ;LIZ RUBIO     Delivery Details:  Tere Alves, a 34 year old  female delivered a viable infant with apgars of 7  and 8 . Patient was fully dilated and pushing after 4  hours 45  minutes in active labor. NICU team in attendance. Delivery was via vaginal, spontaneous  to a sterile field under epidural  anesthesia. Infant delivered in vertex  right  occiput  anterior  position. Tight nuchal cord noted with delivery of fetal head, clamped and as was being cut baby delivered as was being cut. Anterior and posterior shoulders delivered without difficulty. Cord contained 3 vessels  were noted. Cord gasses were obtained in routine fashion.    Cord complications: nuchal   Placenta delivered at 2024  8:38 PM . Placental disposition was Pathology . Fundal massage performed and fundus found to be firm.     Episiotomy: none    Perineum, vagina, cervix were inspected, and the following lacerations were noted:   Perineal lacerations: 2nd , 3cm in length. Lacerations were repaired in the usual fashion using 2-0 vicryl.    Excellent hemostasis was noted. Needle count correct. Infant and patient in delivery room in good and stable condition.        Levi Alves [[id number]]      Labor Event Times      Latent labor onset date/time:  2024 0800    Active labor onset date: 24 Onset time:  1:30 PM   Dilation complete date: 24 Complete time:  6:15 PM   Start pushing date/time: 2024 1930          Labor Length      1st Stage (hrs): 4 (min): 45   2nd Stage (hrs): 2 (min): 18   3rd Stage (hrs): 0 (min): 5          Labor Events     labor?: Yes   steroids: None  Labor Type: Induction/Cervical ripening  Predominate monitoring during 1st stage: continuous electronic fetal monitoring     Antibiotics received during labor?: Yes       Rupture date/time: 24    Rupture type: Spontaneous Rupture of Membranes  Fluid color: Clear, Pink     Induction: Misoprostol, Oxytocin  Induction date/time: 24     Cervical ripening date/time: 24    Indications for induction:  Prelabor ROM     Augmentation: Oxytocin  Indications for augmentation: Ineffective Contraction Pattern       Delivery/Placenta Date and Time      Delivery Date: 24 Delivery Time:  8:33 PM   Placenta Date/Time: 2024  8:38 PM  Oxytocin given at the time of delivery: after delivery of baby  Delivering clinician: Lorraine Trotter DO   Other personnel present at delivery:  Provider Role   Zi Delatorre RN Registered Nurse   Lorraine Younger RN              Vaginal Counts       Initial count performed by 2 team members:  Two Team Members   zi Trotter         Needles Suture Needles Sponges (RETIRED) Instruments   Initial counts 2  5    Added to count  1     Relief counts       Final counts 2 1 5            Placed during labor Accounted for at the end of labor   FSE Yes Yes   IUPC NA NA   Cervidil NA NA           Relief count performed by 2 team members:  Two Team Members   zi Trotter             Final count correct?: Yes  Pre-Birth Team Brief: Complete  Post-Birth Team Debrief: Complete       Apgars    Living status: Living   1 Minute 5 Minute 10 Minute 15 Minute 20 Minute   Skin color: 0  1       Heart  "rate: 2  2       Reflex irritability: 2  2       Muscle tone: 2  1       Respiratory effort: 1  2       Total: 7  8       Apgars assigned by: BRENT GOVEA & HEATHER GUEVARA CNP       Cord      Vessels: 3 Vessels    Cord Complications: Nuchal   Nuchal Intervention: clamped and cut, delivered through         Nuchal cord description: tight nuchal cord         Cord Blood Disposition: Discard    Gases Sent?: Yes    Delayed cord clamping?: Yes    Cord Clamping Delay (seconds): 31-60 seconds    Stem cell collection?: No           [place] Resuscitation    Methods: Oximetry   Care at Delivery: Requested by Lorraine Trotter DO to attend delivery. 35yo G1 35w6d gestation with PPROM ROM 25.5 hours prior to delivery). Infant with spontaneous respirations/cry. Delayed cord clamping 31-60 seconds. Infant placed on preheated radiant warmer for further evaluation. Infant dried/stimulated and bulb suctioned. Pulse oximeter applied. SaO2 >92% when correlating.   Infant placed skin to skin with mother.   HEATHER Akers CNP   Advanced Practice Providers  2024 at 20:33 PM            [place] Measurements      Weight: 6 lb 2.8 oz Length: 1' 7\"     Head circumference: 31.8 cm           Skin to Skin and Feeding Plan      Skin to skin initiation date/time: 1841    Skin to skin with: Mother  Skin to skin end date/time:            Labor Events and Shoulder Dystocia    Fetal Tracing Prior to Delivery: Category 2  Shoulder dystocia present?: Neg       Delivery (Maternal) (Provider to Complete) ([id number])    Episiotomy: None  Perineal lacerations: 2nd Repaired?: Yes   Repair suture: 2-0 Vicryl  Genital tract inspection done: Pos       Blood Loss  Mother: Tere Alves #[id number]     Start of Mother's Information      Delivery Blood Loss  24 1330 - 24 2248      Delivery QBL (mL) Hospital Encounter 100 mL    Total  100 mL               End of Mother's Information  Mother: Tere Alves #[id number]      "           Delivery - Provider to Complete (820099)    Delivering clinician: Lorraine Trotter DO  Delivery Type (Choose the 1 that will go to the Birth History): Vaginal, Spontaneous                         Other personnel:  Provider Role   Hemanth Delatorre, RN Registered Nurse   Lorraine Younger RN                     Placenta    Date/Time: 2/24/2024  8:38 PM  Removal: Spontaneous  Disposition: Pathology             Anesthesia    Method: Epidural  Cervical dilation at placement: 0-3                    Presentation and Position    Presentation: Vertex    Position: Right Occiput Anterior                     Lorraine Trotter DO

## 2024-02-25 NOTE — PLAN OF CARE
Vital signs stable. Postpartum assessment WDL. L arm swollen from infiltrated IV. Warm compresses applied throughout the day, encouraged elevation, ace wrap to hand and arm as comfortable to patient. Pain controlled with tylenol and ibuprofen. Patient voiding without difficulty. Pumping for infant in NICU, discussed importance of pumping ever 3 hours. Patient has yet to see infant in NICU or call for update. Have offered numerous times throughout the day to wheel or walk patient and FOB to NICU. Phone number to NICU given to patient this AM. Writer has been asked for status updates on baby, has reiterated to patient that writer is not caring for infant and does not have status updates, suggested calling or visiting NICU for updates. Will continue with current plan of care.

## 2024-02-26 ENCOUNTER — ANESTHESIA EVENT (OUTPATIENT)
Dept: OBGYN | Facility: CLINIC | Age: 34
End: 2024-02-26
Payer: COMMERCIAL

## 2024-02-26 ENCOUNTER — ANESTHESIA (OUTPATIENT)
Dept: OBGYN | Facility: CLINIC | Age: 34
End: 2024-02-26
Payer: COMMERCIAL

## 2024-02-26 ENCOUNTER — APPOINTMENT (OUTPATIENT)
Dept: ULTRASOUND IMAGING | Facility: CLINIC | Age: 34
End: 2024-02-26
Attending: OBSTETRICS & GYNECOLOGY
Payer: COMMERCIAL

## 2024-02-26 PROBLEM — O24.419 GESTATIONAL DIABETES MELLITUS, CLASS A2: Status: ACTIVE | Noted: 2024-02-26

## 2024-02-26 LAB
ERYTHROCYTE [DISTWIDTH] IN BLOOD BY AUTOMATED COUNT: 15.8 % (ref 10–15)
GLUCOSE BLDC GLUCOMTR-MCNC: 73 MG/DL (ref 70–99)
HCT VFR BLD AUTO: 33 % (ref 35–47)
HGB BLD-MCNC: 11.2 G/DL (ref 11.7–15.7)
MCH RBC QN AUTO: 26.4 PG (ref 26.5–33)
MCHC RBC AUTO-ENTMCNC: 33.9 G/DL (ref 31.5–36.5)
MCV RBC AUTO: 78 FL (ref 78–100)
PLATELET # BLD AUTO: 178 10E3/UL (ref 150–450)
RBC # BLD AUTO: 4.25 10E6/UL (ref 3.8–5.2)
WBC # BLD AUTO: 9.5 10E3/UL (ref 4–11)

## 2024-02-26 PROCEDURE — 99223 1ST HOSP IP/OBS HIGH 75: CPT | Mod: AI | Performed by: INTERNAL MEDICINE

## 2024-02-26 PROCEDURE — 85027 COMPLETE CBC AUTOMATED: CPT | Performed by: INTERNAL MEDICINE

## 2024-02-26 PROCEDURE — 93971 EXTREMITY STUDY: CPT | Mod: LT

## 2024-02-26 PROCEDURE — 120N000012 HC R&B POSTPARTUM

## 2024-02-26 PROCEDURE — 36415 COLL VENOUS BLD VENIPUNCTURE: CPT | Performed by: INTERNAL MEDICINE

## 2024-02-26 PROCEDURE — 250N000013 HC RX MED GY IP 250 OP 250 PS 637: Performed by: OBSTETRICS & GYNECOLOGY

## 2024-02-26 RX ORDER — ACETAMINOPHEN 500 MG
1000 TABLET ORAL EVERY 6 HOURS PRN
Qty: 90 TABLET | Refills: 0 | Status: SHIPPED | OUTPATIENT
Start: 2024-02-26

## 2024-02-26 RX ORDER — IBUPROFEN 800 MG/1
800 TABLET, FILM COATED ORAL EVERY 8 HOURS PRN
Qty: 90 TABLET | Refills: 0 | Status: SHIPPED | OUTPATIENT
Start: 2024-02-26 | End: 2024-03-06

## 2024-02-26 RX ORDER — IBUPROFEN 400 MG/1
800 TABLET, FILM COATED ORAL 3 TIMES DAILY
Status: DISCONTINUED | OUTPATIENT
Start: 2024-02-27 | End: 2024-02-27 | Stop reason: HOSPADM

## 2024-02-26 RX ORDER — DOCUSATE SODIUM 100 MG/1
100 CAPSULE, LIQUID FILLED ORAL 2 TIMES DAILY PRN
Qty: 60 CAPSULE | Refills: 0 | Status: SHIPPED | OUTPATIENT
Start: 2024-02-26 | End: 2024-02-29

## 2024-02-26 RX ADMIN — ACETAMINOPHEN 650 MG: 325 TABLET, FILM COATED ORAL at 09:46

## 2024-02-26 RX ADMIN — IBUPROFEN 800 MG: 400 TABLET ORAL at 03:02

## 2024-02-26 RX ADMIN — DOCUSATE SODIUM 100 MG: 100 CAPSULE, LIQUID FILLED ORAL at 08:26

## 2024-02-26 RX ADMIN — IBUPROFEN 800 MG: 400 TABLET ORAL at 16:50

## 2024-02-26 RX ADMIN — ACETAMINOPHEN 650 MG: 325 TABLET, FILM COATED ORAL at 16:50

## 2024-02-26 RX ADMIN — ACETAMINOPHEN 650 MG: 325 TABLET, FILM COATED ORAL at 03:02

## 2024-02-26 RX ADMIN — IBUPROFEN 800 MG: 400 TABLET ORAL at 09:46

## 2024-02-26 ASSESSMENT — ACTIVITIES OF DAILY LIVING (ADL)
ADLS_ACUITY_SCORE: 18

## 2024-02-26 NOTE — PROGRESS NOTES
[facility]    Post-Partum Progress Note    Interval History   Doing well.  Pain is well-controlled.  No fevers.  No history of foul-smelling vaginal discharge.  Good appetite.  Denies chest pain, shortness of breath, nausea or vomiting.  Vaginal bleeding is similar to a heavy menstrual flow.  Ambulatory. Trialing breastfeeding + pumping. Patient has significant left hand swelling in location of previous infiltrated IV, now removed. Some pain and tightness of hand, limited range of motion.     Medications    - MEDICATION INSTRUCTIONS -      - MEDICATION INSTRUCTIONS -      oxytocin        docusate sodium  100 mg Oral Daily       Physical Exam   Temp: 97.4  F (36.3  C) Temp src: Oral BP: 116/70 Pulse: 75   Resp: 16        Vitals:    02/23/24 1532 02/26/24 1049   Weight: 82.1 kg (181 lb) 81.6 kg (179 lb 12.8 oz)     Vital Signs with Ranges  Temp:  [97.4  F (36.3  C)-98.1  F (36.7  C)] 97.4  F (36.3  C)  Pulse:  [65-81] 75  Resp:  [16] 16  BP: ()/(57-70) 116/70  No intake/output data recorded.    Uterine fundus is firm, non-tender and at the level of the umbilicus  Extremities Non-tender  Heart is regular rate and rhythm and lungs clear to auscultation  Upper extremities: Left upper extremity edematous to IV extravasation     Data   Recent Labs   Lab Test 02/23/24  1851   AS Negative     Recent Labs   Lab Test 02/25/24  0834 02/23/24  1851 12/13/23  0900 08/30/23  0814   WBC  --  7.4  --  7.9   HGB 12.1 12.5   < > 12.5    < > = values in this interval not displayed.     Recent Labs   Lab Test 08/30/23  0814   RUQIGG Positive       Results for orders placed or performed during the hospital encounter of 02/23/24   US Upper Extremity Venous Duplex Left    Narrative    US UPPER EXTREMITY VENOUS DUPLEX LEFT  2/26/2024 2:51 PM     HISTORY: Infiltrated IV now with consistent/persistent swelling of  hand.    COMPARISON: None.    TECHNIQUE: Color Doppler and spectral waveform analysis  obtained  throughout the deep and superficial veins of the left upper extremity.    FINDINGS: The left internal jugular, subclavian, axillary, and  brachial veins demonstrate normal blood flow, compression (where  applicable), and augmentation. Basilic vein is patent. Nonocclusive  thrombus noted in the cephalic vein of the mid upper arm. Area of  occlusive thrombus in a branch of the cephalic vein in the distal  forearm, site of previous IV.    Contralateral right internal jugular and subclavian veins are patent.      Impression    IMPRESSION: Negative for DVT in the left upper extremity. Superficial  thrombophlebitis in the cephalic vein with small focal area of  nonocclusive thrombus in the mid upper arm as well as occlusive  thrombus in the distal forearm branch of the left cephalic vein, near  site of previous IV.      YUDI ARCHIBALD MD         SYSTEM ID:  [id number]      Assessment & Plan   Assessment:  Post-partum day #2  Normal spontaneous vaginal delivery - PPROM with pregnancy complicated by GDMA2 and AMA    Doing well.  No excessive bleeding  Pain well-controlled.  increased pain and swelling of left hand at site of previous IV - US shows Negative for DVT in the left upper extremity. Superficial  thrombophlebitis in the cephalic vein with small focal area of  nonocclusive thrombus in the mid upper arm as well as occlusive  thrombus in the distal forearm branch of the left cephalic vein, near  site of previous IV.    Blood sugars within normal limits    Plan:  Ambulation encouraged in room and hallways  Breast feeding strategies discussed, Lactation consultation  Pain control measures as needed  Reportable signs and symptoms dicussed with the patient  Extravasation due to IV on left arm - Recommend elevating left arm, encouraging arm movement, and applying warm compresses. Will consult Hospitalist for further recs.    Anticipate discharge tomorrow following recs for left hand extravasation.    [place]  DESIREE Barboza

## 2024-02-26 NOTE — PLAN OF CARE
VSS and pain adequately-controlled with Tylenol/Ibuprofen.  Her left hand and wrist have 3+ edema and her left arm has 2+, post IV infiltration.  She c/o discomfort and was offered an ice pack, per PA recommendations.  US completed and Hospitalist consult ordered.  The pt is voiding adequately, ambulating independently to the NICU, and she's pumping every 2-3 hours.  Discharge cancelled for today, will continue to monitor

## 2024-02-26 NOTE — PLAN OF CARE
Vital signs stable. Postpartum assessment within normal limits. Pain controlled with tylenol and ibuprofen. Patient had infiltration of IV site. Very swollen and painful. Provided hot packs to relieve inflammation. Patient voiding without difficulty. Infant in NICU and encouraged to visit. Will continue with current plan of care.

## 2024-02-26 NOTE — ANESTHESIA POSTPROCEDURE EVALUATION
Patient: Tere Alves    Procedure: * No procedures listed *       Anesthesia Type:  Epidural    Note:  Disposition: Inpatient   Postop Pain Control: Uneventful            Sign Out: Well controlled pain   PONV: No   Neuro/Psych: Uneventful            Sign Out: Acceptable/Baseline neuro status   Airway/Respiratory: Uneventful            Sign Out: Acceptable/Baseline resp. status   CV/Hemodynamics: Uneventful            Sign Out: Acceptable CV status   Other NRE: NONE   DID A NON-ROUTINE EVENT OCCUR? No    Event details/Postop Comments:  Patient doing well.  Ambulating without difficulty. Denies residual numbness/weakness.  No complaints/concerns.           Last vitals:  Vitals:    02/25/24 2030 02/26/24 0300 02/26/24 0815   BP: 109/63 93/57 105/70   Pulse: 81 65    Resp: 16 16 16   Temp: 36.7  C (98.1  F) 36.5  C (97.7  F) 36.3  C (97.4  F)   SpO2:          Electronically Signed By: Teddy Cano MD  February 26, 2024  11:33 AM

## 2024-02-26 NOTE — PLAN OF CARE
The Hospitalist assessed the pt at the bedside and new orders received to schedule Ibuprofen, continue elevating extremity and apply warm packs, and AM labs ordered.  The pt was updated and all questions answered.  Dr. Kruger was also updated with the new POC

## 2024-02-26 NOTE — DISCHARGE INSTRUCTIONS
Warning Signs after Having a Baby    Keep this paper on your fridge or somewhere else where you can see it.    Call your provider if you have any of these symptoms up to 12 weeks after having your baby.    Thoughts of hurting yourself or your baby  Pain in your chest or trouble breathing  Severe headache not helped by pain medicine  Eyesight concerns (blurry vision, seeing spots or flashes of light, other changes to eyesight)  Fainting, shaking or other signs of a seizure    Call 9-1-1 if you feel that it is an emergency.     The symptoms below can happen to anyone after giving birth. They can be very serious. Call your provider if you have any of these warning signs.    Losing too much blood (hemorrhage)    Call your provider if you soak through a pad in less than an hour or pass blood clots bigger than a golf ball. These may be signs that you are bleeding too much.    Blood clots in the legs or lungs    After you give birth, your body naturally clots its blood to help prevent blood loss. Sometimes this increased clotting can happen in other areas of the body, like the legs or lungs. This can block your blood flow and be very dangerous.     Call your provider if you:  Have a red, swollen spot on the back of your leg that is warm or painful when you touch it.   Are coughing up blood.     Infection    Call your provider if you have any of these symptoms:  Fever of 100.4 F (38 C) or higher.  Pain or redness around your stitches if you had an incision.   Any yellow, white, or green fluid coming from places where you had stitches or surgery.    Mood Problems (postpartum depression)    Many people feel sad or have mood changes after having a baby. But for some people, these mood swings are worse.     Call your provider right away if you feel so anxious or nervous that you can't care for yourself or your baby.    Preeclampsia (high blood pressure)    Even if you didn't have high blood pressure when you were pregnant, you  "are at risk for the high blood pressure disease called preeclampsia. This risk can last up to 12 weeks after giving birth.     Call your provider if you have:   Pain on your right side under your rib cage  Sudden swelling in the hands and face    Remember: You know your body. If something doesn't feel right, get medical help.     For informational purposes only. Not to replace the advice of your health care provider. Copyright 2020 [facility]. All rights reserved. Clinically reviewed by Terese Desir, RNC-OB, MSN. [employer] 454876 - Rev .    Postpartum: Care Instructions  Overview  After childbirth (postpartum period), your body goes through many changes. Some of these changes happen over several weeks. In the hours after delivery, your body will begin to recover from childbirth while it prepares to breastfeed your . You may feel emotional during this time. Your hormones can shift your mood without warning for no clear reason.  In the first couple of weeks after childbirth, it's common to have emotions that change from happy to sad. You may find it hard to sleep. You may cry a lot. This is called the \"baby blues.\" These overwhelming emotions often go away within a couple of days or weeks. But it's important to discuss your feelings with your doctor.  It's easy to get too tired and overwhelmed during the first weeks after childbirth. Don't try to do too much. Get rest whenever you can, accept help from others, and eat well and drink plenty of fluids.  In the first couple of weeks after you give birth, your doctor or midwife may want to check in with you and make a plan for any follow-up care you may need. You will likely have a complete postpartum visit in the first 3 months after delivery. At that time, your doctor or midwife will check on your recovery from childbirth and see how you're doing with your emotions. You may also discuss your concerns or questions.  Follow-up care is a key " part of your treatment and safety. Be sure to make and go to all appointments, and call your doctor if you are having problems. It's also a good idea to know your test results and keep a list of the medicines you take.  How can you care for yourself at home?  Sleep or rest when your baby sleeps.  Get help with household chores from family or friends, if you can. Don't try to do it all yourself.  If you have hemorrhoids or swelling or pain around the opening of your vagina, try using cold and heat. You can put ice or a cold pack on the area for 10 to 20 minutes at a time. Put a thin cloth between the ice and your skin. Also try sitting in a few inches of warm water (sitz bath) 3 times a day and after bowel movements.  Take pain medicines exactly as directed.  If the doctor gave you a prescription medicine for pain, take it as prescribed.  If you are not taking a prescription pain medicine, ask your doctor if you can take an over-the-counter medicine.  Eat more fiber to avoid constipation. Include foods such as whole-grain breads and cereals, raw vegetables, raw and dried fruits, and beans.  Drink plenty of fluids. If you have kidney, heart, or liver disease and have to limit fluids, talk with your doctor before you increase the amount of fluids you drink.  Do not rinse inside your vagina with fluids (douche).  If you have stitches, keep the area clean by pouring or spraying warm water over the area outside your vagina and anus after you use the toilet.  Keep a list of questions to ask your doctor or midwife. Your questions might be about:  Changes in your breasts, such as lumps or soreness.  When to expect your menstrual period to start again.  What form of birth control is best for you.  Weight you have put on during the pregnancy.  Exercise options.  What foods and drinks are best for you, especially if you are breastfeeding.  Problems you might be having with breastfeeding.  When you can have sex. You may want to  talk about lubricants for your vagina.  Any feelings of sadness or restlessness that you are having.  When should you call for help?  Share this information with your partner, family, or a friend. They can help you watch for warning signs.  Call 911  anytime you think you may need emergency care. For example, call if:    You have thoughts of harming yourself, your baby, or another person.     You passed out (lost consciousness).     You have chest pain, are short of breath, or cough up blood.     You have a seizure.   Where to get help 24 hours a day, 7 days a week   If you or someone you know talks about suicide, self-harm, a mental health crisis, a substance use crisis, or any other kind of emotional distress, get help right away. You can:    Call the Suicide and Crisis Lifeline at 988.     Call 3-679-737-TALK (1-230.772.1461).     Text HOME to 790759 to access the Crisis Text Line.   Consider saving these numbers in your phone.  Go to [employer] for more information or to chat online.  Call your doctor now or seek immediate medical care if:    You have signs of hemorrhage (too much bleeding), such as:  Heavy vaginal bleeding. This means that you are soaking through one or more pads in an hour. Or you pass blood clots bigger than an egg.  Feeling dizzy or lightheaded, or you feel like you may faint.  Feeling so tired or weak that you cannot do your usual activities.  A fast or irregular heartbeat.  New or worse belly pain.     You have signs of infection, such as:  A fever.  Frequent or painful urination or blood in your urine.  Vaginal discharge that smells bad.  New or worse belly pain.     You have symptoms of a blood clot in your leg (called a deep vein thrombosis), such as:  Pain in the calf, back of the knee, thigh, or groin.  Swelling in the leg or groin.  A color change on the leg or groin. The skin may be reddish or purplish, depending on your usual skin color.     You have signs of preeclampsia,  "such as:  Sudden swelling of your face, hands, or feet.  New vision problems (such as dimness, blurring, or seeing spots).  A severe headache.     You have signs of heart failure, such as:  New or increased shortness of breath.  New or worse swelling in your legs, ankles, or feet.  Sudden weight gain, such as more than 2 to 3 pounds in a day or 5 pounds in a week.  Feeling so tired or weak that you cannot do your usual activities.     You had spinal or epidural pain relief and have:  New or worse back pain.  Increased pain, swelling, warmth, or redness at the injection site.  Tingling, weakness, or numbness in your legs or groin.   Watch closely for changes in your health, and be sure to contact your doctor if:    Your vaginal bleeding isn't decreasing.     You feel sad, anxious, or hopeless for more than a few days.     You are having problems with your breasts or breastfeeding.   Where can you learn more?  Go to https://www.[employer].net/patiented  Enter Z768 in the search box to learn more about \"Postpartum: Care Instructions.\"  Current as of: July 11, 2023               Content Version: 13.8    7401-9273 [employer].   Care instructions adapted under license by your healthcare professional. If you have questions about a medical condition or this instruction, always ask your healthcare professional. [employer] disclaims any warranty or liability for your use of this information.              "

## 2024-02-27 ENCOUNTER — APPOINTMENT (OUTPATIENT)
Dept: ULTRASOUND IMAGING | Facility: CLINIC | Age: 34
End: 2024-02-27
Attending: STUDENT IN AN ORGANIZED HEALTH CARE EDUCATION/TRAINING PROGRAM
Payer: COMMERCIAL

## 2024-02-27 VITALS
SYSTOLIC BLOOD PRESSURE: 109 MMHG | TEMPERATURE: 97.9 F | OXYGEN SATURATION: 96 % | HEART RATE: 68 BPM | BODY MASS INDEX: 33.16 KG/M2 | HEIGHT: 62 IN | WEIGHT: 180.2 LBS | RESPIRATION RATE: 18 BRPM | DIASTOLIC BLOOD PRESSURE: 72 MMHG

## 2024-02-27 LAB
ERYTHROCYTE [DISTWIDTH] IN BLOOD BY AUTOMATED COUNT: 15.5 % (ref 10–15)
HCT VFR BLD AUTO: 34.2 % (ref 35–47)
HGB BLD-MCNC: 11.3 G/DL (ref 11.7–15.7)
MCH RBC QN AUTO: 25.6 PG (ref 26.5–33)
MCHC RBC AUTO-ENTMCNC: 33 G/DL (ref 31.5–36.5)
MCV RBC AUTO: 78 FL (ref 78–100)
PLATELET # BLD AUTO: 172 10E3/UL (ref 150–450)
RBC # BLD AUTO: 4.41 10E6/UL (ref 3.8–5.2)
WBC # BLD AUTO: 9 10E3/UL (ref 4–11)

## 2024-02-27 PROCEDURE — 85048 AUTOMATED LEUKOCYTE COUNT: CPT | Performed by: INTERNAL MEDICINE

## 2024-02-27 PROCEDURE — 93970 EXTREMITY STUDY: CPT

## 2024-02-27 PROCEDURE — 99233 SBSQ HOSP IP/OBS HIGH 50: CPT | Performed by: STUDENT IN AN ORGANIZED HEALTH CARE EDUCATION/TRAINING PROGRAM

## 2024-02-27 PROCEDURE — 250N000013 HC RX MED GY IP 250 OP 250 PS 637: Performed by: INTERNAL MEDICINE

## 2024-02-27 PROCEDURE — 250N000013 HC RX MED GY IP 250 OP 250 PS 637: Performed by: OBSTETRICS & GYNECOLOGY

## 2024-02-27 PROCEDURE — 36415 COLL VENOUS BLD VENIPUNCTURE: CPT | Performed by: INTERNAL MEDICINE

## 2024-02-27 RX ADMIN — IBUPROFEN 800 MG: 400 TABLET ORAL at 09:06

## 2024-02-27 RX ADMIN — DOCUSATE SODIUM 100 MG: 100 CAPSULE, LIQUID FILLED ORAL at 09:06

## 2024-02-27 RX ADMIN — IBUPROFEN 800 MG: 400 TABLET ORAL at 15:45

## 2024-02-27 RX ADMIN — ACETAMINOPHEN 650 MG: 325 TABLET, FILM COATED ORAL at 10:15

## 2024-02-27 RX ADMIN — ACETAMINOPHEN 650 MG: 325 TABLET, FILM COATED ORAL at 15:43

## 2024-02-27 RX ADMIN — ACETAMINOPHEN 650 MG: 325 TABLET, FILM COATED ORAL at 05:31

## 2024-02-27 ASSESSMENT — ACTIVITIES OF DAILY LIVING (ADL)
ADLS_ACUITY_SCORE: 18

## 2024-02-27 NOTE — PROGRESS NOTES
[facility]    Medicine Progress Note - Hospitalist Service    Date of Admission:  2/23/2024    Assessment & Plan     Tere Alves is a 34 year old female who was admitted on 2/23/2024. Hospitalist group asked to see the patient for IV site associated left upper extremity cephalic vein superficial thrombophlebitis.     Postpartum day 2  Status postnormal spontaneous vaginal delivery  Her pregnancy was complicated by gestational diabetes  Doing well and ready to discharge from the standpoint.     Left upper extremity cephalic vein superficial thrombophlebitis-line related   -Discussed case with patient's RN and patient.  -Left upper extremity swelling noted evening of 2/24. IV site noted infiltrate a.m. 2/25.  Patient had Pitocin running at that time.  Medication stopped.  IV removed a.m 2/25  -Patient has been receiving ibuprofen as needed approximately 3 doses per day 800 mg for cramps  -Had warm compresses left upper extremity several times a day with mild arm elevation.  -Left upper extremity ultrasound obtained given persistent edema and swelling this show-   Negative for DVT in the left upper extremity. Superficial  thrombophlebitis in the cephalic vein with small focal area of  nonocclusive thrombus in the mid upper arm as well as occlusive  thrombus in the distal forearm branch of the left cephalic vein, near site of previous IV.    -Patient without history of DVT or PE.  No family history of DVT or PE.  -post partum state hypercoaguable, also had peripheral IV near site of superficial thrombophlebitis   -Elevated left arm overnight 2/26-2/27 with significant improvement of left upper extremity edema. Areas of redness which were marked off have receded. Palpable 2+ radial pulse. Strength and sensation intact    Plan:  - Break from left upper extremity elevation this morning  - Apply warm compress  - Obtain US of bilateral lower extremities to rule out DVT, if negative patient would  "be medically cleared for discharge  - Instruct patient to return for persistent or worsening left upper extremity swelling, pain, or warmth  - Continue intermittent warm compress and left upper extremity elevation at home and until PCP follow up  - Placed referral to internal medicine clinic to establish PCP in 1-2 weeks and to follow up on left upper extremity swelling and to consider hypercoagulable work up. Recommend repeating left upper extremity US at PCP follow up appointment   - On discharge, recommend continuing scheduled ibuprofen, scheduled with PPI and warm compresses           Diet: Regular Diet Adult  Room Service  NPO per Anesthesia Guidelines for Procedure/Surgery Except for: Meds    DVT Prophylaxis: Ambulatory   Cristina Catheter: Not present  Lines: None     Cardiac Monitoring: None  Code Status:  Full Code     Clinically Significant Risk Factors                         # Obesity: Estimated body mass index is 32.89 kg/m  as calculated from the following:    Height as of this encounter: 1.575 m (5' 2\").    Weight as of this encounter: 81.6 kg (179 lb 12.8 oz)., PRESENT ON ADMISSION            Disposition Plan     Expected Discharge Date: 02/27/2024      Destination: home with family              Seamus Perry DO  Hospitalist Service  [facility]  Securely message with [employer] (more info)  Text page via Swype Paging/Directory   ______________________________________________________________________    Interval History     - No acute events overnight  - Significant improvement of left upper extremity swelling and redness with elevation overnight  - Patient reports her pain is currently controlled  - Denies personal or family history of blood clots  - Denies shortness of breath  - No fevers, area of redness receded from drawn marker   - No other acute concerns at this time     Physical Exam   Vital Signs: Temp: 97.9  F (36.6  C) Temp src: Oral BP: 109/72 Pulse: 68   Resp: 18      "   Weight: 179 lbs 12.8 oz    Constitutional: Sitting up in bed no acute distress nontoxic-appearing  Eyes: Normal sclera  HEENT: Normocephalic atraumatic  Respiratory: Clear to auscultation bilaterally  Cardiovascular: Regular rate and rhythm no rubs gallops or murmurs appreciated  No left upper extremity venous cord appreciated in the forearm and upper arm. 2+ radial pulse  GI: Soft nontender nondistended  Lymph/Hematologic: No cervical or left axillary lymphadenopathy  Skin: Trace swelling to 1+ swelling of left hand up to mid upper arm.  No warmth.  No venous cord appreciated. Erythema no longer present over area marked off with pen.  Musculoskeletal: No focal joint swelling or pain with passive range of motion of major joints in the left upper extremity. No lower extremity swelling or leg tenderness with palpation   Neurologic: Normal speech mentation.  Strength 5 out of 5 in extremities bilateral upper extremities    Medical Decision Making       55 MINUTES SPENT BY ME on the date of service doing chart review, history, exam, documentation & further activities per the note.      Data   ------------------------- PAST 24 HR DATA REVIEWED -----------------------------------------------    I have personally reviewed the following data over the past 24 hrs:    9.0  \   11.3 (L)   / 172     N/A N/A N/A /  N/A   N/A N/A N/A \       Imaging results reviewed over the past 24 hrs:   Recent Results (from the past 24 hour(s))   US Upper Extremity Venous Duplex Left    Narrative    US UPPER EXTREMITY VENOUS DUPLEX LEFT  2/26/2024 2:51 PM     HISTORY: Infiltrated IV now with consistent/persistent swelling of  hand.    COMPARISON: None.    TECHNIQUE: Color Doppler and spectral waveform analysis obtained  throughout the deep and superficial veins of the left upper extremity.    FINDINGS: The left internal jugular, subclavian, axillary, and  brachial veins demonstrate normal blood flow, compression (where  applicable), and  augmentation. Basilic vein is patent. Nonocclusive  thrombus noted in the cephalic vein of the mid upper arm. Area of  occlusive thrombus in a branch of the cephalic vein in the distal  forearm, site of previous IV.    Contralateral right internal jugular and subclavian veins are patent.      Impression    IMPRESSION: Negative for DVT in the left upper extremity. Superficial  thrombophlebitis in the cephalic vein with small focal area of  nonocclusive thrombus in the mid upper arm as well as occlusive  thrombus in the distal forearm branch of the left cephalic vein, near  site of previous IV.      YUDI ARCHIBALD MD         SYSTEM ID:  [id number]

## 2024-02-27 NOTE — PROGRESS NOTES
[facility]    Post-Partum Progress Note    Interval History   Doing well.  Still has significant left hand swelling in place of prior IV and pain with mild erythema marked by hospitalist yesterday. Still some difficulty clenching fist. Pain is well-controlled.  No fevers.  No history of foul-smelling vaginal discharge.  Good appetite.  Denies chest pain, shortness of breath, nausea or vomiting.  Vaginal bleeding is similar to a heavy menstrual flow.  Ambulatory. Trialing breastfeeding + pumping. Baby in NICU, visiting when possible.     Medications    - MEDICATION INSTRUCTIONS -      - MEDICATION INSTRUCTIONS -      oxytocin        docusate sodium  100 mg Oral Daily    ibuprofen  800 mg Oral TID       Physical Exam   Temp: 97.9  F (36.6  C) Temp src: Oral BP: 109/72 Pulse: 68   Resp: 18        Vitals:    02/23/24 1532 02/26/24 1049 02/27/24 0904   Weight: 82.1 kg (181 lb) 81.6 kg (179 lb 12.8 oz) 81.7 kg (180 lb 3.2 oz)     Vital Signs with Ranges  Temp:  [97.4  F (36.3  C)-98.1  F (36.7  C)] 97.9  F (36.6  C)  Pulse:  [65-75] 68  Resp:  [16-18] 18  BP: (109-117)/(66-72) 109/72  No intake/output data recorded.    Uterine fundus is firm, non-tender and at the level of the umbilicus  Extremities Non-tender, without erythema  Upper extremities: Left upper extremity edematous to IV extravasation     Data   Recent Labs   Lab Test 02/23/24  1851   AS Negative     Recent Labs   Lab Test 02/27/24  0715 02/26/24  1916   WBC 9.0 9.5   HGB 11.3* 11.2*     Recent Labs   Lab Test 08/30/23  0814   RUQIGG Positive       Results for orders placed or performed during the hospital encounter of 02/23/24   US Upper Extremity Venous Duplex Left    Narrative    US UPPER EXTREMITY VENOUS DUPLEX LEFT  2/26/2024 2:51 PM     HISTORY: Infiltrated IV now with consistent/persistent swelling of  hand.    COMPARISON: None.    TECHNIQUE: Color Doppler and spectral waveform analysis obtained  throughout the deep and  superficial veins of the left upper extremity.    FINDINGS: The left internal jugular, subclavian, axillary, and  brachial veins demonstrate normal blood flow, compression (where  applicable), and augmentation. Basilic vein is patent. Nonocclusive  thrombus noted in the cephalic vein of the mid upper arm. Area of  occlusive thrombus in a branch of the cephalic vein in the distal  forearm, site of previous IV.    Contralateral right internal jugular and subclavian veins are patent.      Impression    IMPRESSION: Negative for DVT in the left upper extremity. Superficial  thrombophlebitis in the cephalic vein with small focal area of  nonocclusive thrombus in the mid upper arm as well as occlusive  thrombus in the distal forearm branch of the left cephalic vein, near  site of previous IV.      YUDI ARCHIBALD MD         SYSTEM ID:  [id number]      Assessment & Plan   Assessment:  Post-partum day #3  Normal spontaneous vaginal delivery - PPROM with pregnancy complicated by GDMA2 and AMA    Doing well.  No excessive bleeding  Pain well-controlled.  increased pain and swelling of left hand at site of previous IV - US shows Negative for DVT in the left upper extremity. Superficial  thrombophlebitis in the cephalic vein with small focal area of  nonocclusive thrombus in the mid upper arm as well as occlusive  thrombus in the distal forearm branch of the left cephalic vein, near  site of previous IV.    Blood sugars within normal limits    Plan:  Awaiting lower extremity US results ordered by consulting hospitalist to rule out DVT.     Ambulation encouraged in room and hallways  Breast feeding strategies discussed, Lactation consultation  Pain control measures as needed  Reportable signs and symptoms dicussed with the patient    Anticipate discharge today following clearance by hospitalist    Sahara Barboza PA-C

## 2024-02-27 NOTE — PLAN OF CARE
Vitals within defined limits. Fundus firm. Lochia scant. Using Tylenol & Motrin for perineal soreness & uterine cramping with good relief. Using ice packs/tucks for comfort. L wrist/arm elevated in sleeve overnight d/t edema after iv infiltrated. Swelling improved this am. Pt still unable to clench fist, otherwise, CMS is intact. +2 edema noted. Voiding without issues. Up ad deepali. Encouraging patient to pump q 3 hrs. Visited  in NICU x2. Encouraged to call with questions/concerns. Spouse at bedside and supportive.

## 2024-02-27 NOTE — LACTATION NOTE
"Lactation visit with Tere, significant other Jeffrey. Baby boy in NICU and starting to take bottles per parents. Tere shared she's been pumping but that her milk isn't coming in yet. Reassured we'd expect mature milk to take 3-5 days to transition and encouraged her to keep working on pumping every 3 hours, which she reports she's been doing. Also encouraged spending time skin to skin with baby as much as possible and discussed working on latching and breastfeeding with baby boy as he is ready and showing feeding cues. Reviewed goal to pump at least 8 times in each 24 hours when establishing milk supply.     Discussed hands on pumping. Reviewed Medela symphony settings with Tere and encouraged use of initiate mode. Discussed initiate vs maintenance mode with Medela symphony pump and when to change to maintenance mode. Encouraged Tere to get a hands free bra for use with pumping.  Tere has a Spectra pump for home use. Encouraged to review [employer]'s online patient education videos to learn her home pump. \"Milk Making Reminders\" and \"Pump volume log\" given and reviewed. Encouraged watching \"[place] Maximizing Milk\" video online. Made Tere aware Lactation can continue to support as infant continues care in NICU. Lactation outpatient resources reviewed. Tere & Jeffrey appreciative of visit and Lactation support.    Sobeida Addison, RN, BSN, MNN, IBCLC    "

## 2024-02-27 NOTE — PLAN OF CARE
Patient up independently, voiding without difficulty, taking ibuprofen and tylenol for pain with relief. Ready for discharge. Discharge instructions reviewed with patient, patient verbalized understanding of self and follow-up needs.  Patient instructed to establish a primary care provider at the internal medicine clinic (clinic information provided) and follow up within two to three days for follow up on her left arm swelling and thrombus and to follow up with her OB at six weeks.  Patient states understanding.

## 2024-02-27 NOTE — CONSULTS
[facility]    Hospitalist Consultation    Date of Admission:  2/23/2024    Assessment & Plan   Tere Alves is a 34 year old female who was admitted on 2/23/2024. I was asked to see the patient for IV site associated left upper extremity cephalic vein superficial thrombophlebitis    Postpartum day 2  Status postnormal spontaneous vaginal delivery  Her pregnancy was complicated by gestational diabetes  Doing well and ready to discharge from the standpoint.    Left upper extremity cephalic vein superficial thrombophlebitis-line related   -Discussed case with patient's RN and patient.  -Left upper extremity swelling noted evening of 2/24. IV site noted infiltrate a.m. 2/25.  Patient had Pitocin running at that time.  Medication stopped.  IV removed a.m 2/25  -Patient has been receiving ibuprofen as needed approximately 3 doses per day 800 mg for cramps  -Had warm compresses left upper extremity several times a day with mild arm elevation.  -Left upper extremity ultrasound obtained given persistent edema and swelling this show-   Negative for DVT in the left upper extremity. Superficial  thrombophlebitis in the cephalic vein with small focal area of  nonocclusive thrombus in the mid upper arm as well as occlusive  thrombus in the distal forearm branch of the left cephalic vein, near site of previous IV.    -On exam patient has extensive swelling of the left upper extremity hand to upper arm.  Mild redness over the dorsum of the hand and forearm but no warmth.  No venous cord.  No pain no paresthesias or weakness.  No cyanosis.  Good cap refill  -Appears to be no cellulitis.  Redness may be simply from thrombophlebitis.  -Patient without history of DVT or PE.  No family history of DVT or PE.  -post partum state hypercoaguable    Plan-patient will need much more significant arm elevation to improve her edema.  Discussed this with the patient and RN at the bedside in detail.  Will beverly off the  edge of the redness.  Will check CBC with platelet count now.  Will schedule ibuprofen 3 times daily for now.  Patient is tolerating.  Warm compresses every 4 hours for now.  -cms checks  -Will need close follow-up with OB in clinic to ensure continued improvement.  May need close follow-up ultrasound left upper extremity in several days.  -Will hold on anticoagulation at this time though this may need to be considered in consultation with hematology if she continues to have significant swelling or develops pain.  Will reassess in the morning.  -Will need to discharge on arm elevation ibuprofen scheduled with PPI and warm compresses    Addendum- nl cbc platelet count. Nl wbc      DVT Prophylaxis: Patient is ambulatory  Code Status: Prior    Disposition: Expected discharge in likely discharge tomorrow    Denny Brown MD, MD    Reason for Consult   Reason for consult: I was asked by Dr. Barboza to evaluate this patient for left upper extremity superficial thrombophlebitis.    Primary Care Physician   Evy Murphy    Chief Complaint   Left upper extremity catheter associated left cephalic thrombophlebitis    History is obtained from the patient, hospital nurse, patient    History of Present Illness   Tere Alves is a 34 year old female with history of gestational diabetes who presented to [facility] on 2/23 in labor.  Patient delivered by spontaneous vaginal delivery on 2/24.  Vital signs within normal limits.  Blood sugar on 2/23 114.  Ketones 0.6.  Normal CBC with platelets and hemoglobin on admission.  Patient noted left arm swelling on 2/24.  Swelling in the arm increased overnight.  IV noted to be infiltrated morning of 2/25 and IV removed.  Patient had Pitocin infusing.  She had significant swelling in the left upper extremities with infiltration of IV.  This has been stable since IV removal.  She noted some slight redness over the dorsum of the hand and distal forearm which has  been nontender.  No change.  No arm pain or paresthesias.  No axillary pain.  No history of DVT or PE.  No family history of DVT or PE.  No chest pain or shortness of breath.  She has been feeling well.  She has been receiving about 3 doses of 800 mg ibuprofen for cramps.  She has had warm compresses and mild left upper extremity elevation in the last 24 hours.        Past Medical History    I have reviewed this patient's medical history and updated it with pertinent information if needed.   Past Medical History:   Diagnosis Date    Abnormal uterine bleeding     Anemia        Past Surgical History   I have reviewed this patient's surgical history and updated it with pertinent information if needed.  Past Surgical History:   Procedure Laterality Date    OPERATIVE HYSTEROSCOPY N/A 2021    Procedure: HYSTEROSCOPY, THERAPEUTIC with [employer];  Surgeon: Rich Thorne MD;  Location:  OR       Prior to Admission Medications   Prior to Admission Medications   Prescriptions Last Dose Informant Patient Reported? Taking?   Alcohol Swabs PADS   No No   Si each daily   Insulin Pen Needle (PEN NEEDLES) 32G X 4 MM MISC   No No   Si each daily   Prenatal Vit-Fe Fumarate-FA (PRENATAL VITAMIN PO) 2024  Yes Yes   acetone urine (KETOSTIX) test strip   No No   Sig: Use 1 strip daily to check urine ketones.   blood glucose (NO BRAND SPECIFIED) test strip   No No   Sig: Use to test blood sugar 4 times daily or as directed. To accompany: Blood Glucose Monitor Brands: per insurance.   blood glucose monitoring (NO BRAND SPECIFIED) meter device kit   No No   Sig: Use to test blood sugar 4 times daily or as directed. Preferred blood glucose meter OR supplies to accompany: Blood Glucose Monitor Brands: per insurance.   clomiPHENE (CLOMID) 50 MG tablet   No No   Sig: Take 1 tablet (50 mg) by mouth daily Start on day 3 of your cycle   Patient not taking: Reported on 2023   insulin NPH (HUMULIN N KWIKPEN) 100 UNIT/ML  injection 2/22/2024  No No   Sig: Inject 12 Units Subcutaneous at bedtime If fasting BG 95 or greater x 2 days, increase dose by 2u every 2 days until BG <95   insulin aspart (NOVOLOG FLEXPEN) 100 UNIT/ML pen 2/22/2024  No No   Sig: Inject 12 Units Subcutaneous daily (with dinner)   Patient taking differently: Inject 8 Units Subcutaneous daily (with dinner)   thin (NO BRAND SPECIFIED) lancets   No No   Sig: Use with lanceting device. To accompany: Blood Glucose Monitor Brands: per insurance.      Facility-Administered Medications: None     Allergies   No Known Allergies    Social History   I have reviewed this patient's social history and updated it with pertinent information if needed. Tere Alves  reports that she has never smoked. She has never used smokeless tobacco. She reports that she does not currently use alcohol. She reports that she does not use drugs.    Family History   I have reviewed this patient's family history and updated it with pertinent information if needed.   Family History   Problem Relation Age of Onset    Hypertension Mother     Diabetes No family hx of     Myocardial Infarction No family hx of     Cerebrovascular Disease No family hx of     Coronary Artery Disease Early Onset No family hx of     Breast Cancer No family hx of     Ovarian Cancer No family hx of     Colon Cancer No family hx of        Review of Systems   The 10 point Review of Systems is negative other than noted in the HPI or here.     Physical Exam   Temp: 97.4  F (36.3  C) Temp src: Oral BP: 116/70 Pulse: 75   Resp: 16        Vital Signs with Ranges  Temp:  [97.4  F (36.3  C)-98.1  F (36.7  C)] 97.4  F (36.3  C)  Pulse:  [65-81] 75  Resp:  [16] 16  BP: ()/(57-70) 116/70  179 lbs 12.8 oz    Constitutional: Sitting up in bed no acute distress nontoxic-appearing  Eyes: Normal sclera  HEENT: Normocephalic atraumatic  Respiratory: Clear to auscultation bilaterally  Cardiovascular: Regular rate and rhythm no rubs  gallops or murmurs appreciated  No left upper extremity venous cord appreciated in the forearm and upper arm  GI: Soft nontender nondistended  Lymph/Hematologic: No cervical or left axillary lymphadenopathy  Skin: Diffuse swelling approximately 2-3+ of the hand forearm extending to the upper arm.  Mild redness over the dorsum of the hand and midway up the left forearm.  No warmth.  No venous cord appreciated  Musculoskeletal: No focal joint swelling or pain with passive range of motion of major joints in the left upper extremity  Neurologic: Normal speech mentation.  Strength 5 out of 5 in extremities bilateral upper extremities    Psychiatric: Normal affect    Data   -Data reviewed today: All pertinent laboratory and imaging results from this encounter were reviewed. I personally reviewed laboratory studies and imaging studies performed in the hospital  Recent Labs   Lab 02/26/24  0708 02/25/24  0834 02/25/24  0616 02/24/24  1937 02/23/24 1949 02/23/24  1851   WBC  --   --   --   --   --  7.4   HGB  --  12.1  --   --   --  12.5   MCV  --   --   --   --   --  76*   PLT  --   --   --   --   --  152   GLC 73  --  78 84   < >  --     < > = values in this interval not displayed.       Imaging:  Recent Results (from the past 24 hour(s))   US Upper Extremity Venous Duplex Left    Narrative    US UPPER EXTREMITY VENOUS DUPLEX LEFT  2/26/2024 2:51 PM     HISTORY: Infiltrated IV now with consistent/persistent swelling of  hand.    COMPARISON: None.    TECHNIQUE: Color Doppler and spectral waveform analysis obtained  throughout the deep and superficial veins of the left upper extremity.    FINDINGS: The left internal jugular, subclavian, axillary, and  brachial veins demonstrate normal blood flow, compression (where  applicable), and augmentation. Basilic vein is patent. Nonocclusive  thrombus noted in the cephalic vein of the mid upper arm. Area of  occlusive thrombus in a branch of the cephalic vein in the distal  forearm,  site of previous IV.    Contralateral right internal jugular and subclavian veins are patent.      Impression    IMPRESSION: Negative for DVT in the left upper extremity. Superficial  thrombophlebitis in the cephalic vein with small focal area of  nonocclusive thrombus in the mid upper arm as well as occlusive  thrombus in the distal forearm branch of the left cephalic vein, near  site of previous IV.      YUDI ARCHIBALD MD         SYSTEM ID:  [id number]

## 2024-02-29 ENCOUNTER — OFFICE VISIT (OUTPATIENT)
Dept: FAMILY MEDICINE | Facility: CLINIC | Age: 34
End: 2024-02-29
Payer: COMMERCIAL

## 2024-02-29 VITALS
BODY MASS INDEX: 33 KG/M2 | HEART RATE: 67 BPM | SYSTOLIC BLOOD PRESSURE: 122 MMHG | HEIGHT: 61 IN | RESPIRATION RATE: 16 BRPM | TEMPERATURE: 97.5 F | DIASTOLIC BLOOD PRESSURE: 72 MMHG | WEIGHT: 174.8 LBS | OXYGEN SATURATION: 100 %

## 2024-02-29 DIAGNOSIS — R60.0 EDEMA OF BOTH LEGS: Primary | ICD-10-CM

## 2024-02-29 DIAGNOSIS — R60.0 EDEMA OF LEFT UPPER EXTREMITY: ICD-10-CM

## 2024-02-29 PROCEDURE — 99213 OFFICE O/P EST LOW 20 MIN: CPT | Performed by: PHYSICIAN ASSISTANT

## 2024-02-29 NOTE — PATIENT INSTRUCTIONS
Daysi Carranza,    Thank you for allowing [facility] to manage your care.    Lets repeat ultrasounds of your legs and left arm sometime next week. Continue taking ibuprofen and elevating your arm and legs as previously instructed.    If you develop worsening/changing symptoms at any time please be seen in clinic/urgent care or call 911/go to the emergency department for evaluation as we discussed.    I ordered ultrasounds. Please call diagnostic imaging [phone] to schedule your test.    I made a referral to the lactation specialist. They will be calling in approximately 1 week to set up your appointment.  If you do not hear from them, please call the specialty number on your after visit summary.     Please allow 1-2 business days for our office to contact you in regards to your laboratory/radiological studies.  If not done so, I encourage you to login into [employer] ([email or web address] to review your results as well.     If you have any questions or concerns, please feel free to call us at [phone]    Sincerely,    Aly Martinez PA-C    Did you know?      You can schedule a video visit for follow-up appointments as well as future appointments for certain conditions.  Please see the below link.     [email or web address]    If you have not already done so,  I encourage you to sign up for [employer] ([email or web address]).  This will allow you to review your results, securely communicate with a provider, and schedule virtual visits as well.

## 2024-02-29 NOTE — PROGRESS NOTES
Assessment & Plan   Problem List Items Addressed This Visit    None  Visit Diagnoses       Edema of both legs    -  Primary    Edema of left upper extremity               Patient presents for evaluation of swelling in her left upper extremity and bilateral lower extremities. She recently gave birth at [facility] on 2/24/24 at which time an infusion of pitocin infiltrated at the site of her left forearm peripheral IV. Ultrasound completed on 2/26 was negative for DVT but showed superficial thrombophlebitis in the cephalic vein, a small focal area of nonocclusive thrombus in the mid upper arm, and an occlusive thrombus in the distal forearm branch of the cephalic vein. She also had an ultrasound of her bilateral lower extremities completed on 2/27/24 that showed no evidence of DVT. On exam of LUE, left forearm and hand are moderately edematous and mildly tender with palpation, and there is mild erythema over the dorsal aspect of the hand and wrist. LUE ROM, strengths, and pulses intact. On exam of BLE, mild edema of bilateral legs, ankles, and feet, but no pain with palpation, skin changes, palpable masses, or limited ROM. Low concern for new DVTs or new/worsening occlusions in LUE, given that patient's symptoms are overall stable and she is not experiencing any new or worsening pain. If symptoms do not improve over the next 1-2 weeks, will repeat ultrasound imaging of LUE and BLE. Advised patient to follow up in urgent care/emergency department with any changing or worsening symptoms.     Complete history and physical exam as below. Afebrile with normal vital signs.    DDx and Dx discussed with and explained to the pt to their satisfaction.  All questions were answered at this time. Pt expressed understanding of and agreement with this dx, tx, and plan. No further workup warranted and standard medication warnings given. I have given the patient a list of pertinent indications for re-evaluation.  "Will go to the Emergency Department if symptoms worsen or new concerning symptoms arise. Patient left in no apparent distress.     Ordering of each unique test  23 minutes spent by me on the date of the encounter doing chart review, history and exam, documentation and further activities per the note     BMI  Estimated body mass index is 33.13 kg/m  as calculated from the following:    Height as of this encounter: 1.547 m (5' 0.91\").    Weight as of this encounter: 79.3 kg (174 lb 12.8 oz).     See Patient Instructions    Koffi Carranza is a 34 year old, presenting for the following health issues:  Swelling        2/29/2024     9:58 AM   Additional Questions   Roomed by Yessenia Merino CMA   Accompanied by          2/29/2024     9:58 AM   Patient Reported Additional Medications   Patient reports taking the following new medications No new medications     History of Present Illness       Reason for visit:  Hand and feet swelling    She eats 2-3 servings of fruits and vegetables daily.She consumes 1 sweetened beverage(s) daily.She exercises with enough effort to increase her heart rate 9 or less minutes per day.  She exercises with enough effort to increase her heart rate 3 or less days per week.   She is taking medications regularly.     Patient presents for evaluation of left forearm swelling and bilateral leg swelling. She gave birth just four days ago at [facility]. She had an IV infusion of pitocin infiltrate in her left forearm while she was in the hospital. After this occurred, the swelling in her forearm was outlined and she was told to elevate her arm as much as possible. An ultrasound of the extremity was completed, which showed superficial thrombophlebitis of the cephalic vein, occlusive thrombus in the distal forearm branch of the cephalic vein, and nonocclusive thrombus of the mid upper arm, but no evidence of DVT. She states that the swelling has slightly gone down in her fingers and hand but " "seems the same or worse near her wrist. The redness over her dorsal hand has remained unchanged, and she has not noticed any other skin changes since leaving the hospital. She denies any numbness, tingling, limited ROM, or weakness. She has mild pain with palpation of the swollen areas. She has been trying to keep her arm elevated and has been applying warm compresses at home.     The swelling in her bilateral legs has been worsening since she returned home from the hospital, and the swelling is slightly worse in the right leg. She denies any pain in her legs and has not noticed any skin changes in the affected areas. She has been trying to elevate her legs while at home. She denies any chest pain, shortness of breath, or history of DVTs.     Review of Systems  Constitutional, HEENT, cardiovascular, pulmonary, gi and gu systems are negative, except as otherwise noted.      Objective    /72   Pulse 67   Temp 97.5  F (36.4  C) (Temporal)   Resp 16   Ht 1.547 m (5' 0.91\")   Wt 79.3 kg (174 lb 12.8 oz)   LMP 06/12/2023   SpO2 100%   Breastfeeding No   BMI 33.13 kg/m    Body mass index is 33.13 kg/m .  Physical Exam   GENERAL: alert and no distress  RESP: lungs clear to auscultation - no rales, rhonchi or wheezes  CV: regular rate and rhythm, normal S1 S2, no S3 or S4, no murmur, click or rub, no peripheral edema  MS: no gross musculoskeletal defects noted, moderate edema with mild tenderness in left hand, wrist, and forearm, +1 edema in left leg/foot/ankle, +2 edema in right leg/foot/ankle, no tenderness or masses in bilateral legs, ROM intact  SKIN: edema as noted above in left upper extremity and bilateral legs, mild erythema over dorsal hand/wrist, no other rashes or concerning lesions  NEURO: Normal strength and tone, no peripheral numbness or tingling, mentation intact and speech normal     Signed Electronically by: LATASHA Britton    "

## 2024-03-02 NOTE — TELEPHONE ENCOUNTER
Retail Pharmacy Prior Authorization Team   Phone: [phone]    PA Initiation    Medication: NOVOLOG FLEXPEN 100 UNIT/ML SC SOPN  Insurance Company: [employer] - Phone [phone] Fax [phone]  Pharmacy Filling the Rx: [facility] [address]  Filling Pharmacy Phone: [phone]  Filling Pharmacy Fax:    Start Date: 3/2/2024    Patient has delivered her baby, no longer need a PA for medication.

## 2024-03-05 ENCOUNTER — E-CONSULT (OUTPATIENT)
Dept: ONCOLOGY | Facility: CLINIC | Age: 34
End: 2024-03-05
Payer: COMMERCIAL

## 2024-03-05 ENCOUNTER — ANCILLARY PROCEDURE (OUTPATIENT)
Dept: ULTRASOUND IMAGING | Facility: CLINIC | Age: 34
End: 2024-03-05
Attending: PHYSICIAN ASSISTANT
Payer: COMMERCIAL

## 2024-03-05 ENCOUNTER — TELEPHONE (OUTPATIENT)
Dept: FAMILY MEDICINE | Facility: CLINIC | Age: 34
End: 2024-03-05
Payer: COMMERCIAL

## 2024-03-05 DIAGNOSIS — R60.0 EDEMA OF LEFT UPPER EXTREMITY: ICD-10-CM

## 2024-03-05 DIAGNOSIS — R60.0 EDEMA OF BOTH LEGS: ICD-10-CM

## 2024-03-05 PROCEDURE — 99451 NTRPROF PH1/NTRNET/EHR 5/>: CPT | Performed by: INTERNAL MEDICINE

## 2024-03-05 PROCEDURE — 93971 EXTREMITY STUDY: CPT | Mod: TC | Performed by: RADIOLOGY

## 2024-03-05 PROCEDURE — 93970 EXTREMITY STUDY: CPT | Mod: TC | Performed by: RADIOLOGY

## 2024-03-05 PROCEDURE — 99207 E-CONSULT TO HEMATOLOGY (ADULT OUTPT PROVIDER TO SPECIALIST WRITTEN QUESTION & RESPONSE): CPT

## 2024-03-05 NOTE — TELEPHONE ENCOUNTER
See message from Aly Martinez regarding e consult:      Message    Jose Alejandro Martinez PA to P BE-RN [place] 3/5/2024  1:29 PM   Attached Progress Notes   Patient's E consult was reviewed by oncology and they recommend 2 weeks of anticoagulation with one of the following:      DOAC (eliquis or xarelto) for 2 weeks and pause of breast feed (still could pump but not use the milk)  Enoxaparin injections 1 mg/kg at least once per day (ideally twice a day).  Not strict on this given we are treating a superficial clot for symptoms and not a DVT.      Please call patient and see how she would like to proceed and I will order for her.     Thanks.           Attempted to call patient at home/mobile number, no answer, left message on voicemail; patient was instructed to return call to [facility] at [phone].    Kamala MARINO RN  [facility] Triage

## 2024-03-05 NOTE — RESULT ENCOUNTER NOTE
Please call patient. US of legs and left arm show now DVT and the superficial clot in the left arm is unchanged. I think it is reasonable for her to continue 3 times daily ibuprofen, warm compresses and elevation. I would like to do an e consult with hematology to discuss what else we can be doing for you and if you require blood thinning meds. Her insurance may cover this completely, but the maximum charge could be $125 depending on coverage. Please inquire whether she is fine with this charge. It is significantly cheaper and we'll get helpful info months sooner than a formal referral. To ER with worsening pain/edema, sob, chest pain or other worrisome symptoms in meantime. Thanks.

## 2024-03-05 NOTE — TELEPHONE ENCOUNTER
Spoke with patient. Relayed provider's message as written. Patient verbalized understanding and has no further questions at this time.    Patient is requesting prescription of either Eliquis or Xarelto to be sent to the pharmacy. MyChart message sent regarding instructions on breastfeeding as requested as well.    Pharmacy pended.    KULWANT MorenoN RN  [facility]

## 2024-03-05 NOTE — PROGRESS NOTES
Patient's E consult was reviewed by oncology and they recommend 2 weeks of anticoagulation with one of the following:     DOAC (eliquis or xarelto) for 2 weeks and pause of breast feed (still could pump but not use the milk)  Enoxaparin injections 1 mg/kg at least once per day (ideally twice a day).  Not strict on this given we are treating a superficial clot for symptoms and not a DVT.     Please call patient and see how she would like to proceed and I will order for her.    Thanks.

## 2024-03-05 NOTE — TELEPHONE ENCOUNTER
Spoke with patient, relayed providers message below and patient verbalized understanding. Pt stated no further questions and would like to proceed with e-consult with hematology.     Sissy Owen, RN on 3/5/2024 at 11:55 AM

## 2024-03-05 NOTE — TELEPHONE ENCOUNTER
----- Message from LATASHA Britton sent at 3/5/2024 11:31 AM CST -----  Please call patient. US of legs and left arm show now DVT and the superficial clot in the left arm is unchanged. I think it is reasonable for her to continue 3 times daily ibuprofen, warm compresses and elevation. I would like to do an e consult with hematology to discuss what else we can be doing for you and if you require blood thinning meds. Her insurance may cover this completely, but the maximum charge could be $125 depending on coverage. Please inquire whether she is fine with this charge. It is significantly cheaper and we'll get helpful info months sooner than a formal referral. To ER with worsening pain/edema, sob, chest pain or other worrisome symptoms in meantime. Thanks.

## 2024-03-05 NOTE — PROGRESS NOTES
3/5/2024     E-Consult has been accepted.    Interprofessional consultation requested by:  Jose Alejandro Martinez PA      Clinical Question/Purpose: MY CLINICAL QUESTION IS: patient has persistent LUE superficial clot for ~2 weeks from IV site that was infiltrated with pitocin during hospitalization for delivery of her child. Edema has been slow to improve. Repeat US today shows no DVT. She has been doing ibu TID, elevating and using warm compresses. She is attempting to breastfeed. Anything else we should be doing for her? Does she need anticoagulation? Continued monitoring?    Patient assessment and information reviewed: 33 YO woman with superficial thrombophlebitis refractory to NSAIDS and supportive care.    Recommendations: I would recommend a few weeks of anticoagulation to improve symptoms.  The challenge is that if she continues to breastfeed she can't take a DOAC.  Here are her options:  DOAC for 2 weeks and pause of breast feed (still could pump but not use the milk)  Enoxaparin injections 1 mg/kg at least once per day (ideally twice a day).  Not strict on this given we are treating a superficial clot for symptoms and not a DVT.       The recommendations provided in this E-Consult are based on a review of clinical data pertinent to the clinical question presented, without a review of the patient's complete medical record or, the benefit of a comprehensive in-person or virtual patient evaluation. This consultation should not replace the clinical judgement and evaluation of the provider ordering this E-Consult. Any new clinical issues, or changes in patient status since the filing of this E-Consult will need to be taken into account when assessing these recommendations. Please contact me if you have further questions.    My total time spent reviewing clinical information and formulating assessment was 5 minutes.        Alpesh Jiang MD

## 2024-03-06 NOTE — TELEPHONE ENCOUNTER
Spoke with patient. She will pump and dump breast milk for 2 weeks while on Xarelto for superficial clot. All questions and concerns addressed.    LATASHA Britton

## 2024-07-13 ENCOUNTER — HEALTH MAINTENANCE LETTER (OUTPATIENT)
Age: 34
End: 2024-07-13

## 2024-11-11 NOTE — PATIENT INSTRUCTIONS
If you have any questions regarding your visit, Please contact your care team.     [facility]: [phone]    To Schedule an Appointment 24/7  Call: [phone]     Gómez Richardson MD  Medical Director        Sharona-BRENT Brooks-RN  Marybel Dietz-Surgery Scheduler  Bonny-Surgery Scheduler               Tuesday-[place]  7:30 a.m-4:30 p.m    Thursday-[place]  7:30 a.m-4:30 p.m    Typical Surgery Days: Tuesday or Friday [facility]  [address] [place] [address]  PH: [phone]    Imaging Scheduling all locations  PH: [phone]     [facility]  [address] [place] [address] [place] [address]  PH: [phone]    [facility]  [address] [place] [address]  PH: [phone] Fax      **Surgeries** Our Surgery Schedulers will contact you to schedule. If you do not receive a call within 3 business days, please call [phone].    Urgent Care locations:  [facility] Monday-Friday  10 am - 8 pm  Saturday and Sunday   9 am - 5 pm  Monday-Friday   10 am - 8 pm  Saturday and Sunday   9 am - 5 pm   [phone]   If you need a medication refill, please contact your pharmacy. Please allow 3 business days for your refill to be completed.  As always, Thank you for trusting us with your healthcare needs!    see additional instructions from your care team below

## 2024-11-14 ENCOUNTER — OFFICE VISIT (OUTPATIENT)
Dept: OBGYN | Facility: CLINIC | Age: 34
End: 2024-11-14
Payer: COMMERCIAL

## 2024-11-14 VITALS
BODY MASS INDEX: 35.37 KG/M2 | HEART RATE: 100 BPM | WEIGHT: 186.6 LBS | SYSTOLIC BLOOD PRESSURE: 117 MMHG | OXYGEN SATURATION: 100 % | DIASTOLIC BLOOD PRESSURE: 81 MMHG

## 2024-11-14 DIAGNOSIS — N92.1 MENORRHAGIA WITH IRREGULAR CYCLE: Primary | ICD-10-CM

## 2024-11-14 PROCEDURE — 99214 OFFICE O/P EST MOD 30 MIN: CPT | Performed by: OBSTETRICS & GYNECOLOGY

## 2024-11-14 RX ORDER — PROGESTERONE 200 MG/1
200 CAPSULE ORAL DAILY
Qty: 7 CAPSULE | Refills: 0 | Status: SHIPPED | OUTPATIENT
Start: 2024-11-14

## 2024-11-14 RX ORDER — ESTRADIOL 1 MG/1
1 TABLET ORAL DAILY
Qty: 28 TABLET | Refills: 0 | Status: SHIPPED | OUTPATIENT
Start: 2024-11-14

## 2024-11-14 NOTE — PROGRESS NOTES
Tere is a 34 year old   here with daily bleeding since the end of September.  She delivered in . She didn't breastfeed, but didn't have a cycle until September.  Since then, the bleeding has been nearly daily and waxes and wanes, but at times very heavy.  She reports she hasn't been sexually active much at all since the baby, but not using contraception either..    ROS: Ten point review of systems was reviewed and negative except the above.    PMH: Her past medical, surgical, and obstetric histories were reviewed and are documented in their appropriate chart areas.    ALL/Meds: Her medication and allergy histories were reviewed and are documented in their appropriate chart areas.    SH/FMH: Reviewed and documented in the appropriate area.    PE: /81 (BP Location: Left arm, Patient Position: Sitting, Cuff Size: Adult Large)   Pulse 100   Wt 84.6 kg (186 lb 9.6 oz)   LMP 2024 (Approximate)   SpO2 100%   BMI 35.37 kg/m      PE: /81 (BP Location: Left arm, Patient Position: Sitting, Cuff Size: Adult Large)   Pulse 100   Wt 84.6 kg (186 lb 9.6 oz)   LMP 2024 (Approximate)   SpO2 100%   BMI 35.37 kg/m    Body mass index is 35.37 kg/m .    General Appearance:  healthy, alert, active, no distress    Abdomen: Benign, Soft, flat, non-tender, No masses, organomegaly, No inguinal nodes, and Bowel sounds normoactiveSoft, nontender.   Pelvic:       - Ext: Vulva and perineum are normal without lesion, mass or discharge        - Bladder: no tenderness, no masses       - Vagina: Normal mucosa, no discharge     without discharge, rugated, and blood in vault       - Cervix: multiparous       - Uterus:Normal shape, position and consistencyfirm, nontender, nongravid uterus without CMT       - Adnexa: Normal without masses or tenderness       - Rectal: deferredjl     Discussed that while it is normal not to cycle while breastfeeding, we would have expected her to be cycling since at  least the Spring.  Suspect she hasn't been ovulating and therefore is now having anovulatory breakthrough bleeding.  Explained how this can lead to prolonged bleeding like she has experienced.  .    A/P:   Tere presents with (N92.1) Menorrhagia with irregular cycle  (primary encounter diagnosis)  Comment: .[facility]  Plan: US Pelvic Complete with Transvaginal, estradiol        (ESTRACE) 1 MG tablet, progesterone         (PROMETRIUM) 200 MG capsule        She will do estrace for 4 weeks and pair the last week with progesterone and then expect a withdrawal bleed.  We would then wait to see if her cycles return normally, or if not, then she will follow up and we can discuss ongoing treatment options.        -    Orders Placed This Encounter   Procedures    US Pelvic Complete with Transvaginal         Gómez Richardson MD FACOG

## 2024-11-22 ENCOUNTER — ANCILLARY PROCEDURE (OUTPATIENT)
Dept: ULTRASOUND IMAGING | Facility: CLINIC | Age: 34
End: 2024-11-22
Attending: OBSTETRICS & GYNECOLOGY
Payer: COMMERCIAL

## 2024-11-22 DIAGNOSIS — N92.1 MENORRHAGIA WITH IRREGULAR CYCLE: ICD-10-CM

## 2024-11-22 PROCEDURE — 76856 US EXAM PELVIC COMPLETE: CPT | Mod: TC | Performed by: RADIOLOGY

## 2024-11-22 PROCEDURE — 76830 TRANSVAGINAL US NON-OB: CPT | Mod: TC | Performed by: RADIOLOGY

## 2025-07-19 ENCOUNTER — HEALTH MAINTENANCE LETTER (OUTPATIENT)
Age: 35
End: 2025-07-19

## (undated) DEVICE — LINEN TOWEL PACK X5 5464

## (undated) DEVICE — ESU ELEC BIPOLAR SPRING TIP VERSAPOINT 5FR WA47783A

## (undated) DEVICE — TUBING IRRIG TUR Y TYPE 96" LF 6543-01

## (undated) DEVICE — GLOVE PROTEXIS MICRO 7.5  2D73PM75

## (undated) DEVICE — PACK TVT HYSTEROSCOPY SMA15HYFSE

## (undated) DEVICE — TUBING SUCTION 12"X1/4" N612

## (undated) DEVICE — SOL NACL 0.9% INJ 1000ML BAG 2B1324X

## (undated) DEVICE — SOL WATER IRRIG 1000ML BOTTLE 2F7114

## (undated) DEVICE — SUCTION CANISTER MEDIVAC LINER 3000ML W/LID 65651-530

## (undated) RX ORDER — PROPOFOL 10 MG/ML
INJECTION, EMULSION INTRAVENOUS
Status: DISPENSED
Start: 2021-08-24

## (undated) RX ORDER — ACETAMINOPHEN 325 MG/1
TABLET ORAL
Status: DISPENSED
Start: 2021-08-24

## (undated) RX ORDER — FENTANYL CITRATE 50 UG/ML
INJECTION, SOLUTION INTRAMUSCULAR; INTRAVENOUS
Status: DISPENSED
Start: 2021-08-24

## (undated) RX ORDER — LIDOCAINE HYDROCHLORIDE 20 MG/ML
INJECTION, SOLUTION EPIDURAL; INFILTRATION; INTRACAUDAL; PERINEURAL
Status: DISPENSED
Start: 2021-08-24